# Patient Record
Sex: MALE | Race: WHITE | Employment: OTHER | URBAN - NONMETROPOLITAN AREA
[De-identification: names, ages, dates, MRNs, and addresses within clinical notes are randomized per-mention and may not be internally consistent; named-entity substitution may affect disease eponyms.]

---

## 2017-01-04 ENCOUNTER — ANTI-COAG VISIT (OUTPATIENT)
Dept: OTHER | Age: 77
End: 2017-01-04

## 2017-01-04 VITALS
HEART RATE: 96 BPM | WEIGHT: 299.8 LBS | DIASTOLIC BLOOD PRESSURE: 89 MMHG | BODY MASS INDEX: 44.92 KG/M2 | SYSTOLIC BLOOD PRESSURE: 143 MMHG

## 2017-01-04 DIAGNOSIS — I48.19 PERSISTENT ATRIAL FIBRILLATION (HCC): ICD-10-CM

## 2017-01-04 DIAGNOSIS — I26.99 OTHER PULMONARY EMBOLISM WITHOUT ACUTE COR PULMONALE, UNSPECIFIED CHRONICITY (HCC): ICD-10-CM

## 2017-01-04 LAB — POC INR: 2.2 (ref 0.8–1.2)

## 2017-01-04 PROCEDURE — 99999 PR OFFICE/OUTPT VISIT,PROCEDURE ONLY: CPT | Performed by: NURSE PRACTITIONER

## 2017-01-04 PROCEDURE — 85610 PROTHROMBIN TIME: CPT | Performed by: NURSE PRACTITIONER

## 2017-01-04 ASSESSMENT — ENCOUNTER SYMPTOMS
SHORTNESS OF BREATH: 0
DIARRHEA: 0
BLOOD IN STOOL: 0
CONSTIPATION: 0

## 2017-01-25 ENCOUNTER — ANTI-COAG VISIT (OUTPATIENT)
Dept: OTHER | Age: 77
End: 2017-01-25

## 2017-01-25 VITALS
BODY MASS INDEX: 44.5 KG/M2 | WEIGHT: 297 LBS | HEART RATE: 76 BPM | DIASTOLIC BLOOD PRESSURE: 92 MMHG | SYSTOLIC BLOOD PRESSURE: 142 MMHG

## 2017-01-25 DIAGNOSIS — I26.99 OTHER PULMONARY EMBOLISM WITHOUT ACUTE COR PULMONALE, UNSPECIFIED CHRONICITY (HCC): ICD-10-CM

## 2017-01-25 DIAGNOSIS — I48.19 PERSISTENT ATRIAL FIBRILLATION (HCC): ICD-10-CM

## 2017-01-25 LAB — POC INR: 2.3 (ref 0.8–1.2)

## 2017-01-25 PROCEDURE — G8427 DOCREV CUR MEDS BY ELIG CLIN: HCPCS | Performed by: NURSE PRACTITIONER

## 2017-01-25 PROCEDURE — 4040F PNEUMOC VAC/ADMIN/RCVD: CPT | Performed by: NURSE PRACTITIONER

## 2017-01-25 PROCEDURE — 85610 PROTHROMBIN TIME: CPT | Performed by: NURSE PRACTITIONER

## 2017-01-25 PROCEDURE — 3017F COLORECTAL CA SCREEN DOC REV: CPT | Performed by: NURSE PRACTITIONER

## 2017-01-25 PROCEDURE — 99999 PR OFFICE/OUTPT VISIT,PROCEDURE ONLY: CPT | Performed by: NURSE PRACTITIONER

## 2017-01-25 PROCEDURE — G8419 CALC BMI OUT NRM PARAM NOF/U: HCPCS | Performed by: NURSE PRACTITIONER

## 2017-01-25 ASSESSMENT — ENCOUNTER SYMPTOMS
BLOOD IN STOOL: 0
SHORTNESS OF BREATH: 0
DIARRHEA: 0
CONSTIPATION: 0

## 2017-02-09 RX ORDER — FLUTICASONE PROPIONATE 50 MCG
SPRAY, SUSPENSION (ML) NASAL
Qty: 48 G | Refills: 1 | Status: SHIPPED | OUTPATIENT
Start: 2017-02-09 | End: 2017-08-08 | Stop reason: SDUPTHER

## 2017-02-09 RX ORDER — RAMIPRIL 10 MG/1
CAPSULE ORAL
Qty: 90 CAPSULE | Refills: 1 | Status: SHIPPED | OUTPATIENT
Start: 2017-02-09 | End: 2017-06-27 | Stop reason: SDUPTHER

## 2017-02-09 RX ORDER — EZETIMIBE/SIMVASTATIN 10 MG-40MG
TABLET ORAL
Qty: 90 TABLET | Refills: 1 | Status: SHIPPED | OUTPATIENT
Start: 2017-02-09 | End: 2017-08-08 | Stop reason: SDUPTHER

## 2017-02-28 ENCOUNTER — ANTI-COAG VISIT (OUTPATIENT)
Dept: OTHER | Age: 77
End: 2017-02-28

## 2017-02-28 VITALS
DIASTOLIC BLOOD PRESSURE: 72 MMHG | SYSTOLIC BLOOD PRESSURE: 138 MMHG | HEART RATE: 76 BPM | WEIGHT: 303.8 LBS | BODY MASS INDEX: 45.52 KG/M2

## 2017-02-28 DIAGNOSIS — E78.5 HYPERLIPIDEMIA, UNSPECIFIED HYPERLIPIDEMIA TYPE: ICD-10-CM

## 2017-02-28 DIAGNOSIS — I48.19 PERSISTENT ATRIAL FIBRILLATION (HCC): ICD-10-CM

## 2017-02-28 DIAGNOSIS — E11.9 TYPE 2 DIABETES MELLITUS WITHOUT COMPLICATION, WITHOUT LONG-TERM CURRENT USE OF INSULIN (HCC): ICD-10-CM

## 2017-02-28 DIAGNOSIS — I10 ESSENTIAL HYPERTENSION: ICD-10-CM

## 2017-02-28 DIAGNOSIS — I26.99 OTHER PULMONARY EMBOLISM WITHOUT ACUTE COR PULMONALE, UNSPECIFIED CHRONICITY (HCC): ICD-10-CM

## 2017-02-28 LAB
ALT SERPL-CCNC: 14 U/L (ref 11–66)
ANION GAP SERPL CALCULATED.3IONS-SCNC: 15 MEQ/L (ref 8–16)
AVERAGE GLUCOSE: 135 MG/DL (ref 70–126)
BUN BLDV-MCNC: 30 MG/DL (ref 7–22)
CALCIUM SERPL-MCNC: 9 MG/DL (ref 8.5–10.5)
CHLORIDE BLD-SCNC: 107 MEQ/L (ref 98–111)
CHOLESTEROL, TOTAL: 114 MG/DL (ref 100–199)
CO2: 23 MEQ/L (ref 23–33)
CREAT SERPL-MCNC: 0.9 MG/DL (ref 0.4–1.2)
CREATININE, URINE: 112.5 MG/DL
GFR SERPL CREATININE-BSD FRML MDRD: 82 ML/MIN/1.73M2
GLUCOSE BLD-MCNC: 151 MG/DL (ref 70–108)
HBA1C MFR BLD: 6.5 % (ref 4.4–6.4)
HDLC SERPL-MCNC: 52 MG/DL
LDL CHOLESTEROL CALCULATED: 40 MG/DL
MICROALBUMIN UR-MCNC: 2.21 MG/DL
MICROALBUMIN/CREAT UR-RTO: 20 MG/G (ref 0–30)
POC INR: 2.3 (ref 0.8–1.2)
POTASSIUM SERPL-SCNC: 4.1 MEQ/L (ref 3.5–5.2)
SODIUM BLD-SCNC: 145 MEQ/L (ref 135–145)
TRIGL SERPL-MCNC: 112 MG/DL (ref 0–199)

## 2017-02-28 PROCEDURE — 85610 PROTHROMBIN TIME: CPT | Performed by: NURSE PRACTITIONER

## 2017-02-28 PROCEDURE — G8417 CALC BMI ABV UP PARAM F/U: HCPCS | Performed by: NURSE PRACTITIONER

## 2017-02-28 PROCEDURE — 1036F TOBACCO NON-USER: CPT | Performed by: NURSE PRACTITIONER

## 2017-02-28 PROCEDURE — 3017F COLORECTAL CA SCREEN DOC REV: CPT | Performed by: NURSE PRACTITIONER

## 2017-02-28 PROCEDURE — 4040F PNEUMOC VAC/ADMIN/RCVD: CPT | Performed by: NURSE PRACTITIONER

## 2017-02-28 PROCEDURE — G8484 FLU IMMUNIZE NO ADMIN: HCPCS | Performed by: NURSE PRACTITIONER

## 2017-02-28 PROCEDURE — 1123F ACP DISCUSS/DSCN MKR DOCD: CPT | Performed by: NURSE PRACTITIONER

## 2017-02-28 PROCEDURE — 36415 COLL VENOUS BLD VENIPUNCTURE: CPT | Performed by: INTERNAL MEDICINE

## 2017-02-28 PROCEDURE — G8427 DOCREV CUR MEDS BY ELIG CLIN: HCPCS | Performed by: NURSE PRACTITIONER

## 2017-02-28 PROCEDURE — 99212 OFFICE O/P EST SF 10 MIN: CPT | Performed by: NURSE PRACTITIONER

## 2017-02-28 ASSESSMENT — ENCOUNTER SYMPTOMS
SHORTNESS OF BREATH: 0
DIARRHEA: 0
BLOOD IN STOOL: 0
CONSTIPATION: 0

## 2017-03-06 RX ORDER — BUMETANIDE 1 MG/1
TABLET ORAL
Qty: 90 TABLET | Refills: 1 | Status: SHIPPED | OUTPATIENT
Start: 2017-03-06 | End: 2017-09-02 | Stop reason: SDUPTHER

## 2017-03-21 ENCOUNTER — OFFICE VISIT (OUTPATIENT)
Dept: INTERNAL MEDICINE | Age: 77
End: 2017-03-21

## 2017-03-21 VITALS
OXYGEN SATURATION: 97 % | HEIGHT: 68 IN | BODY MASS INDEX: 44.79 KG/M2 | HEART RATE: 70 BPM | SYSTOLIC BLOOD PRESSURE: 128 MMHG | DIASTOLIC BLOOD PRESSURE: 82 MMHG | WEIGHT: 295.5 LBS

## 2017-03-21 DIAGNOSIS — I48.19 PERSISTENT ATRIAL FIBRILLATION (HCC): ICD-10-CM

## 2017-03-21 DIAGNOSIS — R60.0 BILATERAL EDEMA OF LOWER EXTREMITY: ICD-10-CM

## 2017-03-21 DIAGNOSIS — E66.01 MORBID OBESITY WITH BMI OF 40.0-44.9, ADULT (HCC): ICD-10-CM

## 2017-03-21 DIAGNOSIS — I10 ESSENTIAL HYPERTENSION: Primary | ICD-10-CM

## 2017-03-21 DIAGNOSIS — E66.01 MORBID OBESITY DUE TO EXCESS CALORIES (HCC): ICD-10-CM

## 2017-03-21 DIAGNOSIS — E11.9 TYPE 2 DIABETES MELLITUS WITHOUT COMPLICATION, WITHOUT LONG-TERM CURRENT USE OF INSULIN (HCC): ICD-10-CM

## 2017-03-21 DIAGNOSIS — Z79.01 ANTICOAGULATED ON COUMADIN: ICD-10-CM

## 2017-03-21 DIAGNOSIS — Z23 NEED FOR VACCINATION FOR DTP: ICD-10-CM

## 2017-03-21 DIAGNOSIS — E78.00 PURE HYPERCHOLESTEROLEMIA: ICD-10-CM

## 2017-03-21 PROCEDURE — 3017F COLORECTAL CA SCREEN DOC REV: CPT | Performed by: INTERNAL MEDICINE

## 2017-03-21 PROCEDURE — 90715 TDAP VACCINE 7 YRS/> IM: CPT | Performed by: INTERNAL MEDICINE

## 2017-03-21 PROCEDURE — 1123F ACP DISCUSS/DSCN MKR DOCD: CPT | Performed by: INTERNAL MEDICINE

## 2017-03-21 PROCEDURE — G8427 DOCREV CUR MEDS BY ELIG CLIN: HCPCS | Performed by: INTERNAL MEDICINE

## 2017-03-21 PROCEDURE — 90471 IMMUNIZATION ADMIN: CPT | Performed by: INTERNAL MEDICINE

## 2017-03-21 PROCEDURE — 4040F PNEUMOC VAC/ADMIN/RCVD: CPT | Performed by: INTERNAL MEDICINE

## 2017-03-21 PROCEDURE — G8417 CALC BMI ABV UP PARAM F/U: HCPCS | Performed by: INTERNAL MEDICINE

## 2017-03-21 PROCEDURE — G8484 FLU IMMUNIZE NO ADMIN: HCPCS | Performed by: INTERNAL MEDICINE

## 2017-03-21 PROCEDURE — 1036F TOBACCO NON-USER: CPT | Performed by: INTERNAL MEDICINE

## 2017-03-21 PROCEDURE — 99214 OFFICE O/P EST MOD 30 MIN: CPT | Performed by: INTERNAL MEDICINE

## 2017-03-21 PROCEDURE — 3044F HG A1C LEVEL LT 7.0%: CPT | Performed by: INTERNAL MEDICINE

## 2017-03-21 RX ORDER — METOPROLOL TARTRATE 50 MG/1
50 TABLET, FILM COATED ORAL 2 TIMES DAILY
Qty: 180 TABLET | Refills: 1 | Status: SHIPPED | OUTPATIENT
Start: 2017-03-21 | End: 2017-09-19 | Stop reason: SDUPTHER

## 2017-03-30 DIAGNOSIS — I48.19 PERSISTENT ATRIAL FIBRILLATION (HCC): Primary | ICD-10-CM

## 2017-04-04 ENCOUNTER — ANTI-COAG VISIT (OUTPATIENT)
Dept: OTHER | Age: 77
End: 2017-04-04

## 2017-04-04 VITALS — SYSTOLIC BLOOD PRESSURE: 142 MMHG | HEART RATE: 94 BPM | DIASTOLIC BLOOD PRESSURE: 82 MMHG

## 2017-04-04 DIAGNOSIS — I26.99 OTHER PULMONARY EMBOLISM WITHOUT ACUTE COR PULMONALE, UNSPECIFIED CHRONICITY (HCC): ICD-10-CM

## 2017-04-04 DIAGNOSIS — I48.19 PERSISTENT ATRIAL FIBRILLATION (HCC): ICD-10-CM

## 2017-04-04 LAB — POC INR: 2.6 (ref 0.8–1.2)

## 2017-04-04 ASSESSMENT — ENCOUNTER SYMPTOMS
DIARRHEA: 0
BLOOD IN STOOL: 0
CONSTIPATION: 0

## 2017-05-16 ENCOUNTER — ANTI-COAG VISIT (OUTPATIENT)
Dept: OTHER | Age: 77
End: 2017-05-16

## 2017-05-16 VITALS
WEIGHT: 299.8 LBS | BODY MASS INDEX: 45.58 KG/M2 | DIASTOLIC BLOOD PRESSURE: 72 MMHG | SYSTOLIC BLOOD PRESSURE: 139 MMHG | HEART RATE: 72 BPM

## 2017-05-16 DIAGNOSIS — I26.99 OTHER PULMONARY EMBOLISM WITHOUT ACUTE COR PULMONALE, UNSPECIFIED CHRONICITY (HCC): ICD-10-CM

## 2017-05-16 DIAGNOSIS — I48.19 PERSISTENT ATRIAL FIBRILLATION (HCC): ICD-10-CM

## 2017-05-16 LAB — POC INR: 2.7 (ref 0.8–1.2)

## 2017-05-16 ASSESSMENT — ENCOUNTER SYMPTOMS
DIARRHEA: 0
SHORTNESS OF BREATH: 0
CONSTIPATION: 0
BLOOD IN STOOL: 0

## 2017-05-27 DIAGNOSIS — N40.0 BENIGN NON-NODULAR PROSTATIC HYPERPLASIA WITHOUT LOWER URINARY TRACT SYMPTOMS: ICD-10-CM

## 2017-05-31 RX ORDER — DUTASTERIDE 0.5 MG/1
CAPSULE, LIQUID FILLED ORAL
Qty: 90 CAPSULE | Refills: 1 | Status: SHIPPED | OUTPATIENT
Start: 2017-05-31 | End: 2017-11-27 | Stop reason: SDUPTHER

## 2017-06-15 RX ORDER — PAROXETINE HYDROCHLORIDE 20 MG/1
TABLET, FILM COATED ORAL
Qty: 90 TABLET | Refills: 1 | Status: SHIPPED | OUTPATIENT
Start: 2017-06-15 | End: 2017-12-12 | Stop reason: SDUPTHER

## 2017-06-27 ENCOUNTER — OFFICE VISIT (OUTPATIENT)
Dept: INTERNAL MEDICINE | Age: 77
End: 2017-06-27

## 2017-06-27 VITALS
HEIGHT: 68 IN | HEART RATE: 76 BPM | BODY MASS INDEX: 44.41 KG/M2 | WEIGHT: 293 LBS | SYSTOLIC BLOOD PRESSURE: 118 MMHG | DIASTOLIC BLOOD PRESSURE: 78 MMHG

## 2017-06-27 DIAGNOSIS — E78.00 PURE HYPERCHOLESTEROLEMIA: ICD-10-CM

## 2017-06-27 DIAGNOSIS — E66.01 MORBID OBESITY DUE TO EXCESS CALORIES (HCC): Primary | ICD-10-CM

## 2017-06-27 DIAGNOSIS — I10 ESSENTIAL HYPERTENSION: ICD-10-CM

## 2017-06-27 DIAGNOSIS — E11.9 TYPE 2 DIABETES MELLITUS WITHOUT COMPLICATION, WITHOUT LONG-TERM CURRENT USE OF INSULIN (HCC): ICD-10-CM

## 2017-06-27 PROCEDURE — 1036F TOBACCO NON-USER: CPT | Performed by: INTERNAL MEDICINE

## 2017-06-27 PROCEDURE — 3017F COLORECTAL CA SCREEN DOC REV: CPT | Performed by: INTERNAL MEDICINE

## 2017-06-27 PROCEDURE — 3046F HEMOGLOBIN A1C LEVEL >9.0%: CPT | Performed by: INTERNAL MEDICINE

## 2017-06-27 PROCEDURE — 4040F PNEUMOC VAC/ADMIN/RCVD: CPT | Performed by: INTERNAL MEDICINE

## 2017-06-27 PROCEDURE — 1123F ACP DISCUSS/DSCN MKR DOCD: CPT | Performed by: INTERNAL MEDICINE

## 2017-06-27 PROCEDURE — G8417 CALC BMI ABV UP PARAM F/U: HCPCS | Performed by: INTERNAL MEDICINE

## 2017-06-27 PROCEDURE — G8427 DOCREV CUR MEDS BY ELIG CLIN: HCPCS | Performed by: INTERNAL MEDICINE

## 2017-06-27 PROCEDURE — 99213 OFFICE O/P EST LOW 20 MIN: CPT | Performed by: INTERNAL MEDICINE

## 2017-06-27 RX ORDER — RAMIPRIL 10 MG/1
CAPSULE ORAL
Qty: 90 CAPSULE | Refills: 1 | Status: SHIPPED | OUTPATIENT
Start: 2017-06-27 | End: 2018-01-05 | Stop reason: SDUPTHER

## 2017-06-27 ASSESSMENT — PATIENT HEALTH QUESTIONNAIRE - PHQ9
1. LITTLE INTEREST OR PLEASURE IN DOING THINGS: 0
2. FEELING DOWN, DEPRESSED OR HOPELESS: 0
SUM OF ALL RESPONSES TO PHQ9 QUESTIONS 1 & 2: 0
SUM OF ALL RESPONSES TO PHQ QUESTIONS 1-9: 0

## 2017-06-29 ENCOUNTER — ANTI-COAG VISIT (OUTPATIENT)
Dept: OTHER | Age: 77
End: 2017-06-29

## 2017-06-29 VITALS
HEART RATE: 78 BPM | DIASTOLIC BLOOD PRESSURE: 69 MMHG | WEIGHT: 296 LBS | SYSTOLIC BLOOD PRESSURE: 111 MMHG | BODY MASS INDEX: 44.86 KG/M2

## 2017-06-29 DIAGNOSIS — I48.19 PERSISTENT ATRIAL FIBRILLATION (HCC): ICD-10-CM

## 2017-06-29 DIAGNOSIS — I26.99 OTHER PULMONARY EMBOLISM WITHOUT ACUTE COR PULMONALE, UNSPECIFIED CHRONICITY (HCC): ICD-10-CM

## 2017-06-29 LAB — POC INR: 3.4 (ref 0.8–1.2)

## 2017-06-29 ASSESSMENT — ENCOUNTER SYMPTOMS
DIARRHEA: 0
SHORTNESS OF BREATH: 0
BLOOD IN STOOL: 0
CONSTIPATION: 0

## 2017-07-20 ENCOUNTER — HOSPITAL ENCOUNTER (OUTPATIENT)
Dept: PHARMACY | Age: 77
Setting detail: THERAPIES SERIES
Discharge: HOME | End: 2017-07-20
Payer: MEDICARE

## 2017-07-20 VITALS
WEIGHT: 293.6 LBS | BODY MASS INDEX: 44.5 KG/M2 | DIASTOLIC BLOOD PRESSURE: 69 MMHG | SYSTOLIC BLOOD PRESSURE: 133 MMHG | HEART RATE: 62 BPM

## 2017-07-20 DIAGNOSIS — I48.19 PERSISTENT ATRIAL FIBRILLATION (HCC): ICD-10-CM

## 2017-07-20 DIAGNOSIS — I26.99 OTHER PULMONARY EMBOLISM WITHOUT ACUTE COR PULMONALE, UNSPECIFIED CHRONICITY (HCC): ICD-10-CM

## 2017-07-20 LAB — POC INR: 2.5 (ref 0.8–1.2)

## 2017-07-20 PROCEDURE — 99212 OFFICE O/P EST SF 10 MIN: CPT

## 2017-07-20 PROCEDURE — 36416 COLLJ CAPILLARY BLOOD SPEC: CPT

## 2017-07-20 PROCEDURE — 85610 PROTHROMBIN TIME: CPT

## 2017-07-20 RX ORDER — WARFARIN SODIUM 5 MG/1
TABLET ORAL
Qty: 130 TABLET | Refills: 3 | Status: SHIPPED | OUTPATIENT
Start: 2017-07-20 | End: 2019-06-19 | Stop reason: SDUPTHER

## 2017-07-20 RX ORDER — WARFARIN SODIUM 5 MG/1
TABLET ORAL EVERY EVENING
COMMUNITY
End: 2017-09-05

## 2017-07-20 ASSESSMENT — ENCOUNTER SYMPTOMS
CONSTIPATION: 0
BLOOD IN STOOL: 0
SHORTNESS OF BREATH: 0
DIARRHEA: 0

## 2017-08-08 RX ORDER — FLUTICASONE PROPIONATE 50 MCG
SPRAY, SUSPENSION (ML) NASAL
Qty: 48 G | Refills: 1 | Status: SHIPPED | OUTPATIENT
Start: 2017-08-08 | End: 2018-06-28 | Stop reason: SDUPTHER

## 2017-08-08 RX ORDER — EZETIMIBE AND SIMVASTATIN 10; 40 MG/1; MG/1
TABLET ORAL
Qty: 90 TABLET | Refills: 1 | Status: SHIPPED | OUTPATIENT
Start: 2017-08-08 | End: 2018-02-04 | Stop reason: SDUPTHER

## 2017-08-14 ENCOUNTER — TELEPHONE (OUTPATIENT)
Dept: INTERNAL MEDICINE CLINIC | Age: 77
End: 2017-08-14

## 2017-08-14 ENCOUNTER — TELEPHONE (OUTPATIENT)
Dept: PHARMACY | Age: 77
End: 2017-08-14

## 2017-08-17 ENCOUNTER — TELEPHONE (OUTPATIENT)
Dept: PHARMACY | Age: 77
End: 2017-08-17

## 2017-08-22 ENCOUNTER — TELEPHONE (OUTPATIENT)
Dept: PHARMACY | Age: 77
End: 2017-08-22

## 2017-09-05 ENCOUNTER — HOSPITAL ENCOUNTER (OUTPATIENT)
Dept: PHARMACY | Age: 77
Setting detail: THERAPIES SERIES
Discharge: HOME | End: 2017-09-05
Payer: MEDICARE

## 2017-09-05 VITALS
BODY MASS INDEX: 43.95 KG/M2 | DIASTOLIC BLOOD PRESSURE: 79 MMHG | WEIGHT: 290 LBS | HEART RATE: 78 BPM | SYSTOLIC BLOOD PRESSURE: 139 MMHG

## 2017-09-05 DIAGNOSIS — I48.19 PERSISTENT ATRIAL FIBRILLATION (HCC): ICD-10-CM

## 2017-09-05 DIAGNOSIS — I26.99 OTHER PULMONARY EMBOLISM WITHOUT ACUTE COR PULMONALE, UNSPECIFIED CHRONICITY (HCC): ICD-10-CM

## 2017-09-05 LAB
INR BLD: 1.7
POC INR: 1.7 (ref 0.8–1.2)

## 2017-09-05 PROCEDURE — 85610 PROTHROMBIN TIME: CPT

## 2017-09-05 PROCEDURE — 99211 OFF/OP EST MAY X REQ PHY/QHP: CPT

## 2017-09-05 PROCEDURE — 36416 COLLJ CAPILLARY BLOOD SPEC: CPT

## 2017-09-05 RX ORDER — BUMETANIDE 1 MG/1
TABLET ORAL
Qty: 90 TABLET | Refills: 1 | Status: SHIPPED | OUTPATIENT
Start: 2017-09-05 | End: 2018-03-04 | Stop reason: SDUPTHER

## 2017-09-05 ASSESSMENT — ENCOUNTER SYMPTOMS
DIARRHEA: 0
SHORTNESS OF BREATH: 0
CONSTIPATION: 0
BLOOD IN STOOL: 0

## 2017-09-19 ENCOUNTER — HOSPITAL ENCOUNTER (OUTPATIENT)
Dept: PHARMACY | Age: 77
Setting detail: THERAPIES SERIES
Discharge: HOME | End: 2017-09-19
Payer: MEDICARE

## 2017-09-19 VITALS
SYSTOLIC BLOOD PRESSURE: 139 MMHG | BODY MASS INDEX: 43.41 KG/M2 | WEIGHT: 286.4 LBS | HEART RATE: 80 BPM | DIASTOLIC BLOOD PRESSURE: 77 MMHG

## 2017-09-19 DIAGNOSIS — I48.19 PERSISTENT ATRIAL FIBRILLATION (HCC): ICD-10-CM

## 2017-09-19 DIAGNOSIS — E11.9 TYPE 2 DIABETES MELLITUS WITHOUT COMPLICATION, WITHOUT LONG-TERM CURRENT USE OF INSULIN (HCC): ICD-10-CM

## 2017-09-19 DIAGNOSIS — I26.99 OTHER PULMONARY EMBOLISM WITHOUT ACUTE COR PULMONALE, UNSPECIFIED CHRONICITY (HCC): ICD-10-CM

## 2017-09-19 DIAGNOSIS — I10 ESSENTIAL HYPERTENSION: ICD-10-CM

## 2017-09-19 LAB — POC INR: 2.7 (ref 0.8–1.2)

## 2017-09-19 PROCEDURE — 99211 OFF/OP EST MAY X REQ PHY/QHP: CPT

## 2017-09-19 PROCEDURE — 85610 PROTHROMBIN TIME: CPT

## 2017-09-19 PROCEDURE — 36416 COLLJ CAPILLARY BLOOD SPEC: CPT

## 2017-09-19 RX ORDER — METOPROLOL TARTRATE 50 MG/1
TABLET, FILM COATED ORAL
Qty: 180 TABLET | Refills: 1 | Status: SHIPPED | OUTPATIENT
Start: 2017-09-19 | End: 2018-02-20

## 2017-09-19 ASSESSMENT — ENCOUNTER SYMPTOMS
SHORTNESS OF BREATH: 0
CONSTIPATION: 0
DIARRHEA: 0
BLOOD IN STOOL: 0

## 2017-10-12 ENCOUNTER — HOSPITAL ENCOUNTER (OUTPATIENT)
Dept: PHARMACY | Age: 77
Setting detail: THERAPIES SERIES
Discharge: HOME | End: 2017-10-12
Payer: MEDICARE

## 2017-10-12 VITALS
DIASTOLIC BLOOD PRESSURE: 72 MMHG | HEART RATE: 73 BPM | BODY MASS INDEX: 44.22 KG/M2 | SYSTOLIC BLOOD PRESSURE: 132 MMHG | WEIGHT: 291.8 LBS

## 2017-10-12 DIAGNOSIS — I26.99 OTHER PULMONARY EMBOLISM WITHOUT ACUTE COR PULMONALE, UNSPECIFIED CHRONICITY (HCC): ICD-10-CM

## 2017-10-12 DIAGNOSIS — I48.19 PERSISTENT ATRIAL FIBRILLATION (HCC): ICD-10-CM

## 2017-10-12 LAB — POC INR: 2.9 (ref 0.8–1.2)

## 2017-10-12 PROCEDURE — 99211 OFF/OP EST MAY X REQ PHY/QHP: CPT

## 2017-10-12 PROCEDURE — 85610 PROTHROMBIN TIME: CPT

## 2017-10-12 PROCEDURE — 36416 COLLJ CAPILLARY BLOOD SPEC: CPT

## 2017-10-12 ASSESSMENT — ENCOUNTER SYMPTOMS
CONSTIPATION: 0
BLOOD IN STOOL: 0
SHORTNESS OF BREATH: 0
DIARRHEA: 0

## 2017-10-12 NOTE — PROGRESS NOTES
The Medication Management [facility]  Anticoagulation Clinic  [phone] (phone)           [phone] (fax)    Visit Date: 10/12/2017     Subjective:       Patient ID: [place], 78 y.o., male    HPI  Patient seen in clinic for anticoagulation management for diagnoses of persistent atrial fib, PE with a goal INR of 2.0-3.0. Last seen 3 weeks ago. States correct coumadin dose and tablet strength. Denies missed doses. Review of Systems   Constitutional: Negative for activity change, appetite change and fatigue. HENT: Negative for nosebleeds. Respiratory: Negative for shortness of breath. Cardiovascular: Negative for chest pain and leg swelling. Gastrointestinal: Negative for blood in stool, constipation and diarrhea. Genitourinary: Negative for hematuria. Neurological: Negative for dizziness, weakness, light-headedness and headaches. Hematological: Does not bruise/bleed easily.        Objective:   Physical Exam   Vitals:    10/12/17 1411   BP: 132/72   Pulse: 73       Physical Exam   Constitutional: He is oriented to person, place, and time. He appears well-developed and well-nourished. Cardiovascular: Normal rate. Pulmonary/Chest: Effort normal.   Neurological: He is alert and oriented to person, place, and time. Psychiatric: He has a normal mood and affect. His behavior is normal.       Assessment:     Lab Results   Component Value Date    INR 2.90 (H) 10/12/2017    INR 2.70 (H) 09/19/2017    INR 1.70 (H) 09/05/2017    PROTIME 21.7 (A) 08/14/2017    PROTIME 52.5 (H) 08/12/2017    PROTIME 26.8 (H) 05/30/2015     INR therapeutic   Recent Labs      10/12/17   1410   INR  2.90*                           Plan:   POCT INR ordered and result reviewed. Continue Coumadin 5 mg po MF, 7.5 mg po TWTHSS. Recheck INR 4 weeks. Patient reminded to call the Anticoagulation Clinic with any signs or symptoms of bleeding or with any medication changes.   Patient given instructions utilizing the teach back method. Discharged ambulatory in no apparent distress.

## 2017-10-13 ENCOUNTER — TELEPHONE (OUTPATIENT)
Dept: INTERNAL MEDICINE CLINIC | Age: 77
End: 2017-10-13

## 2017-10-13 DIAGNOSIS — E11.9 TYPE 2 DIABETES MELLITUS WITHOUT COMPLICATION, WITHOUT LONG-TERM CURRENT USE OF INSULIN (HCC): ICD-10-CM

## 2017-10-13 DIAGNOSIS — E78.00 PURE HYPERCHOLESTEROLEMIA: ICD-10-CM

## 2017-10-13 LAB
ALT SERPL-CCNC: 12 U/L (ref 11–66)
ANION GAP SERPL CALCULATED.3IONS-SCNC: 16 MEQ/L (ref 8–16)
AVERAGE GLUCOSE: 132 MG/DL (ref 70–126)
BUN BLDV-MCNC: 24 MG/DL (ref 7–22)
CALCIUM SERPL-MCNC: 8.7 MG/DL (ref 8.5–10.5)
CHLORIDE BLD-SCNC: 104 MEQ/L (ref 98–111)
CO2: 26 MEQ/L (ref 23–33)
CREAT SERPL-MCNC: 0.8 MG/DL (ref 0.4–1.2)
GFR SERPL CREATININE-BSD FRML MDRD: > 90 ML/MIN/1.73M2
GLUCOSE BLD-MCNC: 159 MG/DL (ref 70–108)
HBA1C MFR BLD: 6.4 % (ref 4.4–6.4)
POTASSIUM SERPL-SCNC: 4.1 MEQ/L (ref 3.5–5.2)
SODIUM BLD-SCNC: 146 MEQ/L (ref 135–145)

## 2017-10-13 NOTE — TELEPHONE ENCOUNTER
Informed of lab results and that Dr. Adarsh Turner states he looks \"dry\" and needs to drink more water and to limit his caffeine.

## 2017-10-13 NOTE — TELEPHONE ENCOUNTER
----- Message from Selina Jurado MD sent at 10/13/2017 11:20 AM EDT -----  Let him know that labs look dry - He needs to drink more water and limit caffeine.

## 2017-10-13 NOTE — PROGRESS NOTES
Venipuncture obtained from right arm. Patient tolerated the procedure without complications or complaints.

## 2017-10-31 ENCOUNTER — OFFICE VISIT (OUTPATIENT)
Dept: INTERNAL MEDICINE CLINIC | Age: 77
End: 2017-10-31
Payer: MEDICARE

## 2017-10-31 VITALS
BODY MASS INDEX: 44.56 KG/M2 | DIASTOLIC BLOOD PRESSURE: 78 MMHG | WEIGHT: 294 LBS | SYSTOLIC BLOOD PRESSURE: 132 MMHG | HEIGHT: 68 IN | HEART RATE: 70 BPM

## 2017-10-31 DIAGNOSIS — Z23 NEED FOR IMMUNIZATION AGAINST INFLUENZA: ICD-10-CM

## 2017-10-31 DIAGNOSIS — E78.00 PURE HYPERCHOLESTEROLEMIA: ICD-10-CM

## 2017-10-31 DIAGNOSIS — Z23 NEED FOR PROPHYLACTIC VACCINATION AGAINST STREPTOCOCCUS PNEUMONIAE (PNEUMOCOCCUS): ICD-10-CM

## 2017-10-31 DIAGNOSIS — Z79.01 ANTICOAGULATED ON COUMADIN: ICD-10-CM

## 2017-10-31 DIAGNOSIS — E66.01 MORBID OBESITY (HCC): ICD-10-CM

## 2017-10-31 DIAGNOSIS — I48.19 PERSISTENT ATRIAL FIBRILLATION (HCC): ICD-10-CM

## 2017-10-31 DIAGNOSIS — I10 ESSENTIAL HYPERTENSION: Primary | ICD-10-CM

## 2017-10-31 DIAGNOSIS — E87.0 HYPERNATREMIA: ICD-10-CM

## 2017-10-31 DIAGNOSIS — E11.9 TYPE 2 DIABETES MELLITUS WITHOUT COMPLICATION, WITHOUT LONG-TERM CURRENT USE OF INSULIN (HCC): ICD-10-CM

## 2017-10-31 DIAGNOSIS — F33.41 MAJOR DEPRESSIVE DISORDER, RECURRENT, IN PARTIAL REMISSION (HCC): ICD-10-CM

## 2017-10-31 PROCEDURE — G8484 FLU IMMUNIZE NO ADMIN: HCPCS | Performed by: INTERNAL MEDICINE

## 2017-10-31 PROCEDURE — 4040F PNEUMOC VAC/ADMIN/RCVD: CPT | Performed by: INTERNAL MEDICINE

## 2017-10-31 PROCEDURE — G0008 ADMIN INFLUENZA VIRUS VAC: HCPCS | Performed by: INTERNAL MEDICINE

## 2017-10-31 PROCEDURE — G8427 DOCREV CUR MEDS BY ELIG CLIN: HCPCS | Performed by: INTERNAL MEDICINE

## 2017-10-31 PROCEDURE — 90732 PPSV23 VACC 2 YRS+ SUBQ/IM: CPT | Performed by: INTERNAL MEDICINE

## 2017-10-31 PROCEDURE — 3017F COLORECTAL CA SCREEN DOC REV: CPT | Performed by: INTERNAL MEDICINE

## 2017-10-31 PROCEDURE — 1123F ACP DISCUSS/DSCN MKR DOCD: CPT | Performed by: INTERNAL MEDICINE

## 2017-10-31 PROCEDURE — G8417 CALC BMI ABV UP PARAM F/U: HCPCS | Performed by: INTERNAL MEDICINE

## 2017-10-31 PROCEDURE — 1036F TOBACCO NON-USER: CPT | Performed by: INTERNAL MEDICINE

## 2017-10-31 PROCEDURE — 90662 IIV NO PRSV INCREASED AG IM: CPT | Performed by: INTERNAL MEDICINE

## 2017-10-31 PROCEDURE — 93000 ELECTROCARDIOGRAM COMPLETE: CPT | Performed by: INTERNAL MEDICINE

## 2017-10-31 PROCEDURE — G0009 ADMIN PNEUMOCOCCAL VACCINE: HCPCS | Performed by: INTERNAL MEDICINE

## 2017-10-31 PROCEDURE — 3044F HG A1C LEVEL LT 7.0%: CPT | Performed by: INTERNAL MEDICINE

## 2017-10-31 PROCEDURE — 99214 OFFICE O/P EST MOD 30 MIN: CPT | Performed by: INTERNAL MEDICINE

## 2017-10-31 NOTE — PROGRESS NOTES
After obtaining consent, and per orders of Dr. Janay Berrios, injection of Fluzone HD given in Right deltoid by [facility]. Patient instructed to remain in clinic for 20 minutes afterwards, and to report any adverse reaction to me immediately.

## 2017-10-31 NOTE — PROGRESS NOTES
Chief Complaint   Patient presents with    Diabetes    Hyperlipidemia    Hypertension    Obesity       This patient presents for medical evaluation. I last saw the patient 4 months ago. This patient is followed by Dr. Stephanie Allen. He had recent issues with epistaxis - 2 major bleeds in 5 days - went to ER - [facility] ENT cauderize right nare and is doing well. He stopped Coumadin and Aspirin x 2 weeks. Repeat INR was 1.7 and now 2.9. Morbid obesity - BMI 44.66 -> 43.15->44.93 ->44.41 -> 44.56. He is watching what he eats - he states he doesn't want to be on more medication for diabetes. Today she states he is being good with keeping sweets in moderation. Again encouraged him to be more active - walk around the store with wife instead of sitting while she shops. Joint pain is still a major issue. Offered to send to ortho for steroid injections but he refuses. Will try to lose weight - work on portion size. Wife states that now that she can't see - he is trying to get more junk food in grocery. But she is trying to encourage him to eat better. Wife lost her sight. She is getting eye injections to hopefully help. She may transfer to me - Dr. Stephanie Allen retired. He was diagnosed with DM in 2012 but just used diet control until 2/2016 when HgA1c 7.8 - started metformin 500 mg BID - main complaint is gas. Offered metformin XR but wanted to stay with current pill. Last eye exam 12/8/16 Dr. Suhail Wasserman - no diabetic retinopathy -note on chart. He denies foot lesions - exam 10/31/17. On ASA, Vytorin, and Ramipril. Denies autonomic dysfunction, neuropathy, no nephropathy 2/2017. BMP normal 10/2017. Repeat HgA1c 6.9 6/2016 after starting metformin, no GI issues on the metformin now. HgA1c 6.4 10/2017. Hypernatremia - 146 10/2017, not drinking enough water - better now with water intake.       HM - he got a reminder from [facility] to do colonoscopy (7 year followup - history of polyps and none on last one). Encouraged him to get this scheduled. Leg edema - He backed off diuretic to every 2-3 days and edema stable. Previously taking 2 days in a row at times and may skip 2 days. Now on diuretic daily except Sunday and edema is stable. Hypertension - BP normal today - On Ramipril, and Metoprolol - unable to start Norvasc due to dose of Simvastatin. No headache or visual changes. Hyperlipidemia - LDL 40 2/2017 - at goal, normal ALT 10/2017. No new muscle aches on statin. Continue Vytorin. GERD - He uses Zantac Qpm - may increase to twice a day. He states he didn't tolerate the PPI in the past.  Zantac not as effective, increased gas in am.  He wants to try Nexium OTC - suggest it was a good idea. If not helpful try Gas-x. He didn't try Gas-X or Nexium since last visit. Reminded him of these options. Today he states he didn't start the Nexium, and felling better with weight loss - only taking Zantac occasionally. BPH - He states he wants me to fill Avodart due to sees me more frequently than Dr. Kel Kohler. He had gross hematuria several months ago - Dr. Kel Kohler switched finesteride to Avodart and discussed considering IVC filter and stopping Coumadin if Avodart didn't help. We are using Coumadin for A. Fib really now, not the PE. He was diagnosed with A. Fib at time of PE, they wanted to do cardioversion at [address] [place] [address] - but he refused till he talked to me first.  He was sent to Dr. Jaky Singer for follow up. I will be managing his Coumadin with [facility] coumadin clinic. Dr. Jaky Singer decided not to do CV (due to his weight) per patient's report. He states he is no longer seeing Dr. Jaky Singer. I will continue his Metoprolol and Coumadin. Discussed Eliquis in past, but he wanted to stick with Coumadin. He is still in Atrial fibrillation with a CHADS score of 4 (hypertension, DM and history of PE) - so I would favor continuing Coumadin as long as he is no longer bleeding.   No issues since last visit with bleeding or CVA symptoms. He had issues with hematuria/passing blood clots. Seen in [facility] ER 5/30/15 - no UTI by culture but large amount of blood, INR at the time was 2.4 with a normal Hg, kidney function. Saw Dr. Clarence Sweet and work-up continuing. Cystoscopy without bladder lesion. CT done 6/18/15 with non-obstructing stones and possible bladder diverticulum, to see Dr. Clarence Sweet Tuesday to discuss. He was told hematuria was due to BPH and no hematuria since starting Proscar. He stopped Coumadin for 8 days till saw Dr. Clarence Sweet, then held a couple days. He states he has been back on Coumadin a week and no hematuria. Continues to be resolved. Saw Dr. Clarence Sweet 9/2015 with good report. 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 [facility] sent him by helicopter to [facility]. He had directed thrombolytics given. He states that the venous doppler of bilateral legs were negative. He was placed on Coumadin and sent to [address] [place] [address] - I will manage with them. INR is therapeutic. He has noticed right leg edema - stable and slowly getting better. He is sleeping in recliner. Suggested he needs to elevate above the level of his heart and wear compression hose at all times - may take off at night if legs are elevated. He states edema has improved since last visit. He was no longer wearing compression hose. Suggested getting back into it to prevent edema from coming back. OA - Recommend no Ibuprofen now that he is on Coumadin. He is using Tylenol prn instead.     Past Medical History:   Diagnosis Date    Allergic rhinitis     Atrial fibrillation ([address] 2/2014    Depression     Diabetes mellitus type II 1/2012    diagnosed with HgA1c 6.6    Erectile dysfunction     Hyperlipidemia     Hypertension     Lower extremity edema     LV dysfunction     h/o, normal now    Morbid obesity ([address]     Osteoarthritis     left knee, right foot, back    PE (pulmonary embolism) 2/2014 more.    Atrial fib - on Coumadin, CHADS 4. No bleeding or signs of CVA. HM - give flu and pneumovax 23 today. Depression - stable but will occasionally complain of depression - continue Paxil. Will schedule follow up appointment in 4 months follow-up chronic issues. Continue medications at current doses. Erick Jean Baptiste MD    [facility] PROFESSIONAL SERVS  PHYSICIANS [facility]  [address]  [address]  Dept: [phone]  Dept Fax: [phone] : [phone]

## 2017-10-31 NOTE — PROGRESS NOTES
After obtaining consent, and per orders of Dr. Sarah Babb, injection of Pneumovax 23 given in Left deltoid by [facility] . Patient instructed to remain in clinic for 20 minutes afterwards, and to report any adverse reaction to me immediately.

## 2017-11-09 ENCOUNTER — HOSPITAL ENCOUNTER (OUTPATIENT)
Dept: PHARMACY | Age: 77
Setting detail: THERAPIES SERIES
Discharge: HOME | End: 2017-11-09
Payer: MEDICARE

## 2017-11-09 VITALS
DIASTOLIC BLOOD PRESSURE: 73 MMHG | SYSTOLIC BLOOD PRESSURE: 144 MMHG | BODY MASS INDEX: 44.68 KG/M2 | WEIGHT: 294.8 LBS | HEART RATE: 101 BPM

## 2017-11-09 DIAGNOSIS — I26.99 OTHER PULMONARY EMBOLISM WITHOUT ACUTE COR PULMONALE, UNSPECIFIED CHRONICITY (HCC): ICD-10-CM

## 2017-11-09 DIAGNOSIS — I48.19 PERSISTENT ATRIAL FIBRILLATION (HCC): ICD-10-CM

## 2017-11-09 LAB — POC INR: 3.4 (ref 0.8–1.2)

## 2017-11-09 PROCEDURE — 36416 COLLJ CAPILLARY BLOOD SPEC: CPT | Performed by: PHARMACIST

## 2017-11-09 PROCEDURE — 85610 PROTHROMBIN TIME: CPT | Performed by: PHARMACIST

## 2017-11-09 PROCEDURE — 99211 OFF/OP EST MAY X REQ PHY/QHP: CPT | Performed by: PHARMACIST

## 2017-11-09 NOTE — PROGRESS NOTES
The Medication Management [facility]  Anticoagulation Clinic  [phone] (phone)           [phone] (fax)    Visit Date: 11/9/2017     Subjective:       Patient ID: Carlos Britton, 78 y.o., male    HPI  Patient seen in clinic for anticoagulation management for diagnoses of persistent atrial fib, PE with a goal INR of 2.0-3.0. Last seen 3 weeks ago. States correct coumadin dose and tablet strength. Denies missed doses. Review of Systems   Constitutional: Negative for activity change, appetite change and fatigue. HENT: Negative for nosebleeds. Respiratory: Negative for shortness of breath. Cardiovascular: Negative for chest pain and leg swelling. Gastrointestinal: Negative for blood in stool, constipation and diarrhea. Genitourinary: Negative for hematuria. Neurological: Negative for dizziness, weakness, light-headedness and headaches. Hematological: Does not bruise/bleed easily.        Objective:   Physical Exam   Vitals:    11/09/17 1415   BP: (!) 144/73   Pulse: 101       Physical Exam   Constitutional: He is oriented to person, place, and time. He appears well-developed and well-nourished. Cardiovascular: Normal rate. Pulmonary/Chest: Effort normal.   Neurological: He is alert and oriented to person, place, and time. Psychiatric: He has a normal mood and affect. His behavior is normal.       Assessment:     Lab Results   Component Value Date    INR 3.40 (H) 11/09/2017    INR 2.90 (H) 10/12/2017    INR 2.70 (H) 09/19/2017    PROTIME 21.7 (A) 08/14/2017    PROTIME 52.5 (H) 08/12/2017    PROTIME 26.8 (H) 05/30/2015     INR therapeutic   Recent Labs      11/09/17   1413   INR  3.40*                           Plan:   POCT INR ordered and result reviewed. Continue Coumadin 5 mg po MF, 7.5 mg po TWTHSS. Recheck INR 4 weeks. Patient reminded to call the Anticoagulation Clinic with any signs or symptoms of bleeding or with any medication changes.   Patient given

## 2017-11-27 DIAGNOSIS — N40.0 BENIGN NON-NODULAR PROSTATIC HYPERPLASIA WITHOUT LOWER URINARY TRACT SYMPTOMS: ICD-10-CM

## 2017-11-28 RX ORDER — DUTASTERIDE 0.5 MG/1
CAPSULE, LIQUID FILLED ORAL
Qty: 90 CAPSULE | Refills: 1 | Status: SHIPPED | OUTPATIENT
Start: 2017-11-28 | End: 2018-02-20 | Stop reason: SDUPTHER

## 2017-11-29 ENCOUNTER — HOSPITAL ENCOUNTER (OUTPATIENT)
Dept: PHARMACY | Age: 77
Setting detail: THERAPIES SERIES
Discharge: HOME | End: 2017-11-29
Payer: MEDICARE

## 2017-11-29 DIAGNOSIS — I26.99 OTHER PULMONARY EMBOLISM WITHOUT ACUTE COR PULMONALE, UNSPECIFIED CHRONICITY (HCC): ICD-10-CM

## 2017-11-29 DIAGNOSIS — I48.19 PERSISTENT ATRIAL FIBRILLATION (HCC): ICD-10-CM

## 2017-11-29 LAB — POC INR: 2.4 (ref 0.8–1.2)

## 2017-11-29 PROCEDURE — 85610 PROTHROMBIN TIME: CPT

## 2017-11-29 PROCEDURE — 36416 COLLJ CAPILLARY BLOOD SPEC: CPT

## 2017-11-29 PROCEDURE — 99211 OFF/OP EST MAY X REQ PHY/QHP: CPT

## 2017-11-29 ASSESSMENT — ENCOUNTER SYMPTOMS
CONSTIPATION: 0
SHORTNESS OF BREATH: 1
BLOOD IN STOOL: 0
DIARRHEA: 0

## 2017-11-29 NOTE — PROGRESS NOTES
The Medication Management [facility]  Anticoagulation Clinic  [phone] (phone)           [phone] (fax)    Visit Date: 11/29/2017     Subjective:       Patient ID: Khalif Anne, 78 y.o., male    HPI  Patient seen in clinic for Warfarin management for diagnoses of persistent atrial fib./PE, per Dr. Phillip Luke. Luis's referral (3 week visit). States correct dose and tablet strength. Denies missed doses. Denies diet/medication change. Review of Systems   Constitutional: Negative for activity change, appetite change and fatigue. HENT: Negative for nosebleeds. Respiratory: Positive for shortness of breath (usual). Cardiovascular: Negative for chest pain and leg swelling. Gastrointestinal: Negative for blood in stool, constipation and diarrhea. Genitourinary: Negative for hematuria. Neurological: Negative for dizziness, weakness, light-headedness and headaches. Hematological: Does not bruise/bleed easily.        Objective:       Physical Exam   Constitutional: He is oriented to person, place, and time. He appears well-developed and well-nourished. Pulmonary/Chest: Effort normal.   Neurological: He is alert and oriented to person, place, and time. Skin: Skin is warm and dry. Psychiatric: He has a normal mood and affect. His behavior is normal.       Assessment:       Lab Results   Component Value Date    INR 2.40 (H) 11/29/2017    INR 3.40 (H) 11/09/2017    INR 2.90 (H) 10/12/2017    PROTIME 21.7 (A) 08/14/2017    PROTIME 52.5 (H) 08/12/2017    PROTIME 26.8 (H) 05/30/2015     INR therapeutic - goal 2-3. Recent Labs      11/29/17   1344   INR  2.40*                           Plan:   POCT INR ordered/performed/result reviewed. Continue PO Coumadin 5 mg  MF, 7.5 mg TWThSS. Recheck INR 4 weeks. Patient reminded to call the Anticoagulation Clinic with any signs or symptoms of bleeding or with any medication changes.   Patient given instructions utilizing the teach back method. Discharged ambulatory in no apparent distress, with cane and wife.

## 2017-12-13 RX ORDER — PAROXETINE HYDROCHLORIDE 20 MG/1
TABLET, FILM COATED ORAL
Qty: 90 TABLET | Refills: 1 | Status: SHIPPED | OUTPATIENT
Start: 2017-12-13 | End: 2018-06-26 | Stop reason: SDUPTHER

## 2017-12-27 ENCOUNTER — HOSPITAL ENCOUNTER (OUTPATIENT)
Dept: PHARMACY | Age: 77
Setting detail: THERAPIES SERIES
Discharge: HOME | End: 2017-12-27
Payer: MEDICARE

## 2017-12-27 DIAGNOSIS — I26.99 OTHER PULMONARY EMBOLISM WITHOUT ACUTE COR PULMONALE, UNSPECIFIED CHRONICITY (HCC): ICD-10-CM

## 2017-12-27 DIAGNOSIS — I48.19 PERSISTENT ATRIAL FIBRILLATION (HCC): ICD-10-CM

## 2017-12-27 LAB — POC INR: 2.3 (ref 0.8–1.2)

## 2017-12-27 PROCEDURE — 85610 PROTHROMBIN TIME: CPT

## 2017-12-27 PROCEDURE — 99211 OFF/OP EST MAY X REQ PHY/QHP: CPT

## 2017-12-27 PROCEDURE — 36416 COLLJ CAPILLARY BLOOD SPEC: CPT

## 2017-12-27 ASSESSMENT — ENCOUNTER SYMPTOMS
BLOOD IN STOOL: 0
SHORTNESS OF BREATH: 0
CONSTIPATION: 0
DIARRHEA: 0

## 2017-12-27 NOTE — PROGRESS NOTES
The Medication Management [facility]  Anticoagulation Clinic  [phone] (phone)           [phone] (fax)    Visit Date: 12/27/2017     Subjective:       Patient ID: Tawnya Iqbal, 78 y.o., male    HPI  Patient seen in clinic for Warfarin management for diagnoses of persistent atrial fib./PE, per Dr. Xiao Lassiter. Luis's referral (4 week visit). States correct dose and tablet strength. Denies missed doses. Denies diet change. Went to [facility] ER 12/18 - diagnosed with bronchitis; Zpak and Hydromet ordered. INR there 2.97. Went back to that ER 12/23 - diagnosed with fluid overload; extra PO diuretic ordered. Sees Dr. Katie Ly tomorrow (assume that office has obtained [facility] data). Review of Systems   Constitutional: Positive for activity change (decreased). Negative for appetite change and fatigue. HENT: Negative for nosebleeds. Respiratory: Negative for shortness of breath. Cardiovascular: Negative for chest pain and leg swelling. Gastrointestinal: Negative for blood in stool, constipation and diarrhea. Genitourinary: Negative for hematuria. Neurological: Negative for dizziness, weakness, light-headedness and headaches. Hematological: Does not bruise/bleed easily.        Objective:       Physical Exam   Constitutional: He is oriented to person, place, and time. He appears well-developed and well-nourished. Pulmonary/Chest: Effort normal.   Neurological: He is alert and oriented to person, place, and time. Skin: Skin is warm and dry. Psychiatric: He has a normal mood and affect. His behavior is normal.       Assessment:     Lab Results   Component Value Date    INR 2.30 (H) 12/27/2017    INR 2.97 (H) 12/18/2017    INR 2.40 (H) 11/29/2017    PROTIME 35.8 (H) 12/18/2017    PROTIME 21.7 (A) 08/14/2017    PROTIME 52.5 (H) 08/12/2017     INR therapeutic - goal 2-3.   Recent Labs      12/27/17   1359   INR  2.30*                             Plan:   POCT INR ordered/performed/result reviewed. Continue PO Coumadin 5 mg  MF, 7.5 mg TWThSS. Recheck INR 4 weeks. Patient reminded to call the Anticoagulation Clinic with any signs or symptoms of bleeding or with any medication changes. Patient given instructions utilizing the teach back method. Discharged ambulatory in no apparent distress, with cane and wife.

## 2017-12-28 ENCOUNTER — OFFICE VISIT (OUTPATIENT)
Dept: INTERNAL MEDICINE CLINIC | Age: 77
End: 2017-12-28
Payer: MEDICARE

## 2017-12-28 VITALS
OXYGEN SATURATION: 98 % | WEIGHT: 294 LBS | DIASTOLIC BLOOD PRESSURE: 88 MMHG | SYSTOLIC BLOOD PRESSURE: 138 MMHG | BODY MASS INDEX: 44.56 KG/M2 | HEART RATE: 92 BPM | HEIGHT: 68 IN

## 2017-12-28 DIAGNOSIS — I50.33 ACUTE ON CHRONIC DIASTOLIC CONGESTIVE HEART FAILURE (HCC): Primary | ICD-10-CM

## 2017-12-28 DIAGNOSIS — J40 BRONCHITIS: ICD-10-CM

## 2017-12-28 PROCEDURE — G8417 CALC BMI ABV UP PARAM F/U: HCPCS | Performed by: INTERNAL MEDICINE

## 2017-12-28 PROCEDURE — 4040F PNEUMOC VAC/ADMIN/RCVD: CPT | Performed by: INTERNAL MEDICINE

## 2017-12-28 PROCEDURE — 1123F ACP DISCUSS/DSCN MKR DOCD: CPT | Performed by: INTERNAL MEDICINE

## 2017-12-28 PROCEDURE — 1036F TOBACCO NON-USER: CPT | Performed by: INTERNAL MEDICINE

## 2017-12-28 PROCEDURE — 3017F COLORECTAL CA SCREEN DOC REV: CPT | Performed by: INTERNAL MEDICINE

## 2017-12-28 PROCEDURE — G8427 DOCREV CUR MEDS BY ELIG CLIN: HCPCS | Performed by: INTERNAL MEDICINE

## 2017-12-28 PROCEDURE — 99213 OFFICE O/P EST LOW 20 MIN: CPT | Performed by: INTERNAL MEDICINE

## 2017-12-28 PROCEDURE — G8482 FLU IMMUNIZE ORDER/ADMIN: HCPCS | Performed by: INTERNAL MEDICINE

## 2017-12-28 NOTE — PROGRESS NOTES
abdominal pain, change in bowel habits, or black or bloody stools  Genito-Urinary ROS: no dysuria, trouble voiding, or hematuria  Musculoskeletal ROS: negative for - muscle pain or muscular weakness, positive back and knee pain - chronic  Neurological ROS: negative for - confusion, dizziness, headaches, numbness/tingling, visual changes or weakness  Dermatological ROS: negative for - rash or skin lesion changes    Blood pressure 138/88, pulse 92, height 5' 8.11\" (1.73 m), weight 294 lb (133.4 kg), SpO2 98 %. Physical Examination: General appearance - alert, well appearing, and in no distress  Mental status - alert, oriented to person, place, and time  Neck - supple, no significant adenopathy, no JVD, or carotid bruits  Chest - clear to auscultation, no wheezes, rales or rhonchi, symmetric air entry  Heart - normal rate, irregular rhythm, no murmurs, rubs, clicks or gallops  Abdomen - soft, nontender, nondistended  Extremities - peripheral pulses normal, trace edema bilaterally to mid shin, no clubbing or cyanosis  Skin - normal coloration and turgor, warm, dry    Foot exam 10/31/17:  No lesions, sensation intact with monofilament testing.  +2 DP and PT pulses normal.  Fungal nail infection of all toenails, callous right 1st toe    Diagnostic Data:  I have reviewed recent diagnostic testing including labs 12/2017. Reviewed EKG/CXR from ER visit. Assessment/Plan:  1. Acute on chronic diastolic congestive heart failure ([address]     2. Bronchitis       No orders of the defined types were placed in this encounter. CHF diastolic - acute on chronic - reminded to take diuretic daily. Stable now. Bronchitis - cough resolving with Z-pk. Keep  follow up appointment 2/2018 follow-up chronic issues. Continue medications at current doses. Sebastien Alanis MD    [facility]  PHYSICIANS [place] [address]  [address]  [address] [place] [address] [address] [address]  Dept: [phone]  Dept Fax: [phone]  Loc: [phone]

## 2018-01-05 DIAGNOSIS — I10 ESSENTIAL HYPERTENSION: ICD-10-CM

## 2018-01-05 DIAGNOSIS — E11.9 TYPE 2 DIABETES MELLITUS WITHOUT COMPLICATION, WITHOUT LONG-TERM CURRENT USE OF INSULIN (HCC): ICD-10-CM

## 2018-01-05 RX ORDER — RAMIPRIL 10 MG
CAPSULE ORAL
Qty: 90 CAPSULE | Refills: 1 | Status: SHIPPED | OUTPATIENT
Start: 2018-01-05 | End: 2018-06-26 | Stop reason: SDUPTHER

## 2018-01-24 ENCOUNTER — HOSPITAL ENCOUNTER (OUTPATIENT)
Dept: PHARMACY | Age: 78
Setting detail: THERAPIES SERIES
Discharge: HOME | End: 2018-01-24
Payer: MEDICARE

## 2018-01-24 DIAGNOSIS — I26.99 OTHER PULMONARY EMBOLISM WITHOUT ACUTE COR PULMONALE, UNSPECIFIED CHRONICITY (HCC): ICD-10-CM

## 2018-01-24 DIAGNOSIS — I48.19 PERSISTENT ATRIAL FIBRILLATION (HCC): ICD-10-CM

## 2018-01-24 LAB — POC INR: 2.1 (ref 0.8–1.2)

## 2018-01-24 PROCEDURE — 36416 COLLJ CAPILLARY BLOOD SPEC: CPT

## 2018-01-24 PROCEDURE — 85610 PROTHROMBIN TIME: CPT

## 2018-01-24 PROCEDURE — 99211 OFF/OP EST MAY X REQ PHY/QHP: CPT

## 2018-01-24 ASSESSMENT — ENCOUNTER SYMPTOMS
DIARRHEA: 0
SHORTNESS OF BREATH: 0
CONSTIPATION: 0
BLOOD IN STOOL: 0

## 2018-01-26 ENCOUNTER — TELEPHONE (OUTPATIENT)
Dept: PHARMACY | Age: 78
End: 2018-01-26

## 2018-01-26 ENCOUNTER — TELEPHONE (OUTPATIENT)
Dept: INTERNAL MEDICINE CLINIC | Age: 78
End: 2018-01-26

## 2018-02-05 RX ORDER — EZETIMIBE AND SIMVASTATIN 10; 40 MG/1; MG/1
TABLET ORAL
Qty: 90 TABLET | Refills: 1 | Status: SHIPPED | OUTPATIENT
Start: 2018-02-05 | End: 2018-06-26 | Stop reason: SDUPTHER

## 2018-02-15 ENCOUNTER — TELEPHONE (OUTPATIENT)
Dept: INTERNAL MEDICINE CLINIC | Age: 78
End: 2018-02-15

## 2018-02-15 DIAGNOSIS — I10 ESSENTIAL HYPERTENSION: Primary | ICD-10-CM

## 2018-02-19 NOTE — TELEPHONE ENCOUNTER
Pt stated he only took 2 Bumex on Thursday and that was all it took, and he will discuss further with Dr. Milka Blank at his OV tomorrow. Feels he does not need to do a BMP before then.

## 2018-02-20 ENCOUNTER — OFFICE VISIT (OUTPATIENT)
Dept: INTERNAL MEDICINE CLINIC | Age: 78
End: 2018-02-20
Payer: MEDICARE

## 2018-02-20 VITALS
OXYGEN SATURATION: 95 % | SYSTOLIC BLOOD PRESSURE: 110 MMHG | HEART RATE: 64 BPM | HEIGHT: 68 IN | BODY MASS INDEX: 45.31 KG/M2 | DIASTOLIC BLOOD PRESSURE: 62 MMHG | WEIGHT: 299 LBS

## 2018-02-20 DIAGNOSIS — I48.19 PERSISTENT ATRIAL FIBRILLATION (HCC): ICD-10-CM

## 2018-02-20 DIAGNOSIS — E78.00 PURE HYPERCHOLESTEROLEMIA: ICD-10-CM

## 2018-02-20 DIAGNOSIS — F33.41 RECURRENT MAJOR DEPRESSIVE DISORDER, IN PARTIAL REMISSION (HCC): ICD-10-CM

## 2018-02-20 DIAGNOSIS — E11.9 TYPE 2 DIABETES MELLITUS WITHOUT COMPLICATION, WITHOUT LONG-TERM CURRENT USE OF INSULIN (HCC): ICD-10-CM

## 2018-02-20 DIAGNOSIS — F33.41 MAJOR DEPRESSIVE DISORDER, RECURRENT, IN PARTIAL REMISSION (HCC): ICD-10-CM

## 2018-02-20 DIAGNOSIS — E66.01 MORBID OBESITY (HCC): ICD-10-CM

## 2018-02-20 DIAGNOSIS — E66.01 MORBID OBESITY WITH BMI OF 45.0-49.9, ADULT (HCC): ICD-10-CM

## 2018-02-20 DIAGNOSIS — N40.0 BENIGN NON-NODULAR PROSTATIC HYPERPLASIA WITHOUT LOWER URINARY TRACT SYMPTOMS: ICD-10-CM

## 2018-02-20 DIAGNOSIS — I10 ESSENTIAL HYPERTENSION: Primary | ICD-10-CM

## 2018-02-20 PROCEDURE — G8427 DOCREV CUR MEDS BY ELIG CLIN: HCPCS | Performed by: INTERNAL MEDICINE

## 2018-02-20 PROCEDURE — 1036F TOBACCO NON-USER: CPT | Performed by: INTERNAL MEDICINE

## 2018-02-20 PROCEDURE — G8484 FLU IMMUNIZE NO ADMIN: HCPCS | Performed by: INTERNAL MEDICINE

## 2018-02-20 PROCEDURE — 99214 OFFICE O/P EST MOD 30 MIN: CPT | Performed by: INTERNAL MEDICINE

## 2018-02-20 PROCEDURE — 3017F COLORECTAL CA SCREEN DOC REV: CPT | Performed by: INTERNAL MEDICINE

## 2018-02-20 PROCEDURE — G8417 CALC BMI ABV UP PARAM F/U: HCPCS | Performed by: INTERNAL MEDICINE

## 2018-02-20 PROCEDURE — 4040F PNEUMOC VAC/ADMIN/RCVD: CPT | Performed by: INTERNAL MEDICINE

## 2018-02-20 PROCEDURE — 1123F ACP DISCUSS/DSCN MKR DOCD: CPT | Performed by: INTERNAL MEDICINE

## 2018-02-20 PROCEDURE — 3046F HEMOGLOBIN A1C LEVEL >9.0%: CPT | Performed by: INTERNAL MEDICINE

## 2018-02-20 RX ORDER — DUTASTERIDE 0.5 MG/1
CAPSULE, LIQUID FILLED ORAL
Qty: 90 CAPSULE | Refills: 1 | Status: SHIPPED | OUTPATIENT
Start: 2018-02-20 | End: 2018-02-20 | Stop reason: SDUPTHER

## 2018-02-20 NOTE — PROGRESS NOTES
Chief Complaint   Patient presents with    Hypertension    Hyperlipidemia    Diabetes    Atrial Fibrillation    Depression    Insomnia     since he had the flu 2 weeks ago       This patient presents for medical evaluation. I last saw the patient 4 months ago. This patient is followed by Dr. Ivon Campos. Hypertension - BP controlled today. He is forgeting to take night pills - metoprolol and Vytorin. Will change to metoprolol succinate in am and Vytorin in am.  He will continue Coumadin and metformin at supper. On Ramipril, and Metoprolol - unable to start Norvasc due to dose of Simvastatin. No headache or visual changes. Hyperlipidemia - change Vytorin to am dosing to help him remember it better. LDL 40 2/2017 - at goal, normal ALT 10/2017. No new muscle aches on statin. Continue Vytorin. Labs due on or after 3/1/18. Recent water over load - took an extra pill and improved. Was at a time with the flu. He is drinking Gatoraid which may worsen situation. Reminded him to stop Gatoraid. Depression - stable, continue Paxil. No more epistaxis. Morbid obesity - BMI 44.66 -> 43.15->44.93 ->44.41 -> 44.56->45. 3. He is watching what he eats - he states he doesn't want to be on more medication for diabetes. Today she states he is being good with keeping sweets in moderation. Again encouraged him to be more active - walk around the store with wife instead of sitting while she shops. Joint pain is still a major issue. Offered to send to ortho for steroid injections but he refuses. Will try to lose weight - work on portion size. Wife states that now that she can't see - he is trying to get more junk food in grocery. But she is trying to encourage him to eat better. He was diagnosed with DM in 2012 but just used diet control until 2/2016 when HgA1c 7.8 - started metformin 500 mg BID - main complaint is gas. Offered metformin XR but wanted to stay with current pill.   Last eye exam

## 2018-02-21 ENCOUNTER — HOSPITAL ENCOUNTER (OUTPATIENT)
Dept: PHARMACY | Age: 78
Setting detail: THERAPIES SERIES
Discharge: HOME | End: 2018-02-21
Payer: MEDICARE

## 2018-02-21 DIAGNOSIS — I26.99 OTHER PULMONARY EMBOLISM WITHOUT ACUTE COR PULMONALE, UNSPECIFIED CHRONICITY (HCC): ICD-10-CM

## 2018-02-21 DIAGNOSIS — I48.19 PERSISTENT ATRIAL FIBRILLATION (HCC): ICD-10-CM

## 2018-02-21 LAB — POC INR: 2.9 (ref 0.8–1.2)

## 2018-02-21 PROCEDURE — 85610 PROTHROMBIN TIME: CPT

## 2018-02-21 PROCEDURE — 99211 OFF/OP EST MAY X REQ PHY/QHP: CPT

## 2018-02-21 PROCEDURE — 36416 COLLJ CAPILLARY BLOOD SPEC: CPT

## 2018-02-21 RX ORDER — DUTASTERIDE 0.5 MG/1
CAPSULE, LIQUID FILLED ORAL
Qty: 90 CAPSULE | Refills: 1 | Status: SHIPPED | OUTPATIENT
Start: 2018-02-21 | End: 2018-06-26 | Stop reason: SDUPTHER

## 2018-02-21 ASSESSMENT — ENCOUNTER SYMPTOMS
SHORTNESS OF BREATH: 0
BLOOD IN STOOL: 0
DIARRHEA: 0
CONSTIPATION: 0

## 2018-03-04 PROBLEM — F33.41 RECURRENT MAJOR DEPRESSIVE DISORDER, IN PARTIAL REMISSION (HCC): Status: ACTIVE | Noted: 2018-03-04

## 2018-03-05 RX ORDER — BUMETANIDE 1 MG/1
TABLET ORAL
Qty: 90 TABLET | Refills: 1 | Status: SHIPPED | OUTPATIENT
Start: 2018-03-05 | End: 2018-08-31 | Stop reason: SDUPTHER

## 2018-03-06 ENCOUNTER — HOSPITAL ENCOUNTER (OUTPATIENT)
Dept: PHARMACY | Age: 78
Setting detail: THERAPIES SERIES
Discharge: HOME | End: 2018-03-06
Payer: MEDICARE

## 2018-03-06 ENCOUNTER — HOSPITAL ENCOUNTER (OUTPATIENT)
Age: 78
Discharge: HOME | End: 2018-03-06
Payer: MEDICARE

## 2018-03-06 DIAGNOSIS — I48.19 PERSISTENT ATRIAL FIBRILLATION (HCC): ICD-10-CM

## 2018-03-06 DIAGNOSIS — I26.99 OTHER PULMONARY EMBOLISM WITHOUT ACUTE COR PULMONALE, UNSPECIFIED CHRONICITY (HCC): ICD-10-CM

## 2018-03-06 DIAGNOSIS — E11.9 TYPE 2 DIABETES MELLITUS WITHOUT COMPLICATION, WITHOUT LONG-TERM CURRENT USE OF INSULIN (HCC): ICD-10-CM

## 2018-03-06 DIAGNOSIS — I10 ESSENTIAL HYPERTENSION: ICD-10-CM

## 2018-03-06 DIAGNOSIS — E78.00 PURE HYPERCHOLESTEROLEMIA: ICD-10-CM

## 2018-03-06 LAB
ALT SERPL-CCNC: 15 U/L (ref 11–66)
ANION GAP SERPL CALCULATED.3IONS-SCNC: 15 MEQ/L (ref 8–16)
AVERAGE GLUCOSE: 150 MG/DL (ref 70–126)
BUN BLDV-MCNC: 23 MG/DL (ref 7–22)
CALCIUM SERPL-MCNC: 9.1 MG/DL (ref 8.5–10.5)
CHLORIDE BLD-SCNC: 102 MEQ/L (ref 98–111)
CHOLESTEROL, TOTAL: 120 MG/DL (ref 100–199)
CO2: 26 MEQ/L (ref 23–33)
CREAT SERPL-MCNC: 0.8 MG/DL (ref 0.4–1.2)
CREATININE, URINE: 140.6 MG/DL
GFR SERPL CREATININE-BSD FRML MDRD: > 90 ML/MIN/1.73M2
GLUCOSE BLD-MCNC: 146 MG/DL (ref 70–108)
HBA1C MFR BLD: 7 % (ref 4.4–6.4)
HDLC SERPL-MCNC: 60 MG/DL
LDL CHOLESTEROL CALCULATED: 37 MG/DL
MICROALBUMIN UR-MCNC: 4.47 MG/DL
MICROALBUMIN/CREAT UR-RTO: 32 MG/G (ref 0–30)
POC INR: 2.1 (ref 0.8–1.2)
POTASSIUM SERPL-SCNC: 4.1 MEQ/L (ref 3.5–5.2)
SODIUM BLD-SCNC: 143 MEQ/L (ref 135–145)
TRIGL SERPL-MCNC: 117 MG/DL (ref 0–199)

## 2018-03-06 PROCEDURE — 80048 BASIC METABOLIC PNL TOTAL CA: CPT

## 2018-03-06 PROCEDURE — 36415 COLL VENOUS BLD VENIPUNCTURE: CPT

## 2018-03-06 PROCEDURE — 84460 ALANINE AMINO (ALT) (SGPT): CPT

## 2018-03-06 PROCEDURE — 36416 COLLJ CAPILLARY BLOOD SPEC: CPT

## 2018-03-06 PROCEDURE — 82043 UR ALBUMIN QUANTITATIVE: CPT

## 2018-03-06 PROCEDURE — 80061 LIPID PANEL: CPT

## 2018-03-06 PROCEDURE — 83036 HEMOGLOBIN GLYCOSYLATED A1C: CPT

## 2018-03-06 PROCEDURE — 85610 PROTHROMBIN TIME: CPT

## 2018-03-06 PROCEDURE — 99211 OFF/OP EST MAY X REQ PHY/QHP: CPT

## 2018-03-06 ASSESSMENT — ENCOUNTER SYMPTOMS
CONSTIPATION: 0
BLOOD IN STOOL: 0
DIARRHEA: 1
SHORTNESS OF BREATH: 0

## 2018-03-06 NOTE — PROGRESS NOTES
The Medication Management [facility]  Anticoagulation Clinic  [phone] (phone)           [phone] (fax)    Visit Date: 3/6/2018     Subjective:       Patient ID: Tameka Vasquez, 78 y.o., male    HPI  Patient seen in clinic for Warfarin management for diagnoses of persistent atrial fib./PE, per Dr. Mariely Smith's referral (2 week visit). States correct dose and tablet strength. Had 1 tooth pulled 2/27, for which dentist wanted Coumadin held 3 days before; as ordered, took 12.5 mg 2/27, 10 mg next 2 days, then usual dose. Took a couple of Norco, then discarded. No antibiotic. Denies diet change. Review of Systems   Constitutional: Negative for activity change, appetite change and fatigue. HENT: Negative for nosebleeds. Respiratory: Negative for shortness of breath. Cardiovascular: Negative for chest pain and leg swelling. Gastrointestinal: Positive for diarrhea (if doesn't eat with Metformin). Negative for blood in stool and constipation. Genitourinary: Negative for hematuria. Neurological: Negative for dizziness, weakness, light-headedness and headaches. Hematological: Does not bruise/bleed easily.        Objective:       Physical Exam   Constitutional: He is oriented to person, place, and time. He appears well-developed and well-nourished. Pulmonary/Chest: Effort normal.   Neurological: He is alert and oriented to person, place, and time. Skin: Skin is warm and dry. Psychiatric: He has a normal mood and affect. His behavior is normal.       Assessment:     Lab Results   Component Value Date    INR 2.10 (H) 03/06/2018    INR 2.90 (H) 02/21/2018    INR 2.10 (H) 01/24/2018    PROTIME 35.8 (H) 12/18/2017    PROTIME 21.7 (A) 08/14/2017    PROTIME 52.5 (H) 08/12/2017     INR therapeutic - goal 2-3. Recent Labs      03/06/18   1442   INR  2.10*     Plan:   POCT INR ordered/performed/result reviewed. Continue PO Coumadin 5 mg MF, 7.5 mg TWThSS.   Recheck

## 2018-03-13 ENCOUNTER — TELEPHONE (OUTPATIENT)
Dept: INTERNAL MEDICINE CLINIC | Age: 78
End: 2018-03-13

## 2018-03-13 NOTE — TELEPHONE ENCOUNTER
----- Message from Taylor Mcdowell MD sent at 3/9/2018  1:37 PM EST -----  Let him know labs are acceptable - need to watch the sugar - HgA1c 7.0.

## 2018-03-19 RX ORDER — METOPROLOL SUCCINATE 100 MG/1
100 TABLET, EXTENDED RELEASE ORAL DAILY
Qty: 90 TABLET | Refills: 1 | Status: SHIPPED | OUTPATIENT
Start: 2018-03-19 | End: 2018-08-31 | Stop reason: SDUPTHER

## 2018-03-25 PROBLEM — I50.33 ACUTE ON CHRONIC DIASTOLIC CONGESTIVE HEART FAILURE (HCC): Status: ACTIVE | Noted: 2018-03-25

## 2018-04-03 ENCOUNTER — HOSPITAL ENCOUNTER (OUTPATIENT)
Dept: PHARMACY | Age: 78
Setting detail: THERAPIES SERIES
Discharge: HOME | End: 2018-04-03
Payer: MEDICARE

## 2018-04-03 DIAGNOSIS — I48.19 PERSISTENT ATRIAL FIBRILLATION (HCC): ICD-10-CM

## 2018-04-03 DIAGNOSIS — I26.99 OTHER PULMONARY EMBOLISM WITHOUT ACUTE COR PULMONALE, UNSPECIFIED CHRONICITY (HCC): ICD-10-CM

## 2018-04-03 LAB — POC INR: 2.6 (ref 0.8–1.2)

## 2018-04-03 PROCEDURE — 85610 PROTHROMBIN TIME: CPT

## 2018-04-03 PROCEDURE — 36416 COLLJ CAPILLARY BLOOD SPEC: CPT

## 2018-04-03 PROCEDURE — 99211 OFF/OP EST MAY X REQ PHY/QHP: CPT

## 2018-04-24 ENCOUNTER — TELEPHONE (OUTPATIENT)
Dept: INTERNAL MEDICINE CLINIC | Age: 78
End: 2018-04-24

## 2018-04-24 DIAGNOSIS — J20.9 ACUTE BRONCHITIS, UNSPECIFIED ORGANISM: Primary | ICD-10-CM

## 2018-04-24 RX ORDER — CEFUROXIME AXETIL 250 MG/1
250 TABLET ORAL 2 TIMES DAILY
Qty: 14 TABLET | Refills: 0 | Status: SHIPPED | OUTPATIENT
Start: 2018-04-24 | End: 2018-05-01

## 2018-04-25 ENCOUNTER — TELEPHONE (OUTPATIENT)
Dept: PHARMACY | Age: 78
End: 2018-04-25

## 2018-04-30 ENCOUNTER — TELEPHONE (OUTPATIENT)
Dept: INTERNAL MEDICINE CLINIC | Age: 78
End: 2018-04-30

## 2018-05-02 ENCOUNTER — HOSPITAL ENCOUNTER (OUTPATIENT)
Dept: PHARMACY | Age: 78
Setting detail: THERAPIES SERIES
Discharge: HOME | End: 2018-05-02
Payer: MEDICARE

## 2018-05-02 DIAGNOSIS — I48.19 PERSISTENT ATRIAL FIBRILLATION (HCC): ICD-10-CM

## 2018-05-02 DIAGNOSIS — I26.99 OTHER PULMONARY EMBOLISM WITHOUT ACUTE COR PULMONALE, UNSPECIFIED CHRONICITY (HCC): ICD-10-CM

## 2018-05-02 LAB — POC INR: 2.5 (ref 0.8–1.2)

## 2018-05-02 PROCEDURE — 36416 COLLJ CAPILLARY BLOOD SPEC: CPT

## 2018-05-02 PROCEDURE — 99211 OFF/OP EST MAY X REQ PHY/QHP: CPT

## 2018-05-02 PROCEDURE — 85610 PROTHROMBIN TIME: CPT

## 2018-05-30 ENCOUNTER — HOSPITAL ENCOUNTER (OUTPATIENT)
Dept: PHARMACY | Age: 78
Setting detail: THERAPIES SERIES
Discharge: HOME | End: 2018-05-30
Payer: MEDICARE

## 2018-05-30 DIAGNOSIS — I48.19 PERSISTENT ATRIAL FIBRILLATION (HCC): ICD-10-CM

## 2018-05-30 DIAGNOSIS — I27.82 OTHER CHRONIC PULMONARY EMBOLISM WITHOUT ACUTE COR PULMONALE (HCC): ICD-10-CM

## 2018-05-30 LAB — POC INR: 2.6 (ref 0.8–1.2)

## 2018-05-30 PROCEDURE — 99211 OFF/OP EST MAY X REQ PHY/QHP: CPT | Performed by: PHARMACIST

## 2018-05-30 PROCEDURE — 36416 COLLJ CAPILLARY BLOOD SPEC: CPT | Performed by: PHARMACIST

## 2018-05-30 PROCEDURE — 85610 PROTHROMBIN TIME: CPT | Performed by: PHARMACIST

## 2018-06-26 ENCOUNTER — OFFICE VISIT (OUTPATIENT)
Dept: INTERNAL MEDICINE CLINIC | Age: 78
End: 2018-06-26
Payer: MEDICARE

## 2018-06-26 VITALS
HEART RATE: 72 BPM | DIASTOLIC BLOOD PRESSURE: 70 MMHG | BODY MASS INDEX: 44.1 KG/M2 | WEIGHT: 291 LBS | SYSTOLIC BLOOD PRESSURE: 122 MMHG | HEIGHT: 68 IN

## 2018-06-26 DIAGNOSIS — E66.01 MORBID OBESITY (HCC): ICD-10-CM

## 2018-06-26 DIAGNOSIS — N40.0 BENIGN NON-NODULAR PROSTATIC HYPERPLASIA WITHOUT LOWER URINARY TRACT SYMPTOMS: ICD-10-CM

## 2018-06-26 DIAGNOSIS — E78.00 PURE HYPERCHOLESTEROLEMIA: ICD-10-CM

## 2018-06-26 DIAGNOSIS — F33.41 RECURRENT MAJOR DEPRESSIVE DISORDER, IN PARTIAL REMISSION (HCC): ICD-10-CM

## 2018-06-26 DIAGNOSIS — E11.9 TYPE 2 DIABETES MELLITUS WITHOUT COMPLICATION, WITHOUT LONG-TERM CURRENT USE OF INSULIN (HCC): Primary | ICD-10-CM

## 2018-06-26 DIAGNOSIS — I10 ESSENTIAL HYPERTENSION: ICD-10-CM

## 2018-06-26 PROCEDURE — 4040F PNEUMOC VAC/ADMIN/RCVD: CPT | Performed by: INTERNAL MEDICINE

## 2018-06-26 PROCEDURE — 1123F ACP DISCUSS/DSCN MKR DOCD: CPT | Performed by: INTERNAL MEDICINE

## 2018-06-26 PROCEDURE — 1036F TOBACCO NON-USER: CPT | Performed by: INTERNAL MEDICINE

## 2018-06-26 PROCEDURE — 2022F DILAT RTA XM EVC RTNOPTHY: CPT | Performed by: INTERNAL MEDICINE

## 2018-06-26 PROCEDURE — 3045F PR MOST RECENT HEMOGLOBIN A1C LEVEL 7.0-9.0%: CPT | Performed by: INTERNAL MEDICINE

## 2018-06-26 PROCEDURE — 99214 OFFICE O/P EST MOD 30 MIN: CPT | Performed by: INTERNAL MEDICINE

## 2018-06-26 PROCEDURE — G8427 DOCREV CUR MEDS BY ELIG CLIN: HCPCS | Performed by: INTERNAL MEDICINE

## 2018-06-26 PROCEDURE — 3017F COLORECTAL CA SCREEN DOC REV: CPT | Performed by: INTERNAL MEDICINE

## 2018-06-26 PROCEDURE — G8417 CALC BMI ABV UP PARAM F/U: HCPCS | Performed by: INTERNAL MEDICINE

## 2018-06-26 RX ORDER — EZETIMIBE AND SIMVASTATIN 10; 40 MG/1; MG/1
TABLET ORAL
Qty: 90 TABLET | Refills: 1 | Status: SHIPPED | OUTPATIENT
Start: 2018-06-26 | End: 2019-01-07 | Stop reason: SDUPTHER

## 2018-06-26 RX ORDER — PAROXETINE HYDROCHLORIDE 20 MG/1
TABLET, FILM COATED ORAL
Qty: 90 TABLET | Refills: 1 | Status: SHIPPED | OUTPATIENT
Start: 2018-06-26 | End: 2018-12-23 | Stop reason: SDUPTHER

## 2018-06-26 RX ORDER — METFORMIN HYDROCHLORIDE 500 MG/1
1000 TABLET, EXTENDED RELEASE ORAL
Qty: 180 TABLET | Refills: 1 | Status: SHIPPED | OUTPATIENT
Start: 2018-06-26 | End: 2018-10-11 | Stop reason: SDUPTHER

## 2018-06-26 RX ORDER — RAMIPRIL 10 MG/1
CAPSULE ORAL
Qty: 90 CAPSULE | Refills: 1 | Status: SHIPPED | OUTPATIENT
Start: 2018-06-26 | End: 2018-12-23 | Stop reason: SDUPTHER

## 2018-06-26 RX ORDER — DUTASTERIDE 0.5 MG/1
CAPSULE, LIQUID FILLED ORAL
Qty: 90 CAPSULE | Refills: 1 | Status: SHIPPED | OUTPATIENT
Start: 2018-06-26 | End: 2018-10-11 | Stop reason: SDUPTHER

## 2018-06-26 NOTE — PROGRESS NOTES
Chief Complaint   Patient presents with    Hypertension    Hyperlipidemia    Diabetes    Obesity    Benign Prostatic Hypertrophy       This patient presents for medical evaluation. I last saw the patient 4 months ago. This patient is followed by Dr. Garima Sow. Hypertension - BP controlled today. He was forgeting to take night pills - metoprolol and Vytorin. Changed to metoprolol succinate in am and Vytorin in am.  He will continue Coumadin and metformin at supper. On Ramipril, and Metoprolol - unable to start Norvasc due to dose of Simvastatin. No headache or visual changes. He was diagnosed with DM in 2012 but just used diet control until 2/2016 when HgA1c 7.8 - started metformin 500 mg BID - main complaint is gas. Offered metformin XR but wanted to stay with current pill. Last eye exam 12/14/17 Dr. Alejandro Farr - no diabetic retinopathy -note on chart. He denies foot lesions - exam 10/31/17. On ASA, Vytorin, and Ramipril. Denies autonomic dysfunction, neuropathy, no nephropathy - microalbumin/Cr 32 slightly elevated 3/2018. BMP normal 3/2017. HgA1c 7.0 3/2018. He is complaining of diarrhea with metformin - will switch to ER and monitor. He is skipping pills to go to [employer], or if going out. Hyperlipidemia - change Vytorin to am dosing to help him remember it better. LDL 40 2/2017 - at goal, normal ALT 10/2017. No new muscle aches on statin. Continue Vytorin. LDL 37, HDL 60, at goal, ALT 3/2018. Recent water over load - took an extra pill and improved. Was at a time with the flu. He is drinking Gatoraid which may worsen situation. Reminded him to stop Gatoraid. No recent issues with fluid overload. Depression - stable, continue Paxil. Issues with anger with family - stays away from them. He refused counseling but will see his preacher if feels he needs it. Wife helps him work through it. No more epistaxis.     Morbid obesity - BMI 44.66 -> 43.15->44.93 ->44.41 -> not to do CV (due to his weight) per patient's report. He states he is no longer seeing Dr. Dionte Iyer. I will continue his Metoprolol and Coumadin. Discussed Eliquis in past, but he wanted to stick with Coumadin. He is still in Atrial fibrillation with a CHADS score of 4 (hypertension, DM and history of PE) - so I would favor continuing Coumadin as long as he is no longer bleeding. No issues since last visit with bleeding or CVA symptoms. He had issues with hematuria/passing blood clots. Seen in [facility] ER 5/30/15 - no UTI by culture but large amount of blood, INR at the time was 2.4 with a normal Hg, kidney function. Saw Dr. Jourdan Puri and work-up continuing. Cystoscopy without bladder lesion. CT done 6/18/15 with non-obstructing stones and possible bladder diverticulum, to see Dr. Jourdan Puri Tuesday to discuss. He was told hematuria was due to BPH and no hematuria since starting Proscar. He stopped Coumadin for 8 days till saw Dr. Jourdan Puri, then held a couple days. He states he has been back on Coumadin a week and no hematuria. Continues to be resolved. Saw Dr. Jourdan Puri 9/2015 with good report. 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 [facility] sent him by helicopter to [facility]. He had directed thrombolytics given. He states that the venous doppler of bilateral legs were negative. He was placed on Coumadin and sent to [address] [place] [address] - I will manage with them. INR is therapeutic. He has noticed right leg edema - stable and slowly getting better. He is sleeping in recliner. Suggested he needs to elevate above the level of his heart and wear compression hose at all times - may take off at night if legs are elevated. He states edema has improved since last visit. He was no longer wearing compression hose. Suggested getting back into it to prevent edema from coming back. OA - Recommend no Ibuprofen now that he is on Coumadin. He is using Tylenol prn instead.     Past Medical History: Diagnosis Date    Allergic rhinitis     Atrial fibrillation ([facility] 2/2014    Depression     Diabetes mellitus type II 1/2012    diagnosed with HgA1c 6.6    Erectile dysfunction     Hyperlipidemia     Hypertension     Lower extremity edema     LV dysfunction     h/o, normal now    Morbid obesity ([facility]     Osteoarthritis     left knee, right foot, back    PE (pulmonary embolism) 2/2014     Past Surgical History:   Procedure Laterality Date    CHOLECYSTECTOMY      PULMONARY EMBOLISM SURGERY Bilateral 2/8/2014           Allergies   Allergen Reactions    Lipitor Other (See Comments)     myalgia    Pcn [Penicillins] Rash     Social History     Social History    Marital status:      Spouse name: N/A    Number of children: N/A    Years of education: N/A     Occupational History    Not on file. Social History Main Topics    Smoking status: Former Smoker     Packs/day: 1.50     Years: 15.00     Types: Cigarettes     Quit date: 11/1/1984    Smokeless tobacco: Former User     Types: Snuff, Chew    Alcohol use No    Drug use: No    Sexual activity: Not on file     Other Topics Concern    Not on file     Social History Narrative    No narrative on file     Family History   Problem Relation Age of Onset    Cancer Mother         esophagus    Cancer Father         colon    Cancer Brother      Review of Systems - General ROS: negative for - chills or fever  Psychological ROS: positive for - anxiety or depression, controlled on meds, no SI/HI  Hematological and Lymphatic ROS: No history of blood clots or bleeding disorder.    Respiratory ROS: no cough, shortness of breath, or wheezing  Cardiovascular ROS: no chest pain, positive chronic dyspnea on exertion, morbidly obese - better with weight loss  Gastrointestinal ROS: no abdominal pain, change in bowel habits, or black or bloody stools  Genito-Urinary ROS: no dysuria, trouble voiding, or hematuria  Musculoskeletal ROS: negative for - muscle PARoxetine (PAXIL) 20 MG tablet         Orders Placed This Encounter   Procedures    Basic Metabolic Panel     Standing Status:   Future     Standing Expiration Date:   6/26/2019    Hemoglobin A1C     Standing Status:   Future     Standing Expiration Date:   6/26/2019    ALT     Standing Status:   Future     Standing Expiration Date:   6/26/2019    CBC Auto Differential     Standing Status:   Future     Standing Expiration Date:   6/26/2019     DM- HgA1c at goal. Eye exam done 12/2017. Will change metformin to ER to deal with diarrhea issue. Hypertension - BP controlled, continue current medication as above. Hypercholesterolemia - at goal on statin - checked Lipid panel 3/2018, with ALT. Morbid obesity - see plan above. Tried to make specific goals. He is improving. Now trying to focus on walking more. BPH - stable, continue Avodart. Depression - stable but will occasionally complain of depression - continue Paxil. Encouraged counseling. Atrial fib - on Coumadin, CHADS 4. No bleeding or signs of CVA. Will schedule follow up appointment in 4 months follow-up chronic issues. Continue medications at current doses. Labs due 9/2018. Blu Rosado MD    [facility] PROFESSIONAL SERVS  PHYSICIANS [facility]. [facility]  [facility] [address]  [address]  [address]  Dept: [phone]  Dept Fax: [phone] : [phone]

## 2018-06-27 ENCOUNTER — HOSPITAL ENCOUNTER (OUTPATIENT)
Dept: PHARMACY | Age: 78
Setting detail: THERAPIES SERIES
Discharge: HOME | End: 2018-06-27
Payer: MEDICARE

## 2018-06-27 DIAGNOSIS — I27.82 OTHER CHRONIC PULMONARY EMBOLISM WITHOUT ACUTE COR PULMONALE (HCC): ICD-10-CM

## 2018-06-27 DIAGNOSIS — I48.19 PERSISTENT ATRIAL FIBRILLATION (HCC): ICD-10-CM

## 2018-06-27 LAB — POC INR: 3.3 (ref 0.8–1.2)

## 2018-06-27 PROCEDURE — 36416 COLLJ CAPILLARY BLOOD SPEC: CPT

## 2018-06-27 PROCEDURE — 99211 OFF/OP EST MAY X REQ PHY/QHP: CPT

## 2018-06-27 PROCEDURE — 85610 PROTHROMBIN TIME: CPT

## 2018-06-28 RX ORDER — FLUTICASONE PROPIONATE 50 MCG
SPRAY, SUSPENSION (ML) NASAL
Qty: 48 G | Refills: 1 | Status: SHIPPED | OUTPATIENT
Start: 2018-06-28 | End: 2018-12-11 | Stop reason: SDUPTHER

## 2018-07-25 ENCOUNTER — HOSPITAL ENCOUNTER (OUTPATIENT)
Dept: PHARMACY | Age: 78
Setting detail: THERAPIES SERIES
Discharge: HOME | End: 2018-07-25
Payer: MEDICARE

## 2018-07-25 DIAGNOSIS — I27.82 OTHER CHRONIC PULMONARY EMBOLISM WITHOUT ACUTE COR PULMONALE (HCC): ICD-10-CM

## 2018-07-25 DIAGNOSIS — I48.19 PERSISTENT ATRIAL FIBRILLATION (HCC): ICD-10-CM

## 2018-07-25 LAB — POC INR: 3.2 (ref 0.8–1.2)

## 2018-07-25 PROCEDURE — 99211 OFF/OP EST MAY X REQ PHY/QHP: CPT

## 2018-07-25 PROCEDURE — 36416 COLLJ CAPILLARY BLOOD SPEC: CPT

## 2018-07-25 PROCEDURE — 85610 PROTHROMBIN TIME: CPT

## 2018-08-22 ENCOUNTER — HOSPITAL ENCOUNTER (OUTPATIENT)
Dept: PHARMACY | Age: 78
Setting detail: THERAPIES SERIES
Discharge: HOME | End: 2018-08-22
Payer: MEDICARE

## 2018-08-22 DIAGNOSIS — I27.82 OTHER CHRONIC PULMONARY EMBOLISM WITHOUT ACUTE COR PULMONALE (HCC): ICD-10-CM

## 2018-08-22 DIAGNOSIS — I48.19 PERSISTENT ATRIAL FIBRILLATION (HCC): ICD-10-CM

## 2018-08-22 LAB — POC INR: 3.2 (ref 0.8–1.2)

## 2018-08-22 PROCEDURE — 36416 COLLJ CAPILLARY BLOOD SPEC: CPT

## 2018-08-22 PROCEDURE — 99211 OFF/OP EST MAY X REQ PHY/QHP: CPT

## 2018-08-22 PROCEDURE — 85610 PROTHROMBIN TIME: CPT

## 2018-08-22 NOTE — PROGRESS NOTES
Medication Management [facility]  Anticoagulation Clinic  [phone] (phone)  [phone] (fax)      Mr. Allen Hsieh is a 70 y.o.  male with history of recurrent PE/persistent atrial fib. , per Dr. Debora Younger referral, who presents today for Warfarin monitoring and adjustment (4 week visit). Patient verifies current tablet strength. Has good supply. Has been taking 5 mg MWF, 7.5 mg TThSS, instead of 5 mg MF, 7.5 mg TWThSS. \"I should look at those instructions. \"  No extra doses. Missed 2.5 mg dose 7/25. Patient denies bleeding/bruising/swelling/chest pain. Has usual GARCÍA. No blood in urine or stool. No dietary changes. No changes in medication/OTC agents/herbals. No change in alcohol use or tobacco use. No change in activity level. Patient denies headaches/dizziness/lightheadedness/falls. No vomiting/diarrhea or acute illness. No procedures scheduled in the future at this time. Assessment:   Lab Results   Component Value Date    INR 3.20 (H) 08/22/2018    INR 3.20 (H) 07/25/2018    INR 3.30 (H) 06/27/2018    PROTIME 35.8 (H) 12/18/2017    PROTIME 21.7 (A) 08/14/2017    PROTIME 52.5 (H) 08/12/2017     INR supratherapeutic - goal 2-3. Recent Labs      08/22/18   1349   INR  3.20*       Plan:  POCT INR ordered/performed/result reviewed. 5 mg today, W, then decrease PO Coumadin to 7.5 mg MWF, 5 mg TThSS. Recheck INR in 2-3 weeks. (Report given - orders entered by Arline Childers., PharmD.)  Patient became adamant that he would not return until 4 weeks, but finally agreed to 3 weeks. Patient reminded to call the Anticoagulation Clinic with signs or symptoms of bleeding or with any medication changes. Patient given instructions utilizing the teach back method. Discharged ambulatory in no apparent distress, with cane and wife. After visit summary printed and reviewed with patient.       Medications reviewed and updated on home medication list.   Influenza vaccine:

## 2018-08-31 RX ORDER — BUMETANIDE 1 MG/1
TABLET ORAL
Qty: 90 TABLET | Refills: 1 | Status: SHIPPED | OUTPATIENT
Start: 2018-08-31 | End: 2019-02-27 | Stop reason: SDUPTHER

## 2018-08-31 RX ORDER — METOPROLOL SUCCINATE 100 MG
TABLET, EXTENDED RELEASE 24 HR ORAL
Qty: 90 TABLET | Refills: 1 | Status: SHIPPED | OUTPATIENT
Start: 2018-08-31 | End: 2019-02-27 | Stop reason: SDUPTHER

## 2018-09-11 ENCOUNTER — HOSPITAL ENCOUNTER (OUTPATIENT)
Age: 78
Discharge: HOME | End: 2018-09-11
Payer: MEDICARE

## 2018-09-11 LAB
ALT SERPL-CCNC: 14 U/L (ref 11–66)
ANION GAP SERPL CALCULATED.3IONS-SCNC: 13 MEQ/L (ref 8–16)
AVERAGE GLUCOSE: 150 MG/DL (ref 70–126)
BASOPHILS # BLD: 0.3 %
BASOPHILS ABSOLUTE: 0 THOU/MM3 (ref 0–0.1)
BUN BLDV-MCNC: 23 MG/DL (ref 7–22)
CALCIUM SERPL-MCNC: 9.2 MG/DL (ref 8.5–10.5)
CHLORIDE BLD-SCNC: 103 MEQ/L (ref 98–111)
CO2: 27 MEQ/L (ref 23–33)
CREAT SERPL-MCNC: 0.8 MG/DL (ref 0.4–1.2)
EOSINOPHIL # BLD: 1.8 %
EOSINOPHILS ABSOLUTE: 0.1 THOU/MM3 (ref 0–0.4)
ERYTHROCYTE [DISTWIDTH] IN BLOOD BY AUTOMATED COUNT: 13.4 % (ref 11.5–14.5)
ERYTHROCYTE [DISTWIDTH] IN BLOOD BY AUTOMATED COUNT: 52.4 FL (ref 35–45)
GFR SERPL CREATININE-BSD FRML MDRD: > 90 ML/MIN/1.73M2
GLUCOSE BLD-MCNC: 159 MG/DL (ref 70–108)
HBA1C MFR BLD: 7 % (ref 4.4–6.4)
HCT VFR BLD CALC: 45.9 % (ref 42–52)
HEMOGLOBIN: 14.8 GM/DL (ref 14–18)
IMMATURE GRANS (ABS): 0.04 THOU/MM3 (ref 0–0.07)
IMMATURE GRANULOCYTES: 0.5 %
LYMPHOCYTES # BLD: 17.2 %
LYMPHOCYTES ABSOLUTE: 1.3 THOU/MM3 (ref 1–4.8)
MCH RBC QN AUTO: 34 PG (ref 26–33)
MCHC RBC AUTO-ENTMCNC: 32.2 GM/DL (ref 32.2–35.5)
MCV RBC AUTO: 105.5 FL (ref 80–94)
MONOCYTES # BLD: 7.9 %
MONOCYTES ABSOLUTE: 0.6 THOU/MM3 (ref 0.4–1.3)
NUCLEATED RED BLOOD CELLS: 0 /100 WBC
PLATELET # BLD: 211 THOU/MM3 (ref 130–400)
PMV BLD AUTO: 9.3 FL (ref 9.4–12.4)
POTASSIUM SERPL-SCNC: 4.6 MEQ/L (ref 3.5–5.2)
RBC # BLD: 4.35 MILL/MM3 (ref 4.7–6.1)
SEG NEUTROPHILS: 72.3 %
SEGMENTED NEUTROPHILS ABSOLUTE COUNT: 5.3 THOU/MM3 (ref 1.8–7.7)
SODIUM BLD-SCNC: 143 MEQ/L (ref 135–145)
WBC # BLD: 7.3 THOU/MM3 (ref 4.8–10.8)

## 2018-09-11 PROCEDURE — 84460 ALANINE AMINO (ALT) (SGPT): CPT

## 2018-09-11 PROCEDURE — 80048 BASIC METABOLIC PNL TOTAL CA: CPT

## 2018-09-11 PROCEDURE — 85025 COMPLETE CBC W/AUTO DIFF WBC: CPT

## 2018-09-11 PROCEDURE — 36415 COLL VENOUS BLD VENIPUNCTURE: CPT

## 2018-09-11 PROCEDURE — 83036 HEMOGLOBIN GLYCOSYLATED A1C: CPT

## 2018-09-12 ENCOUNTER — HOSPITAL ENCOUNTER (OUTPATIENT)
Dept: PHARMACY | Age: 78
Setting detail: THERAPIES SERIES
Discharge: HOME | End: 2018-09-12
Payer: MEDICARE

## 2018-09-12 DIAGNOSIS — I48.19 PERSISTENT ATRIAL FIBRILLATION (HCC): ICD-10-CM

## 2018-09-12 DIAGNOSIS — I27.82 OTHER CHRONIC PULMONARY EMBOLISM WITHOUT ACUTE COR PULMONALE (HCC): ICD-10-CM

## 2018-09-12 LAB — POC INR: 2.2 (ref 0.8–1.2)

## 2018-09-12 PROCEDURE — 99211 OFF/OP EST MAY X REQ PHY/QHP: CPT

## 2018-09-12 PROCEDURE — 36416 COLLJ CAPILLARY BLOOD SPEC: CPT

## 2018-09-12 PROCEDURE — 85610 PROTHROMBIN TIME: CPT

## 2018-09-12 NOTE — PROGRESS NOTES
Medication Management [facility]  Anticoagulation Clinic  [phone] (phone)  [phone] (fax)      Mr. Mary Hdez. is a 70 y.o.  male with history of recurrent PE/persistent atrial fib. , per Dr. Nitin Sal referral, who presents today for Warfarin monitoring and adjustment (3 week visit after decreasing dose). Patient verifies current tablet strength. Followed printed instructions for dose. [facility] has large supply. No missed or extra doses. Patient denies bleeding/bruising/swelling/chest pain. Has usual GARCÍA. No blood in urine or stool. No dietary changes. No changes in medication/OTC agents/herbals. No change in alcohol use or tobacco use. Change in activity level: started bowling 8/27. Patient denies headaches/dizziness/lightheadedness/falls. No vomiting/diarrhea or acute illness. No procedures scheduled in the future at this time. States is due for colonoscopy - will call this clinic if scheduled. Assessment:   Lab Results   Component Value Date    INR 2.20 (H) 09/12/2018    INR 3.20 (H) 08/22/2018    INR 3.20 (H) 07/25/2018    PROTIME 35.8 (H) 12/18/2017    PROTIME 21.7 (A) 08/14/2017    PROTIME 52.5 (H) 08/12/2017     INR therapeutic - goal 2-3. Recent Labs      09/12/18   1506   INR  2.20*         Plan:  POCT INR ordered/performed/result reviewed. Continue PO Coumadin 7.5 mg MWF, 5 mg TThSS. Recheck INR in 4 weeks. Patient reminded to call the Anticoagulation Clinic with any signs or symptoms of bleeding or with any medication changes. Patient given instructions utilizing the teach back method. Discharged ambulatory in no apparent distress, with cane and wife. After visit summary printed and reviewed with patient.       Medications reviewed and updated on home medication list.     Influenza vaccine:     [] given    [x] declined   [x] received previously   [] plans to receive at a later time   [] refused    [x] documented in EPIC

## 2018-10-01 ENCOUNTER — CARE COORDINATION (OUTPATIENT)
Dept: CARE COORDINATION | Age: 78
End: 2018-10-01

## 2018-10-10 ENCOUNTER — HOSPITAL ENCOUNTER (OUTPATIENT)
Dept: PHARMACY | Age: 78
Setting detail: THERAPIES SERIES
Discharge: HOME | End: 2018-10-10
Payer: MEDICARE

## 2018-10-10 DIAGNOSIS — I27.82 OTHER CHRONIC PULMONARY EMBOLISM WITHOUT ACUTE COR PULMONALE (HCC): ICD-10-CM

## 2018-10-10 DIAGNOSIS — I48.19 PERSISTENT ATRIAL FIBRILLATION (HCC): ICD-10-CM

## 2018-10-10 LAB — POC INR: 2 (ref 0.8–1.2)

## 2018-10-10 PROCEDURE — 85610 PROTHROMBIN TIME: CPT

## 2018-10-10 PROCEDURE — 36416 COLLJ CAPILLARY BLOOD SPEC: CPT

## 2018-10-10 PROCEDURE — 99211 OFF/OP EST MAY X REQ PHY/QHP: CPT

## 2018-10-11 ENCOUNTER — OFFICE VISIT (OUTPATIENT)
Dept: INTERNAL MEDICINE CLINIC | Age: 78
End: 2018-10-11
Payer: MEDICARE

## 2018-10-11 VITALS
SYSTOLIC BLOOD PRESSURE: 116 MMHG | HEIGHT: 67 IN | HEART RATE: 68 BPM | DIASTOLIC BLOOD PRESSURE: 64 MMHG | WEIGHT: 292 LBS | BODY MASS INDEX: 45.83 KG/M2

## 2018-10-11 DIAGNOSIS — E78.00 PURE HYPERCHOLESTEROLEMIA: ICD-10-CM

## 2018-10-11 DIAGNOSIS — N40.0 BENIGN NON-NODULAR PROSTATIC HYPERPLASIA WITHOUT LOWER URINARY TRACT SYMPTOMS: ICD-10-CM

## 2018-10-11 DIAGNOSIS — Z23 NEED FOR PROPHYLACTIC VACCINATION AND INOCULATION AGAINST INFLUENZA: ICD-10-CM

## 2018-10-11 DIAGNOSIS — F33.41 RECURRENT MAJOR DEPRESSIVE DISORDER, IN PARTIAL REMISSION (HCC): ICD-10-CM

## 2018-10-11 DIAGNOSIS — E11.9 TYPE 2 DIABETES MELLITUS WITHOUT COMPLICATION, WITHOUT LONG-TERM CURRENT USE OF INSULIN (HCC): Primary | ICD-10-CM

## 2018-10-11 DIAGNOSIS — I10 ESSENTIAL HYPERTENSION: ICD-10-CM

## 2018-10-11 DIAGNOSIS — E66.01 MORBID OBESITY (HCC): ICD-10-CM

## 2018-10-11 PROCEDURE — 4040F PNEUMOC VAC/ADMIN/RCVD: CPT | Performed by: INTERNAL MEDICINE

## 2018-10-11 PROCEDURE — 1036F TOBACCO NON-USER: CPT | Performed by: INTERNAL MEDICINE

## 2018-10-11 PROCEDURE — G8427 DOCREV CUR MEDS BY ELIG CLIN: HCPCS | Performed by: INTERNAL MEDICINE

## 2018-10-11 PROCEDURE — 99214 OFFICE O/P EST MOD 30 MIN: CPT | Performed by: INTERNAL MEDICINE

## 2018-10-11 PROCEDURE — G8482 FLU IMMUNIZE ORDER/ADMIN: HCPCS | Performed by: INTERNAL MEDICINE

## 2018-10-11 PROCEDURE — G8417 CALC BMI ABV UP PARAM F/U: HCPCS | Performed by: INTERNAL MEDICINE

## 2018-10-11 PROCEDURE — 1101F PT FALLS ASSESS-DOCD LE1/YR: CPT | Performed by: INTERNAL MEDICINE

## 2018-10-11 PROCEDURE — 1123F ACP DISCUSS/DSCN MKR DOCD: CPT | Performed by: INTERNAL MEDICINE

## 2018-10-11 RX ORDER — METFORMIN HYDROCHLORIDE 500 MG/1
1000 TABLET, EXTENDED RELEASE ORAL
Qty: 180 TABLET | Refills: 1 | Status: SHIPPED | OUTPATIENT
Start: 2018-10-11 | End: 2019-04-11 | Stop reason: SDUPTHER

## 2018-10-11 RX ORDER — DUTASTERIDE 0.5 MG/1
CAPSULE, LIQUID FILLED ORAL
Qty: 90 CAPSULE | Refills: 1 | Status: SHIPPED | OUTPATIENT
Start: 2018-10-11 | End: 2019-04-11 | Stop reason: SDUPTHER

## 2018-10-11 NOTE — PROGRESS NOTES
Chief Complaint   Patient presents with    Hypertension    Hyperlipidemia    Diabetes    Benign Prostatic Hypertrophy       This patient presents for medical evaluation. I last saw the patient 4 months ago. Hypertension - BP controlled today. He was forgeting to take night pills - metoprolol and Vytorin. Changed to metoprolol succinate in am and Vytorin in am.  He will continue Coumadin and metformin at supper. On Ramipril, and Metoprolol - unable to start Norvasc due to dose of Simvastatin. No headache or visual changes. DM - He was diagnosed with DM in 2012 but just used diet control until 2/2016 when HgA1c 7.8 - started metformin 500 mg BID - main complaint is gas. Offered metformin XR but wanted to stay with current pill. Last eye exam 12/14/17 Dr. Nicole Meade - no diabetic retinopathy -note on chart. He denies foot lesions. On ASA, Vytorin, and Ramipril. Denies autonomic dysfunction, neuropathy, no nephropathy - microalbumin/Cr 32 slightly elevated 3/2018. BMP normal 9/2018. HgA1c 7.0 3/2018, 9/2018. He was complaining of diarrhea with metformin -  switched to ER. No issues with diarrhea on extended release. Hyperlipidemia - change Vytorin to am dosing to help him remember it better. LDL 40 2/2017 - at goal, normal ALT 10/2017. No new muscle aches on statin. Continue Vytorin. LDL 37, HDL 60, at goal 3/2018, ALT 3/2018, 9/2018. Water over load - took an extra pill and improved. Was at a time with the flu. He is drinking Gatoraid which may worsen situation. Reminded him to stop Gatoraid. No recent issues with fluid overload. Depression - stable, continue Paxil. Issues with anger with family - stays away from them. He refused counseling but will see his preacher if feels he needs it. Wife helps him work through it. Wife thinks he has been doing well recently - traveling more. No more epistaxis.     Morbid obesity - He is watching what he eats - he states he doesn't want (See Comments)     myalgia    Pcn [Penicillins] Rash     Social History     Social History    Marital status:      Spouse name: N/A    Number of children: N/A    Years of education: N/A     Occupational History    Not on file. Social History Main Topics    Smoking status: Former Smoker     Packs/day: 1.50     Years: 15.00     Types: Cigarettes     Quit date: 11/1/1984    Smokeless tobacco: Former User     Types: Snuff, Chew    Alcohol use No    Drug use: No    Sexual activity: Not on file     Other Topics Concern    Not on file     Social History Narrative    No narrative on file     Family History   Problem Relation Age of Onset    Cancer Mother         esophagus    Cancer Father         colon    Cancer Brother      Review of Systems - General ROS: negative for - chills or fever  Psychological ROS: positive for - anxiety or depression, controlled on meds, no SI/HI  Hematological and Lymphatic ROS: No history of blood clots or bleeding disorder. Respiratory ROS: no cough, shortness of breath, or wheezing  Cardiovascular ROS: no chest pain, positive chronic dyspnea on exertion, morbidly obese - better with weight loss  Gastrointestinal ROS: no abdominal pain, change in bowel habits, or black or bloody stools  Genito-Urinary ROS: no dysuria, trouble voiding, or hematuria  Musculoskeletal ROS: negative for - muscle pain or muscular weakness, positive back and knee pain - chronic  Neurological ROS: negative for - confusion, dizziness, headaches, numbness/tingling, visual changes or weakness  Dermatological ROS: negative for - rash or skin lesion changes    Blood pressure 116/64, pulse 68, height 5' 7\" (1.702 m), weight 292 lb (132.5 kg).    Physical Examination: General appearance - alert, well appearing, and in no distress  Mental status - alert, oriented to person, place, and time  Neck - supple, no significant adenopathy, no JVD, or carotid bruits  Chest - clear to auscultation, no wheezes, Status:   Future     Standing Expiration Date:   10/11/2019    Microalbumin / Creatinine Urine Ratio     Standing Status:   Future     Standing Expiration Date:   10/11/2019     DIABETES FOOT EXAM    AK ADMIN INFLUENZA VIRUS VAC     DM- HgA1c at goal. Eye exam done 12/2017 - reminded him to do eye exam yearly. Doing well with change to metformin ER to deal with diarrhea issue. Foot exam done today. Hypertension - BP controlled, continue current medication as above. Hypercholesterolemia - at goal on statin - checked Lipid panel 3/2018, with ALT normal 9/2018. Morbid obesity - BMI 45.73 - see plan above. Tried to make specific goals. He is improving. Now trying to focus on walking more. 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 BPH - stable, continue Avodart. Depression - stable but will occasionally complain of depression - continue Paxil. Encouraged counseling but refused. Atrial fib - on Coumadin, CHADS 4. No bleeding or signs of CVA. Will schedule follow up appointment in 6 months follow-up chronic issues. Continue medications at current doses. Labs due in 6 months. Solitario Swanson MD    [facility] PROFESSIONAL SERVS  PHYSICIANS [facility]. [facility] [address]  [address]  [address]  Dept: [phone]  Dept Fax: [phone] : [phone]

## 2018-10-15 PROCEDURE — G0008 ADMIN INFLUENZA VIRUS VAC: HCPCS | Performed by: INTERNAL MEDICINE

## 2018-10-15 PROCEDURE — 90662 IIV NO PRSV INCREASED AG IM: CPT | Performed by: INTERNAL MEDICINE

## 2018-10-15 NOTE — PROGRESS NOTES
After obtaining consent, and per orders of Dr. Humberto Francois, injection of Fluzone HD given in Left deltoid by Paula Jefferson. Patient instructed to remain in clinic for 20 minutes afterwards, and to report any adverse reaction to me immediately.

## 2018-10-24 ENCOUNTER — CARE COORDINATION (OUTPATIENT)
Dept: CARE COORDINATION | Age: 78
End: 2018-10-24

## 2018-10-24 NOTE — CARE COORDINATION
[facility]    Patient: Sandee Dubin. Patient : 1940    PCP: Yvonne Sherman MD        Spoke with Sandee Dubin., introduced [facility] program. Patient is Refused .  Patient was not interested in the program

## 2018-11-08 ENCOUNTER — HOSPITAL ENCOUNTER (OUTPATIENT)
Dept: PHARMACY | Age: 78
Setting detail: THERAPIES SERIES
Discharge: HOME | End: 2018-11-08
Payer: MEDICARE

## 2018-11-08 DIAGNOSIS — I48.19 PERSISTENT ATRIAL FIBRILLATION (HCC): ICD-10-CM

## 2018-11-08 DIAGNOSIS — I27.82 OTHER CHRONIC PULMONARY EMBOLISM WITHOUT ACUTE COR PULMONALE (HCC): ICD-10-CM

## 2018-11-08 LAB — POC INR: 2.4 (ref 0.8–1.2)

## 2018-11-08 PROCEDURE — 99211 OFF/OP EST MAY X REQ PHY/QHP: CPT

## 2018-11-08 PROCEDURE — 36416 COLLJ CAPILLARY BLOOD SPEC: CPT

## 2018-11-08 PROCEDURE — 85610 PROTHROMBIN TIME: CPT

## 2018-12-04 ENCOUNTER — HOSPITAL ENCOUNTER (OUTPATIENT)
Dept: PHARMACY | Age: 78
Setting detail: THERAPIES SERIES
Discharge: HOME | End: 2018-12-04
Payer: MEDICARE

## 2018-12-04 DIAGNOSIS — I27.82 OTHER CHRONIC PULMONARY EMBOLISM WITHOUT ACUTE COR PULMONALE (HCC): ICD-10-CM

## 2018-12-04 DIAGNOSIS — I48.19 PERSISTENT ATRIAL FIBRILLATION (HCC): ICD-10-CM

## 2018-12-04 LAB — POC INR: 1.8 (ref 0.8–1.2)

## 2018-12-04 PROCEDURE — 85610 PROTHROMBIN TIME: CPT

## 2018-12-04 PROCEDURE — 36416 COLLJ CAPILLARY BLOOD SPEC: CPT

## 2018-12-04 PROCEDURE — 99211 OFF/OP EST MAY X REQ PHY/QHP: CPT

## 2018-12-04 NOTE — PROGRESS NOTES
Medication Management [facility]  Anticoagulation Clinic  [phone] (phone)  [phone] (fax)    Mr. Jennifer Bahena. is a 70 y.o.  male with history of PE, persistent Afib who presents today for anticoagulation monitoring and adjustment. Patient verifies current dosing regimen and tablet strength. No missed or extra doses. Patient denies s/s bleeding/bruising/swelling/SOB/chest pain. No blood in urine or stool. No dietary changes. No changes in medication/OTC agents/Herbals. No change in alcohol use or tobacco use. No change in activity level. Patient denies dizziness/lightheadedness/falls. Has had a headache off and on, not now. No vomiting/diarrhea or acute illness. No procedures scheduled in the future at this time. Assessment:   Lab Results   Component Value Date    INR 1.80 (H) 12/04/2018    INR 2.40 (H) 11/08/2018    INR 2.00 (H) 10/10/2018    PROTIME 35.8 (H) 12/18/2017    PROTIME 21.7 (A) 08/14/2017    PROTIME 52.5 (H) 08/12/2017     INR subtherapeutic   Recent Labs      12/04/18   1525   INR  1.80*     Plan: POCT INR ordered and result reviewed. Take coumadin 7.5 mg po today per policy, then continue Coumadin 7.5 mg po MWF, 5 mg po TTHSS. Recheck INR in 4 weeks. Patient reminded to call the Anticoagulation Clinic with any signs or symptoms of bleeding or with any medication changes. Patient given instructions utilizing the teach back method. Discharged ambulatory in no apparent distress with cane. After visit summary printed and reviewed with patient.       Medications reviewed and updated on home medication list Yes    Influenza vaccine:     [] given    [] declined   [x] received previously   [] plans to receive at a later time   [] refused    [x] documented in EPIC

## 2018-12-12 RX ORDER — FLUTICASONE PROPIONATE 50 MCG
SPRAY, SUSPENSION (ML) NASAL
Qty: 48 G | Refills: 1 | Status: SHIPPED | OUTPATIENT
Start: 2018-12-12

## 2018-12-23 DIAGNOSIS — E11.9 TYPE 2 DIABETES MELLITUS WITHOUT COMPLICATION, WITHOUT LONG-TERM CURRENT USE OF INSULIN (HCC): ICD-10-CM

## 2018-12-23 DIAGNOSIS — F33.41 RECURRENT MAJOR DEPRESSIVE DISORDER, IN PARTIAL REMISSION (HCC): ICD-10-CM

## 2018-12-23 DIAGNOSIS — I10 ESSENTIAL HYPERTENSION: ICD-10-CM

## 2018-12-26 RX ORDER — RAMIPRIL 10 MG
CAPSULE ORAL
Qty: 90 CAPSULE | Refills: 1 | Status: SHIPPED | OUTPATIENT
Start: 2018-12-26 | End: 2019-06-23 | Stop reason: SDUPTHER

## 2018-12-26 RX ORDER — PAROXETINE HYDROCHLORIDE 20 MG/1
TABLET, FILM COATED ORAL
Qty: 90 TABLET | Refills: 1 | Status: SHIPPED | OUTPATIENT
Start: 2018-12-26 | End: 2019-06-23 | Stop reason: SDUPTHER

## 2019-01-02 ENCOUNTER — HOSPITAL ENCOUNTER (OUTPATIENT)
Dept: PHARMACY | Age: 79
Setting detail: THERAPIES SERIES
Discharge: HOME | End: 2019-01-02
Payer: MEDICARE

## 2019-01-02 DIAGNOSIS — I48.19 PERSISTENT ATRIAL FIBRILLATION (HCC): ICD-10-CM

## 2019-01-02 DIAGNOSIS — I27.82 OTHER CHRONIC PULMONARY EMBOLISM WITHOUT ACUTE COR PULMONALE (HCC): ICD-10-CM

## 2019-01-02 LAB — POC INR: 1.9 (ref 0.8–1.2)

## 2019-01-02 PROCEDURE — 85610 PROTHROMBIN TIME: CPT

## 2019-01-02 PROCEDURE — 99211 OFF/OP EST MAY X REQ PHY/QHP: CPT

## 2019-01-02 PROCEDURE — 36416 COLLJ CAPILLARY BLOOD SPEC: CPT

## 2019-01-02 RX ORDER — SOY ISOFLAVONE 40 MG
2 TABLET ORAL 2 TIMES DAILY
COMMUNITY
End: 2020-02-05

## 2019-01-07 DIAGNOSIS — E78.00 PURE HYPERCHOLESTEROLEMIA: ICD-10-CM

## 2019-01-08 RX ORDER — EZETIMIBE AND SIMVASTATIN 10; 40 MG/1; MG/1
TABLET ORAL
Qty: 90 TABLET | Refills: 1 | Status: SHIPPED | OUTPATIENT
Start: 2019-01-08 | End: 2019-07-06 | Stop reason: SDUPTHER

## 2019-01-23 ENCOUNTER — HOSPITAL ENCOUNTER (OUTPATIENT)
Dept: PHARMACY | Age: 79
Setting detail: THERAPIES SERIES
Discharge: HOME | End: 2019-01-23
Payer: MEDICARE

## 2019-01-23 DIAGNOSIS — I48.19 PERSISTENT ATRIAL FIBRILLATION (HCC): ICD-10-CM

## 2019-01-23 DIAGNOSIS — I27.82 OTHER CHRONIC PULMONARY EMBOLISM WITHOUT ACUTE COR PULMONALE (HCC): ICD-10-CM

## 2019-01-23 LAB — POC INR: 2.2 (ref 0.8–1.2)

## 2019-01-23 PROCEDURE — 36416 COLLJ CAPILLARY BLOOD SPEC: CPT

## 2019-01-23 PROCEDURE — 85610 PROTHROMBIN TIME: CPT

## 2019-01-23 PROCEDURE — 99211 OFF/OP EST MAY X REQ PHY/QHP: CPT

## 2019-02-20 ENCOUNTER — HOSPITAL ENCOUNTER (OUTPATIENT)
Dept: PHARMACY | Age: 79
Setting detail: THERAPIES SERIES
Discharge: HOME | End: 2019-02-20
Payer: MEDICARE

## 2019-02-20 DIAGNOSIS — I48.19 PERSISTENT ATRIAL FIBRILLATION (HCC): ICD-10-CM

## 2019-02-20 DIAGNOSIS — I27.82 OTHER CHRONIC PULMONARY EMBOLISM WITHOUT ACUTE COR PULMONALE (HCC): ICD-10-CM

## 2019-02-20 LAB — POC INR: 2.2 (ref 0.8–1.2)

## 2019-02-20 PROCEDURE — 85610 PROTHROMBIN TIME: CPT

## 2019-02-20 PROCEDURE — 99211 OFF/OP EST MAY X REQ PHY/QHP: CPT

## 2019-02-20 PROCEDURE — 36416 COLLJ CAPILLARY BLOOD SPEC: CPT

## 2019-03-01 RX ORDER — BUMETANIDE 1 MG/1
TABLET ORAL
Qty: 90 TABLET | Refills: 1 | Status: SHIPPED | OUTPATIENT
Start: 2019-03-01 | End: 2019-08-26 | Stop reason: SDUPTHER

## 2019-03-01 RX ORDER — METOPROLOL SUCCINATE 100 MG/1
TABLET, EXTENDED RELEASE ORAL
Qty: 90 TABLET | Refills: 1 | Status: SHIPPED | OUTPATIENT
Start: 2019-03-01 | End: 2019-08-26 | Stop reason: SDUPTHER

## 2019-03-19 ENCOUNTER — HOSPITAL ENCOUNTER (OUTPATIENT)
Age: 79
Discharge: HOME | End: 2019-03-19
Payer: MEDICARE

## 2019-03-19 DIAGNOSIS — E11.9 TYPE 2 DIABETES MELLITUS WITHOUT COMPLICATION, WITHOUT LONG-TERM CURRENT USE OF INSULIN (HCC): ICD-10-CM

## 2019-03-19 DIAGNOSIS — E78.00 PURE HYPERCHOLESTEROLEMIA: ICD-10-CM

## 2019-03-19 LAB
ALT SERPL-CCNC: 13 U/L (ref 11–66)
ANION GAP SERPL CALCULATED.3IONS-SCNC: 11 MEQ/L (ref 8–16)
AVERAGE GLUCOSE: 177 MG/DL (ref 70–126)
BUN BLDV-MCNC: 20 MG/DL (ref 7–22)
CALCIUM SERPL-MCNC: 9 MG/DL (ref 8.5–10.5)
CHLORIDE BLD-SCNC: 104 MEQ/L (ref 98–111)
CHOLESTEROL, TOTAL: 124 MG/DL (ref 100–199)
CO2: 27 MEQ/L (ref 23–33)
CREAT SERPL-MCNC: 0.8 MG/DL (ref 0.4–1.2)
CREATININE, URINE: 139.3 MG/DL
GFR SERPL CREATININE-BSD FRML MDRD: > 90 ML/MIN/1.73M2
GLUCOSE BLD-MCNC: 172 MG/DL (ref 70–108)
HBA1C MFR BLD: 7.9 % (ref 4.4–6.4)
HDLC SERPL-MCNC: 53 MG/DL
LDL CHOLESTEROL CALCULATED: 48 MG/DL
MICROALBUMIN UR-MCNC: 2.38 MG/DL
MICROALBUMIN/CREAT UR-RTO: 17 MG/G (ref 0–30)
POTASSIUM SERPL-SCNC: 4.1 MEQ/L (ref 3.5–5.2)
SODIUM BLD-SCNC: 142 MEQ/L (ref 135–145)
TRIGL SERPL-MCNC: 115 MG/DL (ref 0–199)

## 2019-03-19 PROCEDURE — 36415 COLL VENOUS BLD VENIPUNCTURE: CPT

## 2019-03-19 PROCEDURE — 83036 HEMOGLOBIN GLYCOSYLATED A1C: CPT

## 2019-03-19 PROCEDURE — 82043 UR ALBUMIN QUANTITATIVE: CPT

## 2019-03-19 PROCEDURE — 84460 ALANINE AMINO (ALT) (SGPT): CPT

## 2019-03-19 PROCEDURE — 80048 BASIC METABOLIC PNL TOTAL CA: CPT

## 2019-03-19 PROCEDURE — 80061 LIPID PANEL: CPT

## 2019-03-20 ENCOUNTER — HOSPITAL ENCOUNTER (OUTPATIENT)
Dept: PHARMACY | Age: 79
Setting detail: THERAPIES SERIES
Discharge: HOME | End: 2019-03-20
Payer: MEDICARE

## 2019-03-20 DIAGNOSIS — I48.19 PERSISTENT ATRIAL FIBRILLATION (HCC): ICD-10-CM

## 2019-03-20 DIAGNOSIS — I27.82 OTHER CHRONIC PULMONARY EMBOLISM WITHOUT ACUTE COR PULMONALE (HCC): ICD-10-CM

## 2019-03-20 LAB — POC INR: 2 (ref 0.8–1.2)

## 2019-03-20 PROCEDURE — 36416 COLLJ CAPILLARY BLOOD SPEC: CPT

## 2019-03-20 PROCEDURE — 99211 OFF/OP EST MAY X REQ PHY/QHP: CPT

## 2019-03-20 PROCEDURE — 85610 PROTHROMBIN TIME: CPT

## 2019-04-11 ENCOUNTER — OFFICE VISIT (OUTPATIENT)
Dept: INTERNAL MEDICINE CLINIC | Age: 79
End: 2019-04-11
Payer: MEDICARE

## 2019-04-11 VITALS
WEIGHT: 297 LBS | OXYGEN SATURATION: 98 % | SYSTOLIC BLOOD PRESSURE: 120 MMHG | DIASTOLIC BLOOD PRESSURE: 66 MMHG | HEIGHT: 67 IN | HEART RATE: 68 BPM | BODY MASS INDEX: 46.62 KG/M2

## 2019-04-11 DIAGNOSIS — E66.01 MORBID OBESITY WITH BMI OF 45.0-49.9, ADULT (HCC): ICD-10-CM

## 2019-04-11 DIAGNOSIS — I10 ESSENTIAL HYPERTENSION: ICD-10-CM

## 2019-04-11 DIAGNOSIS — E78.00 PURE HYPERCHOLESTEROLEMIA: ICD-10-CM

## 2019-04-11 DIAGNOSIS — N40.0 BENIGN NON-NODULAR PROSTATIC HYPERPLASIA WITHOUT LOWER URINARY TRACT SYMPTOMS: ICD-10-CM

## 2019-04-11 DIAGNOSIS — E11.9 TYPE 2 DIABETES MELLITUS WITHOUT COMPLICATION, WITHOUT LONG-TERM CURRENT USE OF INSULIN (HCC): Primary | ICD-10-CM

## 2019-04-11 PROCEDURE — 4040F PNEUMOC VAC/ADMIN/RCVD: CPT | Performed by: INTERNAL MEDICINE

## 2019-04-11 PROCEDURE — G8417 CALC BMI ABV UP PARAM F/U: HCPCS | Performed by: INTERNAL MEDICINE

## 2019-04-11 PROCEDURE — 93000 ELECTROCARDIOGRAM COMPLETE: CPT | Performed by: INTERNAL MEDICINE

## 2019-04-11 PROCEDURE — G8427 DOCREV CUR MEDS BY ELIG CLIN: HCPCS | Performed by: INTERNAL MEDICINE

## 2019-04-11 PROCEDURE — 1123F ACP DISCUSS/DSCN MKR DOCD: CPT | Performed by: INTERNAL MEDICINE

## 2019-04-11 PROCEDURE — 99214 OFFICE O/P EST MOD 30 MIN: CPT | Performed by: INTERNAL MEDICINE

## 2019-04-11 PROCEDURE — 1036F TOBACCO NON-USER: CPT | Performed by: INTERNAL MEDICINE

## 2019-04-11 RX ORDER — DUTASTERIDE 0.5 MG/1
CAPSULE, LIQUID FILLED ORAL
Qty: 90 CAPSULE | Refills: 1 | Status: SHIPPED | OUTPATIENT
Start: 2019-04-11 | End: 2019-10-08 | Stop reason: SDUPTHER

## 2019-04-11 RX ORDER — METFORMIN HYDROCHLORIDE 500 MG/1
1000 TABLET, EXTENDED RELEASE ORAL
Qty: 180 TABLET | Refills: 1 | Status: SHIPPED | OUTPATIENT
Start: 2019-04-11 | End: 2019-07-30 | Stop reason: SDUPTHER

## 2019-04-11 NOTE — PROGRESS NOTES
Chief Complaint   Patient presents with    Hypertension     6 months / labs completed    Hyperlipidemia    Diabetes    Benign Prostatic Hypertrophy    Atrial Fibrillation       This patient presents for medical evaluation. I last saw the patient 6 months ago. Hypertension - BP controlled today. He was forgeting to take night pills - metoprolol and Vytorin. Changed to metoprolol succinate in am and Vytorin in am.  He will continue Coumadin and metformin at supper. On Ramipril, and Metoprolol - unable to start Norvasc due to dose of Simvastatin. No headache or visual changes. DM - He was diagnosed with DM in 2012 but just used diet control until 2/2016 when HgA1c 7.8 - started metformin 500 mg BID - main complaint is gas. Offered metformin XR but wanted to stay with current pill. Last eye exam 12/2018 Dr. Katina Smith - no diabetic retinopathy -note on chart. He denies foot lesions. On ASA, Vytorin, and Ramipril. Denies autonomic dysfunction, neuropathy, no nephropathy - microalbumin/Cr 32 slightly elevated 3/2018. BMP normal 9/2018. HgA1c 7.0 3/2018, 9/2018. He was complaining of diarrhea with metformin -  switched to ER. No issues with diarrhea on extended release. He states he can tolerate if taking BID but had diarrhea if 1000 mg at one time - as HgA1c is 7.9 3/2019 - unable to increase Metformin. (Diet has been poor and agrees to cut out the sugar). Will give him 3 months to do diet and if not better - consider Jardiance (but none in this category are covered under his insurance). Januvia is covered. Microalbumin normal 3/2019. Hyperlipidemia - change Vytorin to am dosing to help him remember it better. LDL 40 2/2017 - at goal, normal ALT 10/2017. No new muscle aches on statin. Continue Vytorin. LDL 48, HDL 53, at goal 3/2019, ALT 3/2018, 9/2018, 3/2019. Water over load - took an extra pill and improved. Was at a time with the flu.   He is drinking Gatoraid which may worsen situation. Reminded him to stop Gatoraid. No recent issues with fluid overload. Depression - stable, continue Paxil. Issues with anger with family - stays away from them. He refused counseling but will see his preacher if feels he needs it. Wife helps him work through it. Wife thinks he has been doing well recently - traveling more. No more epistaxis. Morbid obesity - He is watching what he eats - he states he doesn't want to be on more medication for diabetes. Today she states he is being good with keeping sweets in moderation. Again encouraged him to be more active - walk around the store with wife instead of sitting while she shops. Joint pain is still a major issue. Offered to send to ortho for steroid injections but he refuses. Will try to lose weight - work on portion size. Wife states that now that she can't see - he is trying to get more junk food in grocery. But she is trying to encourage him to eat better. BMI today 45.73->45.52. Hypernatremia - 146 10/2017, not drinking enough water - better now with water intake. Na 143 3/2018 and 9/2018, 142 3/2019. HM - he got a reminder from [facility] to do colonoscopy (7 year followup - history of polyps and none on last one). Encouraged him to get this scheduled. He refused to let me help him set it up. He is now over age 78 and doesn't want to follow through with colonoscopy. GERD - He uses Zantac Qpm - may increase to twice a day. He states he didn't tolerate the PPI in the past.  Zantac not as effective, increased gas in am.  He wants to try Nexium OTC - suggest it was a good idea. If not helpful try Gas-x. He didn't try Gas-X or Nexium since last visit. Reminded him of these options. Today he states he didn't start the Nexium, and felling better with weight loss - only taking Zantac occasionally. BPH - He states he wants me to fill Avodart due to sees me more frequently than Dr. Jayna Garcia.   He had gross hematuria several months ago - Dr. Yesy Haynes switched finesteride to Avodart and discussed considering IVC filter and stopping Coumadin if Avodart didn't help. We are using Coumadin for A. Fib really now, not the PE. He was diagnosed with A. Fib at time of PE, they wanted to do cardioversion at [facility] - but he refused till he talked to me first.  He was sent to Dr. Nina Bermudez for follow up. I will be managing his Coumadin with [facility] coumadin clinic. Dr. Nina Bermudez decided not to do CV (due to his weight) per patient's report. He states he is no longer seeing Dr. Nina Bermudez. I will continue his Metoprolol and Coumadin. Discussed Eliquis in past, but he wanted to stick with Coumadin. He is still in Atrial fibrillation with a CHADS score of 4 (hypertension, DM and history of PE) - so I would favor continuing Coumadin as long as he is no longer bleeding. No issues since last visit with bleeding or CVA symptoms. He had issues with hematuria/passing blood clots. Seen in [facility] ER 5/30/15 - no UTI by culture but large amount of blood, INR at the time was 2.4 with a normal Hg, kidney function. Saw Dr. Yesy Haynes and work-up continuing. Cystoscopy without bladder lesion. CT done 6/18/15 with non-obstructing stones and possible bladder diverticulum, to see Dr. Yesy Haynes Tuesday to discuss. He was told hematuria was due to BPH and no hematuria since starting Proscar. He stopped Coumadin for 8 days till saw Dr. Yesy Haynes, then held a couple days. He states he has been back on Coumadin a week and no hematuria. Continues to be resolved. Saw Dr. Yesy Haynes 9/2015 with good report. 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 [facility] sent him by helicopter to [facility]. He had directed thrombolytics given. He states that the venous doppler of bilateral legs were negative. He was placed on Coumadin and sent to [address] [place] [address] - I will manage with them. INR is therapeutic. He has noticed right leg edema - stable and slowly getting better. He is sleeping in recliner. Suggested he needs to elevate above the level of his heart and wear compression hose at all times - may take off at night if legs are elevated. He states edema has improved since last visit. He was no longer wearing compression hose. Suggested getting back into it to prevent edema from coming back. OA - Recommend no Ibuprofen now that he is on Coumadin. He is using Tylenol prn instead.     Past Medical History:   Diagnosis Date    Allergic rhinitis     Atrial fibrillation ([address] 2/2014    Depression     Diabetes mellitus type II 1/2012    diagnosed with HgA1c 6.6    Erectile dysfunction     Hyperlipidemia     Hypertension     Lower extremity edema     LV dysfunction     h/o, normal now    Morbid obesity ([address]     Osteoarthritis     left knee, right foot, back    PE (pulmonary embolism) 2/2014     Past Surgical History:   Procedure Laterality Date    CHOLECYSTECTOMY      PULMONARY EMBOLISM SURGERY Bilateral 2/8/2014     Current Outpatient Medications on File Prior to Visit   Medication Sig Dispense Refill    Multiple Vitamins-Minerals (PRESERVISION AREDS 2 PO) Take by mouth daily      metoprolol succinate (TOPROL XL) 100 MG extended release tablet TAKE 1 TABLET DAILY 90 tablet 1    bumetanide (BUMEX) 1 MG tablet TAKE 1 TABLET DAILY AS NEEDED (FLUID OVERLOAD, WEIGHT GAIN, SHORTNESS OF BREATH) 90 tablet 1    ezetimibe-simvastatin (VYTORIN) 10-40 MG per tablet TAKE 1 TABLET NIGHTLY 90 tablet 1    Methylsulfonylmethane (MSM) 1000 MG CAPS Take 3 capsules by mouth 2 times daily      ALTACE 10 MG capsule TAKE 1 CAPSULE DAILY 90 capsule 1    PARoxetine (PAXIL) 20 MG tablet TAKE 1 TABLET EVERY MORNING 90 tablet 1    fluticasone (FLONASE) 50 MCG/ACT nasal spray USE 2 SPRAYS NASALLY DAILY 48 g 1    Psyllium (METAMUCIL PO) Take by mouth daily 1 teaspoonful       warfarin (COUMADIN) 5 MG tablet Take as directed by [facility].  (130 tabs = 90 day supply) 130 tablet 3    aspirin EC 81 MG EC tablet Take 81 mg by mouth daily. Blood thinner; with food.  OLIVE LEAF EXTRACT PO Take 2 tablets by mouth daily as needed supplement      ranitidine (ZANTAC) 150 MG tablet Take 150 mg by mouth as needed for Heartburn.  Cholecalciferol (VITAMIN D-3) 5000 UNIT TABS Take  by mouth daily. Indications: Treatment with Vitamin D       No current facility-administered medications on file prior to visit.         Allergies   Allergen Reactions    Lipitor Other (See Comments)     myalgia    Pcn [Penicillins] Rash     Social History     Socioeconomic History    Marital status:      Spouse name: Not on file    Number of children: Not on file    Years of education: Not on file    Highest education level: Not on file   Occupational History    Not on file   Social Needs    Financial resource strain: Not on file    Food insecurity:     Worry: Not on file     Inability: Not on file    Transportation needs:     Medical: Not on file     Non-medical: Not on file   Tobacco Use    Smoking status: Former Smoker     Packs/day: 1.50     Years: 15.00     Pack years: 22.50     Types: Cigarettes     Last attempt to quit: 1984     Years since quittin.4    Smokeless tobacco: Former User     Types: Snuff, Chew   Substance and Sexual Activity    Alcohol use: No    Drug use: No    Sexual activity: Not on file   Lifestyle    Physical activity:     Days per week: Not on file     Minutes per session: Not on file    Stress: Not on file   Relationships    Social connections:     Talks on phone: Not on file     Gets together: Not on file     Attends Worship service: Not on file     Active member of club or organization: Not on file     Attends meetings of clubs or organizations: Not on file     Relationship status: Not on file    Intimate partner violence:     Fear of current or ex partner: Not on file     Emotionally abused: Not on file     Physically abused: Not on file Forced sexual activity: Not on file   Other Topics Concern    Not on file   Social History Narrative    Not on file     Family History   Problem Relation Age of Onset    Cancer Mother         esophagus    Cancer Father         colon    Cancer Brother      Review of Systems - General ROS: negative for - chills or fever  Psychological ROS: positive for - anxiety or depression, controlled on meds, no SI/HI  Hematological and Lymphatic ROS: No history of blood clots or bleeding disorder. Respiratory ROS: no cough, shortness of breath, or wheezing  Cardiovascular ROS: no chest pain, positive chronic dyspnea on exertion, morbidly obese - better with weight loss  Gastrointestinal ROS: no abdominal pain, change in bowel habits, or black or bloody stools  Genito-Urinary ROS: no dysuria, trouble voiding, or hematuria  Musculoskeletal ROS: negative for - muscle pain or muscular weakness, positive back and knee pain - chronic  Neurological ROS: negative for - confusion, dizziness, headaches, numbness/tingling, visual changes or weakness  Dermatological ROS: negative for - rash or skin lesion changes    Blood pressure 120/66, pulse 68, height 5' 7\" (1.702 m), weight 297 lb (134.7 kg), SpO2 98 %.    Physical Examination: General appearance - alert, well appearing, and in no distress  Mental status - alert, oriented to person, place, and time  Neck - supple, no significant adenopathy, no JVD, or carotid bruits  Chest - clear to auscultation, no wheezes, rales or rhonchi, symmetric air entry  Heart - normal rate, irregular rhythm, no murmurs, rubs, clicks or gallops  Abdomen - soft, nontender, nondistended  Neurological - alert, oriented, normal speech, no focal findings or movement disorder noted  Extremities - peripheral pulses normal, no edema bilateral legs, no clubbing or cyanosis  Skin - normal coloration and turgor, warm, dry    Foot exam 10/11/18:  No lesions, sensation intact with monofilament testing.  +2 DP and PT [phone]

## 2019-04-16 DIAGNOSIS — I48.19 PERSISTENT ATRIAL FIBRILLATION (HCC): Primary | ICD-10-CM

## 2019-04-17 ENCOUNTER — HOSPITAL ENCOUNTER (OUTPATIENT)
Age: 79
Discharge: HOME | End: 2019-04-17
Payer: MEDICARE

## 2019-04-17 ENCOUNTER — HOSPITAL ENCOUNTER (OUTPATIENT)
Dept: PHARMACY | Age: 79
Setting detail: THERAPIES SERIES
Discharge: HOME | End: 2019-04-17
Payer: MEDICARE

## 2019-04-17 DIAGNOSIS — I27.82 OTHER CHRONIC PULMONARY EMBOLISM WITHOUT ACUTE COR PULMONALE (HCC): ICD-10-CM

## 2019-04-17 DIAGNOSIS — I48.19 PERSISTENT ATRIAL FIBRILLATION (HCC): ICD-10-CM

## 2019-04-17 LAB
HCT VFR BLD CALC: 45.8 % (ref 42–52)
HEMOGLOBIN: 14.9 GM/DL (ref 14–18)
POC INR: 2.3 (ref 0.8–1.2)

## 2019-04-17 PROCEDURE — 99211 OFF/OP EST MAY X REQ PHY/QHP: CPT

## 2019-04-17 PROCEDURE — 85018 HEMOGLOBIN: CPT

## 2019-04-17 PROCEDURE — 85610 PROTHROMBIN TIME: CPT

## 2019-04-17 PROCEDURE — 36416 COLLJ CAPILLARY BLOOD SPEC: CPT

## 2019-04-17 PROCEDURE — 36415 COLL VENOUS BLD VENIPUNCTURE: CPT

## 2019-04-17 PROCEDURE — 85014 HEMATOCRIT: CPT

## 2019-04-17 NOTE — PROGRESS NOTES
Medication Management [facility]  Anticoagulation Clinic  [phone] (phone)  [phone] (fax)      Mr. Ashley Fuentes is a 70 y.o.  male with history of persistent Afib, PE who presents today for anticoagulation monitoring and adjustment. Patient verifies current dosing regimen and tablet strength. No missed or extra doses. Patient denies s/s bleeding/bruising/swelling/SOB/chest pain. No blood in urine or stool. No dietary changes. No changes in medication/OTC agents/Herbals. No change in alcohol use or tobacco use. No change in activity level. Patient denies headaches/dizziness/lightheadedness/falls. No vomiting/diarrhea or acute illness. No procedures scheduled in the future at this time. States is on a diet, no sugar and decreased carbs. Assessment:   Lab Results   Component Value Date    INR 2.30 (H) 04/17/2019    INR 2.00 (H) 03/20/2019    INR 2.20 (H) 02/20/2019    PROTIME 35.8 (H) 12/18/2017    PROTIME 21.7 (A) 08/14/2017    PROTIME 52.5 (H) 08/12/2017     INR therapeutic   Recent Labs     04/17/19  1528   INR 2.30*     Plan: POCT INR ordered and result reviewed. Continue Coumadin 5 mg po MF, 7.5 mg po TWTHSS. Recheck INR in 4 weeks. Patient reminded to call the Anticoagulation Clinic with any signs or symptoms of bleeding or with any medication changes. Patient given instructions utilizing the teach back method. H&H order given. Discharged ambulatory in no apparent distress with wife. After visit summary printed and reviewed with patient.       Medications reviewed and updated on home medication list Yes    Influenza vaccine:     [] given    [] declined   [x] received previously   [] plans to receive at a later time   [] refused    [x] documented in EPIC

## 2019-05-15 ENCOUNTER — HOSPITAL ENCOUNTER (OUTPATIENT)
Dept: PHARMACY | Age: 79
Setting detail: THERAPIES SERIES
Discharge: HOME | End: 2019-05-15
Payer: MEDICARE

## 2019-05-15 DIAGNOSIS — I27.82 OTHER CHRONIC PULMONARY EMBOLISM WITHOUT ACUTE COR PULMONALE (HCC): ICD-10-CM

## 2019-05-15 DIAGNOSIS — I48.19 PERSISTENT ATRIAL FIBRILLATION (HCC): ICD-10-CM

## 2019-05-15 LAB — POC INR: 2.3 (ref 0.8–1.2)

## 2019-05-15 PROCEDURE — 36416 COLLJ CAPILLARY BLOOD SPEC: CPT

## 2019-05-15 PROCEDURE — 99211 OFF/OP EST MAY X REQ PHY/QHP: CPT

## 2019-05-15 PROCEDURE — 85610 PROTHROMBIN TIME: CPT

## 2019-05-15 NOTE — PROGRESS NOTES
Medication Management [facility]  Anticoagulation Clinic  [phone] (phone)  [phone] (fax)      Mr. Vedia Moritz. is a 70 y.o.  male with history of persistent Afib, PE who presents today for anticoagulation monitoring and adjustment. Patient verifies current dosing regimen and tablet strength. No missed or extra doses. Patient denies s/s bleeding/bruising/swelling/chest pain. Less SOB with weight loss. No blood in urine or stool. No dietary changes. No changes in medication/OTC agents/Herbals. Has been using [place] Dream Cream on his knees for arthritis pain relief for \"a long time. \"  No change in alcohol use or tobacco use. No change in activity level. Patient denies headaches/dizziness/lightheadedness/falls. No vomiting/diarrhea or acute illness. No procedures scheduled in the future at this time. Has lost 10 pounds since last appt 4 weeks ago. States was told by doctor he needed to lose weight or may need to start insulin. Assessment:   Lab Results   Component Value Date    INR 2.30 (H) 05/15/2019    INR 2.30 (H) 04/17/2019    INR 2.00 (H) 03/20/2019    PROTIME 35.8 (H) 12/18/2017    PROTIME 21.7 (A) 08/14/2017    PROTIME 52.5 (H) 08/12/2017     INR therapeutic   Recent Labs     05/15/19  1512   INR 2.30*       Plan: POCT INR ordered and result reviewed. Continue Coumadin 5 mg po MF, 7.5 mg po TWTHSS. Recheck INR in 5 weeks. Patient reminded to call the Anticoagulation Clinic with any signs or symptoms of bleeding or with any medication changes. Patient given instructions utilizing the teach back method. Discharged ambulatory in no apparent distress with cane and wife. After visit summary printed and reviewed with patient.       Medications reviewed and updated on home medication list Yes    Influenza vaccine:     [] given    [] declined   [x] received previously   [] plans to receive at a later time   [] refused    [x] documented in EPIC

## 2019-06-19 ENCOUNTER — HOSPITAL ENCOUNTER (OUTPATIENT)
Dept: PHARMACY | Age: 79
Setting detail: THERAPIES SERIES
Discharge: HOME | End: 2019-06-19
Payer: MEDICARE

## 2019-06-19 DIAGNOSIS — I48.19 PERSISTENT ATRIAL FIBRILLATION (HCC): ICD-10-CM

## 2019-06-19 DIAGNOSIS — I26.99 OTHER PULMONARY EMBOLISM WITHOUT ACUTE COR PULMONALE, UNSPECIFIED CHRONICITY (HCC): ICD-10-CM

## 2019-06-19 DIAGNOSIS — I27.82 OTHER CHRONIC PULMONARY EMBOLISM WITHOUT ACUTE COR PULMONALE (HCC): ICD-10-CM

## 2019-06-19 LAB — POC INR: 2.5 (ref 0.8–1.2)

## 2019-06-19 PROCEDURE — 99212 OFFICE O/P EST SF 10 MIN: CPT

## 2019-06-19 PROCEDURE — G0463 HOSPITAL OUTPT CLINIC VISIT: HCPCS

## 2019-06-19 PROCEDURE — 85610 PROTHROMBIN TIME: CPT

## 2019-06-19 PROCEDURE — 36416 COLLJ CAPILLARY BLOOD SPEC: CPT

## 2019-06-19 RX ORDER — WARFARIN SODIUM 5 MG/1
TABLET ORAL
Qty: 130 TABLET | Refills: 5 | Status: SHIPPED | OUTPATIENT
Start: 2019-06-19 | End: 2020-02-05 | Stop reason: SDUPTHER

## 2019-06-23 DIAGNOSIS — E11.9 TYPE 2 DIABETES MELLITUS WITHOUT COMPLICATION, WITHOUT LONG-TERM CURRENT USE OF INSULIN (HCC): ICD-10-CM

## 2019-06-23 DIAGNOSIS — I10 ESSENTIAL HYPERTENSION: ICD-10-CM

## 2019-06-23 DIAGNOSIS — F33.41 RECURRENT MAJOR DEPRESSIVE DISORDER, IN PARTIAL REMISSION (HCC): ICD-10-CM

## 2019-06-25 RX ORDER — RAMIPRIL 10 MG/1
CAPSULE ORAL
Qty: 90 CAPSULE | Refills: 1 | Status: SHIPPED | OUTPATIENT
Start: 2019-06-25 | End: 2020-07-21 | Stop reason: SDUPTHER

## 2019-06-25 RX ORDER — PAROXETINE HYDROCHLORIDE 20 MG/1
TABLET, FILM COATED ORAL
Qty: 90 TABLET | Refills: 1 | Status: SHIPPED | OUTPATIENT
Start: 2019-06-25 | End: 2019-10-22 | Stop reason: SDUPTHER

## 2019-07-06 DIAGNOSIS — E78.00 PURE HYPERCHOLESTEROLEMIA: ICD-10-CM

## 2019-07-08 RX ORDER — EZETIMIBE AND SIMVASTATIN 10; 40 MG/1; MG/1
TABLET ORAL
Qty: 90 TABLET | Refills: 0 | Status: SHIPPED | OUTPATIENT
Start: 2019-07-08 | End: 2019-10-06 | Stop reason: SDUPTHER

## 2019-07-24 ENCOUNTER — HOSPITAL ENCOUNTER (OUTPATIENT)
Dept: PHARMACY | Age: 79
Setting detail: THERAPIES SERIES
Discharge: HOME | End: 2019-07-24
Payer: MEDICARE

## 2019-07-24 LAB — POC INR: 2.2 (ref 0.8–1.2)

## 2019-07-24 PROCEDURE — 99211 OFF/OP EST MAY X REQ PHY/QHP: CPT

## 2019-07-24 PROCEDURE — 36416 COLLJ CAPILLARY BLOOD SPEC: CPT

## 2019-07-24 PROCEDURE — 85610 PROTHROMBIN TIME: CPT

## 2019-07-30 ENCOUNTER — OFFICE VISIT (OUTPATIENT)
Dept: INTERNAL MEDICINE CLINIC | Age: 79
End: 2019-07-30
Payer: MEDICARE

## 2019-07-30 VITALS
BODY MASS INDEX: 43.08 KG/M2 | DIASTOLIC BLOOD PRESSURE: 66 MMHG | HEART RATE: 68 BPM | HEIGHT: 67 IN | WEIGHT: 274.5 LBS | SYSTOLIC BLOOD PRESSURE: 120 MMHG

## 2019-07-30 DIAGNOSIS — E66.01 MORBID OBESITY WITH BMI OF 40.0-44.9, ADULT (HCC): Primary | ICD-10-CM

## 2019-07-30 DIAGNOSIS — E11.9 TYPE 2 DIABETES MELLITUS WITHOUT COMPLICATION, WITHOUT LONG-TERM CURRENT USE OF INSULIN (HCC): ICD-10-CM

## 2019-07-30 LAB — HBA1C MFR BLD: 6.6 % (ref 4.3–5.7)

## 2019-07-30 PROCEDURE — 1123F ACP DISCUSS/DSCN MKR DOCD: CPT | Performed by: INTERNAL MEDICINE

## 2019-07-30 PROCEDURE — 83036 HEMOGLOBIN GLYCOSYLATED A1C: CPT | Performed by: INTERNAL MEDICINE

## 2019-07-30 PROCEDURE — 99213 OFFICE O/P EST LOW 20 MIN: CPT | Performed by: INTERNAL MEDICINE

## 2019-07-30 PROCEDURE — G8427 DOCREV CUR MEDS BY ELIG CLIN: HCPCS | Performed by: INTERNAL MEDICINE

## 2019-07-30 PROCEDURE — 4040F PNEUMOC VAC/ADMIN/RCVD: CPT | Performed by: INTERNAL MEDICINE

## 2019-07-30 PROCEDURE — 1036F TOBACCO NON-USER: CPT | Performed by: INTERNAL MEDICINE

## 2019-07-30 PROCEDURE — G8417 CALC BMI ABV UP PARAM F/U: HCPCS | Performed by: INTERNAL MEDICINE

## 2019-07-30 RX ORDER — METFORMIN HYDROCHLORIDE 500 MG/1
1000 TABLET, EXTENDED RELEASE ORAL
Qty: 180 TABLET | Refills: 1 | Status: SHIPPED | OUTPATIENT
Start: 2019-07-30 | End: 2019-10-22 | Stop reason: SDUPTHER

## 2019-07-30 NOTE — PROGRESS NOTES
normal now    Morbid obesity ([facility] [address] [facility]     Osteoarthritis     left knee, right foot, back    PE (pulmonary embolism) 2/2014     Past Surgical History:   Procedure Laterality Date    CHOLECYSTECTOMY      PULMONARY EMBOLISM SURGERY Bilateral 2/8/2014     Current Outpatient Medications on File Prior to Visit   Medication Sig Dispense Refill    ezetimibe-simvastatin (VYTORIN) 10-40 MG per tablet TAKE 1 TABLET NIGHTLY 90 tablet 0    PARoxetine (PAXIL) 20 MG tablet TAKE 1 TABLET EVERY MORNING 90 tablet 1    ramipril (ALTACE) 10 MG capsule TAKE 1 CAPSULE DAILY 90 capsule 1    warfarin (COUMADIN) 5 MG tablet Take as directed by [facility].  (130 tabs = 90 day supply) 130 tablet 5    Histamine Dihydrochloride (AUSTRALIAN DREAM ARTHRITIS EX) Apply topically as needed Indications: Knee Pain      Multiple Vitamins-Minerals (PRESERVISION AREDS 2 PO) Take by mouth daily      dutasteride (AVODART) 0.5 MG capsule TAKE 1 CAPSULE DAILY 90 capsule 1    metoprolol succinate (TOPROL XL) 100 MG extended release tablet TAKE 1 TABLET DAILY 90 tablet 1    bumetanide (BUMEX) 1 MG tablet TAKE 1 TABLET DAILY AS NEEDED (FLUID OVERLOAD, WEIGHT GAIN, SHORTNESS OF BREATH) 90 tablet 1    Methylsulfonylmethane (MSM) 1000 MG CAPS Take 2 capsules by mouth 2 times daily Indications: Knee Pain       fluticasone (FLONASE) 50 MCG/ACT nasal spray USE 2 SPRAYS NASALLY DAILY 48 g 1    Psyllium (METAMUCIL PO) Take by mouth daily 1 teaspoonful       aspirin EC 81 MG EC tablet Take 81 mg by mouth daily. Blood thinner; with food.  OLIVE LEAF EXTRACT PO Take 2 tablets by mouth daily as needed supplement      Cholecalciferol (VITAMIN D-3) 5000 UNIT TABS Take  by mouth daily. Indications: Treatment with Vitamin D      ranitidine (ZANTAC) 150 MG tablet Take 150 mg by mouth as needed for Heartburn. No current facility-administered medications on file prior to visit.         Allergies   Allergen Reactions    Lipitor Other (See Comments)     myalgia    Pcn [Penicillins] Rash     Social History     Socioeconomic History    Marital status:      Spouse name: Not on file    Number of children: Not on file    Years of education: Not on file    Highest education level: Not on file   Occupational History    Not on file   Social Needs    Financial resource strain: Not on file    Food insecurity:     Worry: Not on file     Inability: Not on file    Transportation needs:     Medical: Not on file     Non-medical: Not on file   Tobacco Use    Smoking status: Former Smoker     Packs/day: 1.50     Years: 15.00     Pack years: 22.50     Types: Cigarettes     Last attempt to quit: 1984     Years since quittin.7    Smokeless tobacco: Former User     Types: Snuff, Chew   Substance and Sexual Activity    Alcohol use: No    Drug use: No    Sexual activity: Not on file   Lifestyle    Physical activity:     Days per week: Not on file     Minutes per session: Not on file    Stress: Not on file   Relationships    Social connections:     Talks on phone: Not on file     Gets together: Not on file     Attends Christian service: Not on file     Active member of club or organization: Not on file     Attends meetings of clubs or organizations: Not on file     Relationship status: Not on file    Intimate partner violence:     Fear of current or ex partner: Not on file     Emotionally abused: Not on file     Physically abused: Not on file     Forced sexual activity: Not on file   Other Topics Concern    Not on file   Social History Narrative    Not on file     Family History   Problem Relation Age of Onset    Cancer Mother         esophagus    Cancer Father         colon    Cancer Brother      Review of Systems - General ROS: negative for - chills or fever  Psychological ROS: positive for - anxiety or depression, controlled on meds, no SI/HI  Hematological and Lymphatic ROS: No history of blood clots or bleeding release tablet    Basic Metabolic Panel    CBC Auto Differential         Orders Placed This Encounter   Procedures    Basic Metabolic Panel     Standing Status:   Future     Standing Expiration Date:   7/29/2020    CBC Auto Differential     Standing Status:   Future     Standing Expiration Date:   7/29/2020    POCT glycosylated hemoglobin (Hb A1C)    03789 - Collection Capillary Blood Specimen     DM- HgA1c 7.9->6.6 with diet change and weight loss 297->274# in last 3 months. Eye exam done 12/2018 - reminded him to do eye exam yearly - no DM retinopathy. Doing well with change to metformin ER to deal with diarrhea issue. Morbid obesity - BMI 46.54 -> 42.99 see plan above. Tried to make specific goals. He is improving. Now trying to focus on walking more. 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 Will schedule follow up appointment in 3 months follow-up chronic issues. Continue medications at current doses. Nay Beltran MD    [facility] PROFESSIONAL SERVS  [facility]. [place] [address]  [address]  [address] [place] [address]  Dept: [phone]  Dept Fax: [phone] : [phone]

## 2019-08-28 ENCOUNTER — HOSPITAL ENCOUNTER (OUTPATIENT)
Dept: PHARMACY | Age: 79
Setting detail: THERAPIES SERIES
Discharge: HOME | End: 2019-08-28
Payer: MEDICARE

## 2019-08-28 DIAGNOSIS — I48.19 PERSISTENT ATRIAL FIBRILLATION (HCC): ICD-10-CM

## 2019-08-28 DIAGNOSIS — I26.99 OTHER ACUTE PULMONARY EMBOLISM WITHOUT ACUTE COR PULMONALE (HCC): ICD-10-CM

## 2019-08-28 LAB — POC INR: 2.4 (ref 0.8–1.2)

## 2019-08-28 PROCEDURE — 36416 COLLJ CAPILLARY BLOOD SPEC: CPT | Performed by: PHARMACIST

## 2019-08-28 PROCEDURE — 99211 OFF/OP EST MAY X REQ PHY/QHP: CPT | Performed by: PHARMACIST

## 2019-08-28 PROCEDURE — 85610 PROTHROMBIN TIME: CPT | Performed by: PHARMACIST

## 2019-09-17 ENCOUNTER — NURSE ONLY (OUTPATIENT)
Dept: LAB | Age: 79
End: 2019-09-17

## 2019-09-17 LAB
ANION GAP SERPL CALCULATED.3IONS-SCNC: 15 MEQ/L (ref 8–16)
BASOPHILS # BLD: 0.4 %
BASOPHILS ABSOLUTE: 0 THOU/MM3 (ref 0–0.1)
BUN BLDV-MCNC: 20 MG/DL (ref 7–22)
CALCIUM SERPL-MCNC: 9.2 MG/DL (ref 8.5–10.5)
CHLORIDE BLD-SCNC: 109 MEQ/L (ref 98–111)
CO2: 22 MEQ/L (ref 23–33)
CREAT SERPL-MCNC: 0.7 MG/DL (ref 0.4–1.2)
EOSINOPHIL # BLD: 2 %
EOSINOPHILS ABSOLUTE: 0.1 THOU/MM3 (ref 0–0.4)
ERYTHROCYTE [DISTWIDTH] IN BLOOD BY AUTOMATED COUNT: 14 % (ref 11.5–14.5)
ERYTHROCYTE [DISTWIDTH] IN BLOOD BY AUTOMATED COUNT: 54.3 FL (ref 35–45)
GFR SERPL CREATININE-BSD FRML MDRD: > 90 ML/MIN/1.73M2
GLUCOSE BLD-MCNC: 137 MG/DL (ref 70–108)
HCT VFR BLD CALC: 44.7 % (ref 42–52)
HEMOGLOBIN: 13.9 GM/DL (ref 14–18)
IMMATURE GRANS (ABS): 0.01 THOU/MM3 (ref 0–0.07)
IMMATURE GRANULOCYTES: 0 %
LYMPHOCYTES # BLD: 25.1 %
LYMPHOCYTES ABSOLUTE: 1.1 THOU/MM3 (ref 1–4.8)
MCH RBC QN AUTO: 32.7 PG (ref 26–33)
MCHC RBC AUTO-ENTMCNC: 31.1 GM/DL (ref 32.2–35.5)
MCV RBC AUTO: 105.2 FL (ref 80–94)
MONOCYTES # BLD: 18 %
MONOCYTES ABSOLUTE: 0.8 THOU/MM3 (ref 0.4–1.3)
NUCLEATED RED BLOOD CELLS: 0 /100 WBC
PLATELET # BLD: 184 THOU/MM3 (ref 130–400)
PMV BLD AUTO: 9.8 FL (ref 9.4–12.4)
POTASSIUM SERPL-SCNC: 4.2 MEQ/L (ref 3.5–5.2)
RBC # BLD: 4.25 MILL/MM3 (ref 4.7–6.1)
SEG NEUTROPHILS: 54.3 %
SEGMENTED NEUTROPHILS ABSOLUTE COUNT: 2.4 THOU/MM3 (ref 1.8–7.7)
SODIUM BLD-SCNC: 146 MEQ/L (ref 135–145)
WBC # BLD: 4.5 THOU/MM3 (ref 4.8–10.8)

## 2019-10-03 ENCOUNTER — HOSPITAL ENCOUNTER (OUTPATIENT)
Dept: PHARMACY | Age: 79
Setting detail: THERAPIES SERIES
Discharge: HOME | End: 2019-10-03
Payer: MEDICARE

## 2019-10-03 DIAGNOSIS — I26.99 OTHER ACUTE PULMONARY EMBOLISM WITHOUT ACUTE COR PULMONALE (HCC): ICD-10-CM

## 2019-10-03 DIAGNOSIS — I48.19 PERSISTENT ATRIAL FIBRILLATION (HCC): ICD-10-CM

## 2019-10-03 LAB — POC INR: 2.6 (ref 0.8–1.2)

## 2019-10-03 PROCEDURE — 99211 OFF/OP EST MAY X REQ PHY/QHP: CPT

## 2019-10-03 PROCEDURE — 85610 PROTHROMBIN TIME: CPT

## 2019-10-03 PROCEDURE — 36416 COLLJ CAPILLARY BLOOD SPEC: CPT

## 2019-10-06 DIAGNOSIS — E78.00 PURE HYPERCHOLESTEROLEMIA: ICD-10-CM

## 2019-10-07 RX ORDER — EZETIMIBE AND SIMVASTATIN 10; 40 MG/1; MG/1
TABLET ORAL
Qty: 90 TABLET | Refills: 4 | Status: SHIPPED | OUTPATIENT
Start: 2019-10-07 | Stop reason: SDUPTHER

## 2019-10-08 DIAGNOSIS — N40.0 BENIGN NON-NODULAR PROSTATIC HYPERPLASIA WITHOUT LOWER URINARY TRACT SYMPTOMS: ICD-10-CM

## 2019-10-08 RX ORDER — DUTASTERIDE 0.5 MG/1
CAPSULE, LIQUID FILLED ORAL
Qty: 90 CAPSULE | Refills: 4 | Status: SHIPPED | OUTPATIENT
Start: 2019-10-08 | Stop reason: SDUPTHER

## 2019-10-22 ENCOUNTER — OFFICE VISIT (OUTPATIENT)
Dept: INTERNAL MEDICINE CLINIC | Age: 79
End: 2019-10-22
Payer: MEDICARE

## 2019-10-22 VITALS
HEIGHT: 67 IN | WEIGHT: 274.8 LBS | DIASTOLIC BLOOD PRESSURE: 76 MMHG | SYSTOLIC BLOOD PRESSURE: 126 MMHG | HEART RATE: 68 BPM | BODY MASS INDEX: 43.13 KG/M2

## 2019-10-22 DIAGNOSIS — E66.01 MORBID OBESITY WITH BMI OF 40.0-44.9, ADULT (HCC): ICD-10-CM

## 2019-10-22 DIAGNOSIS — I10 ESSENTIAL HYPERTENSION: ICD-10-CM

## 2019-10-22 DIAGNOSIS — E11.9 TYPE 2 DIABETES MELLITUS WITHOUT COMPLICATION, WITHOUT LONG-TERM CURRENT USE OF INSULIN (HCC): Primary | ICD-10-CM

## 2019-10-22 DIAGNOSIS — E78.00 PURE HYPERCHOLESTEROLEMIA: ICD-10-CM

## 2019-10-22 DIAGNOSIS — F33.41 RECURRENT MAJOR DEPRESSIVE DISORDER, IN PARTIAL REMISSION (HCC): ICD-10-CM

## 2019-10-22 DIAGNOSIS — Z23 NEED FOR INFLUENZA VACCINATION: ICD-10-CM

## 2019-10-22 PROCEDURE — G0008 ADMIN INFLUENZA VIRUS VAC: HCPCS | Performed by: INTERNAL MEDICINE

## 2019-10-22 PROCEDURE — G8427 DOCREV CUR MEDS BY ELIG CLIN: HCPCS | Performed by: INTERNAL MEDICINE

## 2019-10-22 PROCEDURE — G8417 CALC BMI ABV UP PARAM F/U: HCPCS | Performed by: INTERNAL MEDICINE

## 2019-10-22 PROCEDURE — 99214 OFFICE O/P EST MOD 30 MIN: CPT | Performed by: INTERNAL MEDICINE

## 2019-10-22 PROCEDURE — 90653 IIV ADJUVANT VACCINE IM: CPT | Performed by: INTERNAL MEDICINE

## 2019-10-22 PROCEDURE — G8482 FLU IMMUNIZE ORDER/ADMIN: HCPCS | Performed by: INTERNAL MEDICINE

## 2019-10-22 PROCEDURE — 4040F PNEUMOC VAC/ADMIN/RCVD: CPT | Performed by: INTERNAL MEDICINE

## 2019-10-22 PROCEDURE — 1123F ACP DISCUSS/DSCN MKR DOCD: CPT | Performed by: INTERNAL MEDICINE

## 2019-10-22 PROCEDURE — 1036F TOBACCO NON-USER: CPT | Performed by: INTERNAL MEDICINE

## 2019-10-22 RX ORDER — PAROXETINE HYDROCHLORIDE 20 MG/1
TABLET, FILM COATED ORAL
Qty: 90 TABLET | Refills: 1 | Status: SHIPPED | OUTPATIENT
Start: 2019-10-22 | End: 2020-07-21 | Stop reason: SDUPTHER

## 2019-10-22 RX ORDER — METFORMIN HYDROCHLORIDE 500 MG/1
1000 TABLET, EXTENDED RELEASE ORAL
Qty: 180 TABLET | Refills: 1 | Status: SHIPPED | OUTPATIENT
Start: 2019-10-22 | End: 2020-04-29 | Stop reason: SDUPTHER

## 2019-11-06 ENCOUNTER — HOSPITAL ENCOUNTER (OUTPATIENT)
Dept: PHARMACY | Age: 79
Setting detail: THERAPIES SERIES
Discharge: HOME | End: 2019-11-06
Payer: MEDICARE

## 2019-11-06 DIAGNOSIS — I26.99 OTHER ACUTE PULMONARY EMBOLISM WITHOUT ACUTE COR PULMONALE (HCC): ICD-10-CM

## 2019-11-06 DIAGNOSIS — I48.19 PERSISTENT ATRIAL FIBRILLATION (HCC): ICD-10-CM

## 2019-11-06 LAB — POC INR: 1.8 (ref 0.8–1.2)

## 2019-11-06 PROCEDURE — 85610 PROTHROMBIN TIME: CPT

## 2019-11-06 PROCEDURE — 99211 OFF/OP EST MAY X REQ PHY/QHP: CPT

## 2019-11-06 PROCEDURE — 36416 COLLJ CAPILLARY BLOOD SPEC: CPT

## 2019-11-13 ENCOUNTER — TELEPHONE (OUTPATIENT)
Dept: INTERNAL MEDICINE CLINIC | Age: 79
End: 2019-11-13

## 2019-11-13 DIAGNOSIS — I48.19 PERSISTENT ATRIAL FIBRILLATION (HCC): Primary | ICD-10-CM

## 2019-11-26 ENCOUNTER — HOSPITAL ENCOUNTER (OUTPATIENT)
Dept: PHARMACY | Age: 79
Setting detail: THERAPIES SERIES
Discharge: HOME | End: 2019-11-26
Payer: MEDICARE

## 2019-11-26 DIAGNOSIS — I26.99 OTHER ACUTE PULMONARY EMBOLISM WITHOUT ACUTE COR PULMONALE (HCC): ICD-10-CM

## 2019-11-26 DIAGNOSIS — I48.19 PERSISTENT ATRIAL FIBRILLATION (HCC): ICD-10-CM

## 2019-11-26 LAB — POC INR: 2.4 (ref 0.8–1.2)

## 2019-11-26 PROCEDURE — 85610 PROTHROMBIN TIME: CPT

## 2019-11-26 PROCEDURE — 36416 COLLJ CAPILLARY BLOOD SPEC: CPT

## 2019-11-26 PROCEDURE — 99211 OFF/OP EST MAY X REQ PHY/QHP: CPT

## 2020-01-02 ENCOUNTER — HOSPITAL ENCOUNTER (OUTPATIENT)
Dept: PHARMACY | Age: 80
Setting detail: THERAPIES SERIES
Discharge: HOME | End: 2020-01-02
Payer: MEDICARE

## 2020-01-02 LAB — POC INR: 2.1 (ref 0.8–1.2)

## 2020-01-02 PROCEDURE — 36416 COLLJ CAPILLARY BLOOD SPEC: CPT

## 2020-01-02 PROCEDURE — 99211 OFF/OP EST MAY X REQ PHY/QHP: CPT

## 2020-01-02 PROCEDURE — 85610 PROTHROMBIN TIME: CPT

## 2020-01-02 NOTE — PROGRESS NOTES
Medication Management [facility]  Anticoagulation Clinic  [phone] (phone)  [phone] (fax)      Mr. Rosalia Rodriguez. is a 70 y.o.  male with history of PE, Afib who presents today for anticoagulation monitoring and adjustment. Patient verifies current dosing regimen and tablet strength. No missed or extra doses. Patient denies s/s bleeding/bruising/swelling/SOB/chest pain. Having arthritis pain in legs. Plans to see Dr. Camelia Wadsworth. No blood in urine or stool. No dietary changes. No changes in medication/OTC agents/Herbals. No change in alcohol use or tobacco use. No change in activity level. Patient denies headaches/dizziness/lightheadedness/falls. No vomiting/diarrhea or acute illness. No procedures scheduled in the future at this time. Assessment:   Lab Results   Component Value Date    INR 2.10 (H) 01/02/2020    INR 2.40 (H) 11/26/2019    INR 1.80 (H) 11/06/2019    PROTIME 35.8 (H) 12/18/2017    PROTIME 21.7 (A) 08/14/2017    PROTIME 52.5 (H) 08/12/2017     INR therapeutic   Recent Labs     01/02/20  1438   INR 2.10*     Plan: POCT INR ordered and result reviewed. Continue Coumadin 5 mg po W, 7.5 mg po [id number]. Recheck INR in 5 weeks. Patient reminded to call the Anticoagulation Clinic with any signs or symptoms of bleeding or with any medication changes. Patient given instructions utilizing the teach back method. Discharged ambulatory in no apparent distress with cane. After visit summary printed and reviewed with patient.       Medications reviewed and updated on home medication list Yes    Influenza vaccine:     [] given    [] declined   [x] received previously   [] plans to receive at a later time   [] refused    [x] documented in EPIC

## 2020-01-21 ENCOUNTER — OFFICE VISIT (OUTPATIENT)
Dept: INTERNAL MEDICINE CLINIC | Age: 80
End: 2020-01-21
Payer: MEDICARE

## 2020-01-21 VITALS
WEIGHT: 278.8 LBS | DIASTOLIC BLOOD PRESSURE: 80 MMHG | HEART RATE: 66 BPM | HEIGHT: 67 IN | SYSTOLIC BLOOD PRESSURE: 136 MMHG | BODY MASS INDEX: 43.76 KG/M2

## 2020-01-21 LAB — HBA1C MFR BLD: 6.8 %

## 2020-01-21 PROCEDURE — 1123F ACP DISCUSS/DSCN MKR DOCD: CPT | Performed by: INTERNAL MEDICINE

## 2020-01-21 PROCEDURE — G8417 CALC BMI ABV UP PARAM F/U: HCPCS | Performed by: INTERNAL MEDICINE

## 2020-01-21 PROCEDURE — 99213 OFFICE O/P EST LOW 20 MIN: CPT | Performed by: INTERNAL MEDICINE

## 2020-01-21 PROCEDURE — G8427 DOCREV CUR MEDS BY ELIG CLIN: HCPCS | Performed by: INTERNAL MEDICINE

## 2020-01-21 PROCEDURE — 1036F TOBACCO NON-USER: CPT | Performed by: INTERNAL MEDICINE

## 2020-01-21 PROCEDURE — 4040F PNEUMOC VAC/ADMIN/RCVD: CPT | Performed by: INTERNAL MEDICINE

## 2020-01-21 PROCEDURE — G8482 FLU IMMUNIZE ORDER/ADMIN: HCPCS | Performed by: INTERNAL MEDICINE

## 2020-01-21 PROCEDURE — 83036 HEMOGLOBIN GLYCOSYLATED A1C: CPT | Performed by: INTERNAL MEDICINE

## 2020-01-21 RX ORDER — METOPROLOL SUCCINATE 100 MG/1
TABLET, EXTENDED RELEASE ORAL
Qty: 90 TABLET | Refills: 1 | Status: SHIPPED | OUTPATIENT
Start: 2020-01-21 | End: 2020-07-21 | Stop reason: SDUPTHER

## 2020-01-21 RX ORDER — BUMETANIDE 1 MG/1
TABLET ORAL
Qty: 90 TABLET | Refills: 1 | Status: SHIPPED | OUTPATIENT
Start: 2020-01-21 | End: 2021-03-10

## 2020-01-21 NOTE — PROGRESS NOTES
Chief Complaint   Patient presents with    Diabetes     3 month follow up    Leg Pain     bilateral-describes as sharp in both legs-takes 6 tylenol daily    Obesity     This patient presents for medical evaluation. I last saw the patient 3 months ago. Address all issues today. DM - He was diagnosed with DM in 2012 but just used diet control until 2/2016 when HgA1c 7.8 - started metformin 500 mg BID - main complaint is gas. Changed metformin to XR and GI symptoms improved. HgA1c 7.9-> 6.6 7/2019 with diet change and weight loss 297->274# in last 3 months, now 6.8 with weight gain 4# over holidays. On ASA, Vytorin, and Ramipril. Eye exam done 11/2019 - reminded him to do eye exam yearly - no DM retinopathy - Dr. Fatemeh Wei. He denies foot lesions, neuropathy. Denies autonomic dysfunction. Microalbumin/Cr 32 slightly elevated 3/2018, normal 3/2019. Cr normal 9/2019. Morbid obesity - He is watching what he eats - he states he doesn't want to be on more medication for diabetes. Today she states he is being good with keeping sweets in moderation. Again encouraged him to be more active - walk around the store with wife instead of sitting while she shops. Joint pain is still a major issue. Offered to send to ortho for steroid injections but he refuses. Will try to lose weight - work on portion size. Wife states that now that she can't see - he is trying to get more junk food in grocery. But she is trying to encourage him to eat better. BMI today 45.73->45.52.->42.99 - lost 23# in 3 months with diet changes. Now BMI stable 43.04 - really encouraged him to get back on the diet and continue to lose weight. Now up 4# over holidays.     Past Medical History:   Diagnosis Date    Allergic rhinitis     Atrial fibrillation ([facility] [address] 2/2014    Depression     Diabetes mellitus type II 1/2012    diagnosed with HgA1c 6.6    Erectile dysfunction     Hyperlipidemia     Hypertension     Lower extremity edema     LV dysfunction     h/o, normal now    Morbid obesity (HCC)     Osteoarthritis     left knee, right foot, back    PE (pulmonary embolism) 2/2014     Past Surgical History:   Procedure Laterality Date    CHOLECYSTECTOMY      PULMONARY EMBOLISM SURGERY Bilateral 2/8/2014     Current Outpatient Medications on File Prior to Visit   Medication Sig Dispense Refill    metFORMIN (GLUCOPHAGE XR) 500 MG extended release tablet Take 2 tablets by mouth daily (with breakfast) 180 tablet 1    PARoxetine (PAXIL) 20 MG tablet TAKE 1 TABLET EVERY MORNING 90 tablet 1    dutasteride (AVODART) 0.5 MG capsule TAKE 1 CAPSULE DAILY 90 capsule 4    ezetimibe-simvastatin (VYTORIN) 10-40 MG per tablet TAKE 1 TABLET NIGHTLY 90 tablet 4    ramipril (ALTACE) 10 MG capsule TAKE 1 CAPSULE DAILY 90 capsule 1    warfarin (COUMADIN) 5 MG tablet Take as directed by [facility].  (130 tabs = 90 day supply) 130 tablet 5    Multiple Vitamins-Minerals (PRESERVISION AREDS 2 PO) Take by mouth daily      Methylsulfonylmethane (MSM) 1000 MG CAPS Take 2 capsules by mouth 2 times daily Indications: Knee Pain       fluticasone (FLONASE) 50 MCG/ACT nasal spray USE 2 SPRAYS NASALLY DAILY 48 g 1    aspirin EC 81 MG EC tablet Take 81 mg by mouth daily. Blood thinner; with food.  OLIVE LEAF EXTRACT PO Take 2 tablets by mouth daily as needed supplement      Cholecalciferol (VITAMIN D-3) 5000 UNIT TABS Take  by mouth daily. Indications: Treatment with Vitamin D       No current facility-administered medications on file prior to visit.         Allergies   Allergen Reactions    Lipitor Other (See Comments)     myalgia    Pcn [Penicillins] Rash     Social History     Socioeconomic History    Marital status:      Spouse name: Not on file    Number of children: Not on file    Years of education: Not on file    Highest education level: Not on file   Occupational History    Not on file   Social Needs    weakness, positive back and knee pain - chronic  Dermatological ROS: negative for - rash or skin lesion changes    Blood pressure 136/80, pulse 66, height 5' 7.01\" (1.702 m), weight 278 lb 12.8 oz (126.5 kg). Physical Examination: General appearance - alert, well appearing, and in no distress  Mental status - alert, oriented to person, place, and time  Chest - clear to auscultation, no wheezes, rales or rhonchi, symmetric air entry  Heart - normal rate, irregular rhythm, no murmurs, rubs, clicks or gallops  Abdomen - soft, nontender, nondistended  Extremities - peripheral pulses normal, no edema bilateral legs, no clubbing or cyanosis  Skin - normal coloration and turgor, warm, dry    Foot exam 10/22/19:  No lesions, sensation intact with monofilament testing.  +2 DP and PT pulses normal.  Fungal nail infection of all toenails, callous right 1st toe    Diagnostic Data:  I have reviewed recent diagnostic testing including labs 9/2019 and HgA1c today. Assessment/Plan:   Diagnosis Orders   1. Type 2 diabetes mellitus without complication, without long-term current use of insulin ([facility])  POCT glycosylated hemoglobin (Hb A1C)   2. Morbid obesity ([facility] [address]     3. Essential hypertension  metoprolol succinate (TOPROL XL) 100 MG extended release tablet   4. Lower extremity edema  bumetanide (BUMEX) 1 MG tablet         Orders Placed This Encounter   Procedures    POCT glycosylated hemoglobin (Hb A1C)     DM- HgA1c 7.9->6.6->6.8 1/2020 with diet change and weight loss 297->274#->278# in last 6 months. Eye exam done 11/2019 - reminded him to do eye exam yearly - no DM retinopathy, positive macular degeneration and getting injections with Dr. Lorrie Bray. Doing well with change to metformin ER to deal with diarrhea issue. Recommended continued work on diet and exercise for weight loss. Morbid obesity - BMI 43.66 - Up 4# in 3 month with holidays.   He knows what he needs to do and will try to do better now that the holidays

## 2020-02-05 ENCOUNTER — HOSPITAL ENCOUNTER (OUTPATIENT)
Dept: PHARMACY | Age: 80
Setting detail: THERAPIES SERIES
Discharge: HOME | End: 2020-02-05
Payer: MEDICARE

## 2020-02-05 LAB — POC INR: 2.5 (ref 0.8–1.2)

## 2020-02-05 PROCEDURE — 99211 OFF/OP EST MAY X REQ PHY/QHP: CPT

## 2020-02-05 PROCEDURE — 36416 COLLJ CAPILLARY BLOOD SPEC: CPT

## 2020-02-05 PROCEDURE — 85610 PROTHROMBIN TIME: CPT

## 2020-02-05 RX ORDER — WARFARIN SODIUM 5 MG/1
TABLET ORAL
Qty: 130 TABLET | Refills: 5 | Status: SHIPPED | OUTPATIENT
Start: 2020-02-05 | End: 2020-06-02 | Stop reason: SDUPTHER

## 2020-02-05 NOTE — PROGRESS NOTES
[address] [facility]  [phone] (phone)  [phone] (fax)      Mr. Aamir Fernando. is a 70 y.o.  male with history of PE, persistent atrial fib who presents today for anticoagulation monitoring and adjustment. Patient verifies current dosing regimen and tablet strength. No missed or extra doses. Patient denies s/s bleeding/bruising/swelling/SOB/chest pain. No blood in urine or stool. No dietary changes. No changes in medication/Herbals. Started taking osteo-bi-flex daily for knee pain. No change in alcohol use or tobacco use. No change in activity level. Patient denies headaches/dizziness/lightheadedness/falls. No vomiting or acute illness. Has diarrhea sometimes. No procedures scheduled in the future at this time. Assessment:   Lab Results   Component Value Date    INR 2.50 (H) 02/05/2020    INR 2.10 (H) 01/02/2020    INR 2.40 (H) 11/26/2019    PROTIME 35.8 (H) 12/18/2017    PROTIME 21.7 (A) 08/14/2017    PROTIME 52.5 (H) 08/12/2017     INR therapeutic   Recent Labs     02/05/20  1417   INR 2.50*     Plan: POCT INR ordered and result reviewed. Continue Coumadin 5 mg po W, 7.5 mg po MTTHFSS. Recheck INR in 5 weeks. Patient reminded to call the Anticoagulation Clinic with any signs or symptoms of bleeding or with any medication changes. Patient given instructions utilizing the teach back method. Discharged ambulatory in no apparent distress with cane and wife. .    After visit summary printed and reviewed with patient.       Medications reviewed and updated on home medication list Yes    Influenza vaccine:     [] given    [] declined   [x] received previously   [] plans to receive at a later time   [] refused    [x] documented in EPIC

## 2020-03-11 ENCOUNTER — HOSPITAL ENCOUNTER (OUTPATIENT)
Dept: PHARMACY | Age: 80
Setting detail: THERAPIES SERIES
Discharge: HOME | End: 2020-03-11
Payer: MEDICARE

## 2020-03-11 LAB — POC INR: 3 (ref 0.8–1.2)

## 2020-03-11 PROCEDURE — 36416 COLLJ CAPILLARY BLOOD SPEC: CPT

## 2020-03-11 PROCEDURE — 85610 PROTHROMBIN TIME: CPT

## 2020-03-11 PROCEDURE — 99211 OFF/OP EST MAY X REQ PHY/QHP: CPT

## 2020-04-09 ENCOUNTER — TELEPHONE (OUTPATIENT)
Dept: PHARMACY | Age: 80
End: 2020-04-09

## 2020-04-15 ENCOUNTER — HOSPITAL ENCOUNTER (OUTPATIENT)
Dept: PHARMACY | Age: 80
Setting detail: THERAPIES SERIES
Discharge: HOME | End: 2020-04-15
Payer: MEDICARE

## 2020-04-15 ENCOUNTER — NURSE ONLY (OUTPATIENT)
Dept: LAB | Age: 80
End: 2020-04-15

## 2020-04-15 LAB
HCT VFR BLD CALC: 46.9 % (ref 42–52)
HEMOGLOBIN: 14.5 GM/DL (ref 14–18)
INR BLD: 3 (ref 0.85–1.13)

## 2020-04-15 PROCEDURE — 99211 OFF/OP EST MAY X REQ PHY/QHP: CPT

## 2020-04-15 NOTE — PROGRESS NOTES
Medication Management [address] [place] [address]  [phone] (phone)  [phone] (fax)        Anticoagulation encounter completed by telephone. Patient had lab result completed at outside lab.     Mr. Suellen French. is a 70 y.o.  male with history of Afib who presents today for anticoagulation monitoring and adjustment. Patient verifies current dosing regimen and tablet strength. No missed or extra doses. Patient denies s/s bleeding/bruising/swelling/SOB/chest pain  No blood in urine or stool. No dietary changes. No changes in medication/OTC agents/Herbals. No change in alcohol use or tobacco use. No change in activity level. Patient denies headaches/dizziness/lightheadedness/falls. No vomiting/diarrhea or acute illness. No Procedures scheduled in the future at this time. Assessment:   Lab Results   Component Value Date    INR 3.00 (H) 04/15/2020    INR 3.00 (H) 03/11/2020    INR 2.50 (H) 02/05/2020    PROTIME 35.8 (H) 12/18/2017    PROTIME 21.7 (A) 08/14/2017    PROTIME 52.5 (H) 08/12/2017     INR therapeutic   Recent Labs     04/15/20  0837   INR 3.00*     H/H checked -stable    Plan:  Continue Coumadin 5 mg W, 7.5 mg MTThFSS. Recheck INR in 6 weeks. Patient reminded to call the Anticoagulation Clinic with any signs or symptoms of bleeding or with any medication changes. Patient given instructions utilizing the teach back method.     Medications reviewed and updated on home medication list Yes    Influenza vaccine:     [] given    [x] declined   [x] received previously   [] plans to receive at a later time   [] refused    [x] documented in [facility] [address] [place] [address],[address]: MED RECONCILIATION/REVIEW [address]: No  Total # of Interventions Recommended: 0  - Maintenance Safety Lab Monitoring #: 1  Total Interventions Accepted: 1  Time Spent (min): Steve Larkin, EvaD, BCPS

## 2020-04-29 RX ORDER — METFORMIN HYDROCHLORIDE 500 MG/1
1000 TABLET, EXTENDED RELEASE ORAL
Qty: 180 TABLET | Refills: 1 | Status: SHIPPED | OUTPATIENT
Start: 2020-04-29 | End: 2020-05-05

## 2020-04-30 ENCOUNTER — TELEPHONE (OUTPATIENT)
Dept: INTERNAL MEDICINE CLINIC | Age: 80
End: 2020-04-30

## 2020-05-27 ENCOUNTER — NURSE ONLY (OUTPATIENT)
Dept: LAB | Age: 80
End: 2020-05-27

## 2020-05-27 LAB
ALT SERPL-CCNC: 14 U/L (ref 11–66)
ANION GAP SERPL CALCULATED.3IONS-SCNC: 12 MEQ/L (ref 8–16)
AVERAGE GLUCOSE: 141 MG/DL (ref 70–126)
BUN BLDV-MCNC: 31 MG/DL (ref 7–22)
CALCIUM SERPL-MCNC: 9.3 MG/DL (ref 8.5–10.5)
CHLORIDE BLD-SCNC: 104 MEQ/L (ref 98–111)
CHOLESTEROL, TOTAL: 117 MG/DL (ref 100–199)
CO2: 24 MEQ/L (ref 23–33)
CREAT SERPL-MCNC: 0.8 MG/DL (ref 0.4–1.2)
CREATININE, URINE: 115.4 MG/DL
GFR SERPL CREATININE-BSD FRML MDRD: > 90 ML/MIN/1.73M2
GLUCOSE BLD-MCNC: 156 MG/DL (ref 70–108)
HBA1C MFR BLD: 6.7 % (ref 4.4–6.4)
HDLC SERPL-MCNC: 52 MG/DL
LDL CHOLESTEROL CALCULATED: 46 MG/DL
MICROALBUMIN UR-MCNC: 1.74 MG/DL
MICROALBUMIN/CREAT UR-RTO: 15 MG/G (ref 0–30)
POTASSIUM SERPL-SCNC: 4.2 MEQ/L (ref 3.5–5.2)
SODIUM BLD-SCNC: 140 MEQ/L (ref 135–145)
TRIGL SERPL-MCNC: 95 MG/DL (ref 0–199)

## 2020-05-28 ENCOUNTER — APPOINTMENT (OUTPATIENT)
Dept: PHARMACY | Age: 80
End: 2020-05-28
Payer: MEDICARE

## 2020-06-02 ENCOUNTER — HOSPITAL ENCOUNTER (OUTPATIENT)
Dept: PHARMACY | Age: 80
Setting detail: THERAPIES SERIES
Discharge: HOME | End: 2020-06-02
Payer: MEDICARE

## 2020-06-02 ENCOUNTER — NURSE ONLY (OUTPATIENT)
Dept: LAB | Age: 80
End: 2020-06-02

## 2020-06-02 LAB — INR BLD: 2.13 (ref 0.85–1.13)

## 2020-06-02 PROCEDURE — 99212 OFFICE O/P EST SF 10 MIN: CPT | Performed by: PHARMACIST

## 2020-06-02 RX ORDER — WARFARIN SODIUM 5 MG/1
TABLET ORAL
Qty: 135 TABLET | Refills: 3 | Status: ON HOLD | OUTPATIENT
Start: 2020-06-02 | End: 2021-05-28 | Stop reason: HOSPADM

## 2020-07-20 RX ORDER — PAROXETINE HYDROCHLORIDE 20 MG/1
TABLET, FILM COATED ORAL
Qty: 90 TABLET | Refills: 1 | Status: CANCELLED | OUTPATIENT
Start: 2020-07-20

## 2020-07-20 RX ORDER — METOPROLOL SUCCINATE 100 MG/1
TABLET, EXTENDED RELEASE ORAL
Qty: 90 TABLET | Refills: 1 | Status: CANCELLED | OUTPATIENT
Start: 2020-07-20

## 2020-07-20 RX ORDER — RAMIPRIL 10 MG/1
CAPSULE ORAL
Qty: 90 CAPSULE | Refills: 1 | Status: CANCELLED | OUTPATIENT
Start: 2020-07-20

## 2020-07-21 ENCOUNTER — OFFICE VISIT (OUTPATIENT)
Dept: INTERNAL MEDICINE CLINIC | Age: 80
End: 2020-07-21
Payer: MEDICARE

## 2020-07-21 ENCOUNTER — TELEPHONE (OUTPATIENT)
Dept: PHARMACY | Age: 80
End: 2020-07-21

## 2020-07-21 VITALS
HEIGHT: 67 IN | OXYGEN SATURATION: 98 % | BODY MASS INDEX: 44.8 KG/M2 | TEMPERATURE: 97.3 F | HEART RATE: 82 BPM | WEIGHT: 285.4 LBS | SYSTOLIC BLOOD PRESSURE: 110 MMHG | DIASTOLIC BLOOD PRESSURE: 64 MMHG

## 2020-07-21 PROCEDURE — 99214 OFFICE O/P EST MOD 30 MIN: CPT | Performed by: STUDENT IN AN ORGANIZED HEALTH CARE EDUCATION/TRAINING PROGRAM

## 2020-07-21 PROCEDURE — 1036F TOBACCO NON-USER: CPT | Performed by: STUDENT IN AN ORGANIZED HEALTH CARE EDUCATION/TRAINING PROGRAM

## 2020-07-21 PROCEDURE — G8427 DOCREV CUR MEDS BY ELIG CLIN: HCPCS | Performed by: STUDENT IN AN ORGANIZED HEALTH CARE EDUCATION/TRAINING PROGRAM

## 2020-07-21 PROCEDURE — 93000 ELECTROCARDIOGRAM COMPLETE: CPT | Performed by: STUDENT IN AN ORGANIZED HEALTH CARE EDUCATION/TRAINING PROGRAM

## 2020-07-21 PROCEDURE — 4040F PNEUMOC VAC/ADMIN/RCVD: CPT | Performed by: STUDENT IN AN ORGANIZED HEALTH CARE EDUCATION/TRAINING PROGRAM

## 2020-07-21 PROCEDURE — G8417 CALC BMI ABV UP PARAM F/U: HCPCS | Performed by: STUDENT IN AN ORGANIZED HEALTH CARE EDUCATION/TRAINING PROGRAM

## 2020-07-21 PROCEDURE — 1123F ACP DISCUSS/DSCN MKR DOCD: CPT | Performed by: STUDENT IN AN ORGANIZED HEALTH CARE EDUCATION/TRAINING PROGRAM

## 2020-07-21 RX ORDER — PAROXETINE HYDROCHLORIDE 20 MG/1
20 TABLET, FILM COATED ORAL DAILY
Qty: 30 TABLET | Refills: 3 | Status: CANCELLED | OUTPATIENT
Start: 2020-07-21

## 2020-07-21 RX ORDER — RAMIPRIL 10 MG/1
10 CAPSULE ORAL DAILY
Qty: 30 CAPSULE | Refills: 3 | Status: CANCELLED | OUTPATIENT
Start: 2020-07-21

## 2020-07-21 RX ORDER — RAMIPRIL 10 MG/1
CAPSULE ORAL
Qty: 90 CAPSULE | Refills: 1 | Status: SHIPPED | OUTPATIENT
Start: 2020-07-21 | End: 2020-12-15 | Stop reason: SDUPTHER

## 2020-07-21 RX ORDER — METOPROLOL SUCCINATE 100 MG/1
100 TABLET, EXTENDED RELEASE ORAL DAILY
Qty: 30 TABLET | Refills: 5 | Status: CANCELLED | OUTPATIENT
Start: 2020-07-21

## 2020-07-21 RX ORDER — PAROXETINE HYDROCHLORIDE 20 MG/1
TABLET, FILM COATED ORAL
Qty: 90 TABLET | Refills: 1 | Status: SHIPPED | OUTPATIENT
Start: 2020-07-21 | End: 2020-12-15 | Stop reason: SDUPTHER

## 2020-07-21 RX ORDER — METOPROLOL SUCCINATE 100 MG/1
TABLET, EXTENDED RELEASE ORAL
Qty: 90 TABLET | Refills: 1 | Status: SHIPPED | OUTPATIENT
Start: 2020-07-21 | End: 2020-12-15 | Stop reason: SDUPTHER

## 2020-07-21 NOTE — TELEPHONE ENCOUNTER
Message left on patient's answering machine to call for in-clinic appt. 7/28, instead of going to lab (federal approval expires 7/24).

## 2020-07-21 NOTE — PROGRESS NOTES
1940    Chief Complaint   Patient presents with    Diabetes     3 months / last A1c 6.7 on 5/27/20    Hypertension    Hyperlipidemia    Obesity       Pt is a 68 y.o. male who presents for a 3 month check up. Diebetes Mellitus - Last HbA1c in May/2020 was 6.7. States that he hasn't been doing well on his diet. Has been gaining and loosing weight on and off. Does not check his blood glucose at home. No change in vision. 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 Denies SOB/chest pain. Does not take metformin due to side effects. Denies polyuria, polydipsia, polyphagia. Hematuria -  About 3 weeks ago he noticed blood in his urine but he states that it only occured once. No dysuria, no urgency or hesitancy. No burning on urination, no hesitancy. No flank pain. Will keep an eye because he is on Coumadin. No history of kidney stones. Hypertension - well controlled. Denies dizziness, cough. On metoprolol ER and Ramipril. Takes coumadin but has not noticed any bleeding. Last PT/INR on 5/27 at [facility] 2.13     Morbid Obesity: Today his weight is at 285lbs. States that he is trying not to gain further weight. Denies weight loss. BMI today is 44.69 up from 43.04 - encouraged him to get back on the a diet and lose weight.        Past Medical History:   Diagnosis Date    Allergic rhinitis     Atrial fibrillation ([facility] [address] 2/2014    Depression     Diabetes mellitus type II 1/2012    diagnosed with HgA1c 6.6    Erectile dysfunction     Hyperlipidemia     Hypertension     Lower extremity edema     LV dysfunction     h/o, normal now    Morbid obesity ([address]     Osteoarthritis     left knee, right foot, back    PE (pulmonary embolism) 2/2014       Past Surgical History:   Procedure Laterality Date    CHOLECYSTECTOMY      PULMONARY EMBOLISM SURGERY Bilateral 2/8/2014       Current Outpatient Medications   Medication Sig Dispense Refill    warfarin (COUMADIN) 5 MG tablet Take as directed by [facility]. (135 tabs = 90 day supply) 135 tablet 3    metFORMIN (GLUCOPHAGE-XR) 500 MG extended release tablet TAKE 2 TABLETS DAILY WITH BREAKFAST 180 tablet 3    Misc Natural Products (OSTEO BI-FLEX TRIPLE STRENGTH) TABS Take 2 tablets by mouth daily Indications: Knee Pain      metoprolol succinate (TOPROL XL) 100 MG extended release tablet TAKE 1 TABLET DAILY 90 tablet 1    bumetanide (BUMEX) 1 MG tablet TAKE 1 TABLET DAILY AS NEEDED (FLUID OVERLOAD, WEIGHT GAIN, SHORTNESS OF BREATH) 90 tablet 1    PARoxetine (PAXIL) 20 MG tablet TAKE 1 TABLET EVERY MORNING 90 tablet 1    dutasteride (AVODART) 0.5 MG capsule TAKE 1 CAPSULE DAILY 90 capsule 4    ezetimibe-simvastatin (VYTORIN) 10-40 MG per tablet TAKE 1 TABLET NIGHTLY 90 tablet 4    ramipril (ALTACE) 10 MG capsule TAKE 1 CAPSULE DAILY 90 capsule 1    Multiple Vitamins-Minerals (PRESERVISION AREDS 2 PO) Take by mouth daily      fluticasone (FLONASE) 50 MCG/ACT nasal spray USE 2 SPRAYS NASALLY DAILY 48 g 1    aspirin EC 81 MG EC tablet Take 81 mg by mouth daily. Blood thinner; with food.  OLIVE LEAF EXTRACT PO Take 2 tablets by mouth daily as needed supplement      Cholecalciferol (VITAMIN D-3) 5000 UNIT TABS Take  by mouth daily. Indications: Treatment with Vitamin D       No current facility-administered medications for this visit.         Allergies   Allergen Reactions    Lipitor Other (See Comments)     myalgia    Pcn [Penicillins] Rash       Social History     Socioeconomic History    Marital status:      Spouse name: Not on file    Number of children: Not on file    Years of education: Not on file    Highest education level: Not on file   Occupational History    Not on file   Social Needs    Financial resource strain: Not on file    Food insecurity     Worry: Not on file     Inability: Not on file    Transportation needs     Medical: Not on file     Non-medical: Not on file   Tobacco Use    Smoking status: Former Smoker Packs/day: 1.50     Years: 15.00     Pack years: 22.50     Types: Cigarettes     Last attempt to quit: 1984     Years since quittin.7    Smokeless tobacco: Former User     Types: Snuff, Chew   Substance and Sexual Activity    Alcohol use: No    Drug use: No    Sexual activity: Not on file   Lifestyle    Physical activity     Days per week: Not on file     Minutes per session: Not on file    Stress: Not on file   Relationships    Social connections     Talks on phone: Not on file     Gets together: Not on file     Attends [employer] service: Not on file     Active member of club or organization: Not on file     Attends meetings of clubs or organizations: Not on file     Relationship status: Not on file    Intimate partner violence     Fear of current or ex partner: Not on file     Emotionally abused: Not on file     Physically abused: Not on file     Forced sexual activity: Not on file   Other Topics Concern    Not on file   Social History Narrative    Not on file       Family History   Problem Relation Age of Onset    Cancer Mother         esophagus    Cancer Father         colon    Cancer Brother        Review of Systems - General ROS: negative for - chills or fever  Psychological ROS: negative for - anxiety and depression  Hematological and Lymphatic ROS: No history of blood clots or bleeding disorder.    Respiratory ROS: no cough, shortness of breath, or wheezing  Cardiovascular ROS: no chest pain or dyspnea on exertion, tolerates a flight of stairs, vacuuming  Gastrointestinal ROS: no abdominal pain, change in bowel habits, or black or bloody stools  Genito-Urinary ROS: no dysuria, trouble voiding, or hematuria  Musculoskeletal ROS: negative for - muscle pain or muscular weakness, positive  Neurological ROS: negative for - headaches, numbness/tingling, seizures or weakness  Dermatological ROS: negative for - rash or skin lesion changes    Blood pressure (!) 156/84, pulse 82, temperature 97.3 °F (36.3 °C), height 5' 7.01\" (1.702 m), weight 285 lb 6.4 oz (129.5 kg). Physical Examination: General appearance -alert, well appearing, and in no distress  Mental status - alert, oriented to person, place, and time  Head - atraumatic, normocephalic  Eyes - pupils equal and reactive to light, extraocular muscles intact  Ears - external ears are normal, hearing is grossly normal  Mouth - oral pharynx clear, mucous membranes moist  Neck - supple, no significant adenopathy  Chest - clear to auscultation, no wheezes, rales or rhonchi, symmetric air entry  Heart - normal rate, regular rhythm, normal S1, S2, no murmurs, rubs, clicks or gallops  Abdomen -soft, nontender, nondistended  Neurological - alert, oriented, cranial nerves II-XII intact, motor and sensation are grossly intact bilateral upper and lower extremities. Extremities - peripheral pulses normal, no pedal edema, no clubbing or cyanosis  Skin - warm and dry    Diagnostic data:   I have reviewed recent diagnostic testing including labs with patient today. Assessment and Plan:    1. Diabetes Mellitus - recent HbA1C 5/27 at 6.7 which is almost unchanged since last visit 1/20 at 6.8. Pt is not taking Metformin ER and prefers to diet. Recent Microalbumin/Cr ratio 15, a decrease from 32 in 1/20. Lipid level and BMPfrom 5/20 normal.  Recommended to continue to work on diet and exercise. - check HbA1C before next visit   - BMP today    2. Hypertension - BP today 110/64. Compliant with Metoprolol succinate 100mg and Ramipril 10mg. Will continue current dosages     2. Hematuria - one time occurrence in the last 3 weeks. On coumadin. Will monitor. No dysuria, no urgency or hesitancy. No burning on urination, no hesitancy. No flank pain. Low suspicion for UTI/Kidney stones. 2. Morbid Obesity - BMI 44.69, up from last visit in 1/20.   - Advised to diet and exercise     Return in 6 months. Diagnosis Orders   1.  Type 2 diabetes mellitus without

## 2020-07-28 ENCOUNTER — APPOINTMENT (OUTPATIENT)
Dept: PHARMACY | Age: 80
End: 2020-07-28
Payer: MEDICARE

## 2020-07-28 ENCOUNTER — HOSPITAL ENCOUNTER (OUTPATIENT)
Dept: PHARMACY | Age: 80
Setting detail: THERAPIES SERIES
Discharge: HOME | End: 2020-07-28
Payer: MEDICARE

## 2020-07-28 VITALS — TEMPERATURE: 98 F

## 2020-07-28 LAB — POC INR: 2.1 (ref 0.8–1.2)

## 2020-07-28 PROCEDURE — 36416 COLLJ CAPILLARY BLOOD SPEC: CPT

## 2020-07-28 PROCEDURE — 99211 OFF/OP EST MAY X REQ PHY/QHP: CPT

## 2020-07-28 PROCEDURE — 85610 PROTHROMBIN TIME: CPT

## 2020-07-28 NOTE — PROGRESS NOTES
Medication Management [address]  [phone] (phone)  [phone] (fax)      Mr. Pravin Lockhart is a 68 y.o.  male with history of PE, Afib who presents today for anticoagulation monitoring and adjustment. Patient verifies current dosing regimen and tablet strength. No missed or extra doses. Patient denies s/s bleeding/bruising/swelling/SOB/chest pain. No blood in urine or stool. Blood in urine from passing kidney stone about 3 weeks ago. No dietary changes. No changes in medication/OTC agents/Herbals. No change in alcohol use or tobacco use. No change in activity level. Patient denies headaches/dizziness/lightheadedness/falls. No vomiting/diarrhea or acute illness. No Procedures scheduled in the future at this time. Assessment:   Lab Results   Component Value Date    INR 2.10 (H) 07/28/2020    INR 2.13 (H) 06/02/2020    INR 3.00 (H) 04/15/2020    PROTIME 35.8 (H) 12/18/2017    PROTIME 21.7 (A) 08/14/2017    PROTIME 52.5 (H) 08/12/2017     INR therapeutic   Recent Labs     07/28/20  0735   INR 2.10*   Patient interview completed and discussed with pharmacist by Dayton Johnson, PharmD Candidate. Plan:  Continue Coumadin 5 mg W, 7.5 mg MTThFSS. Recheck INR in 6 weeks. Patient reminded to call the Anticoagulation Clinic with any signs or symptoms of bleeding or with any medication changes. Patient given instructions utilizing the teach back method. Discharged ambulatory in no apparent distress. After visit summary printed and reviewed with patient.       Medications reviewed and updated on home medication list Yes    Influenza vaccine:     [] given    [x] declined   [] received previously   [] plans to receive at a later time   [] refused    [] documented in [facility]: MED RECONCILIATION/REVIEW [address]: No  Total # of Interventions Recommended: 1  - Maintenance Safety Lab Monitoring #: 1  Total Interventions Accepted: 1  Time Spent (min): [address], PharmD, BCPS  7/28/2020  7:50 AM

## 2020-08-19 PROBLEM — I50.33 ACUTE ON CHRONIC DIASTOLIC CONGESTIVE HEART FAILURE (HCC): Status: RESOLVED | Noted: 2018-03-25 | Resolved: 2020-08-19

## 2020-09-09 ENCOUNTER — HOSPITAL ENCOUNTER (OUTPATIENT)
Dept: PHARMACY | Age: 80
Setting detail: THERAPIES SERIES
Discharge: HOME | End: 2020-09-09
Payer: MEDICARE

## 2020-09-09 VITALS — TEMPERATURE: 98.2 F

## 2020-09-09 LAB — POC INR: 2.2 (ref 0.8–1.2)

## 2020-09-09 PROCEDURE — 85610 PROTHROMBIN TIME: CPT

## 2020-09-09 PROCEDURE — 99211 OFF/OP EST MAY X REQ PHY/QHP: CPT

## 2020-09-09 PROCEDURE — 36416 COLLJ CAPILLARY BLOOD SPEC: CPT

## 2020-09-09 NOTE — PROGRESS NOTES
Medication Management [address]  [phone] (phone)  [phone] (fax)      Mr. Miriam Alicia is a 68 y.o.  male with history of PE, Afib who presents today for anticoagulation monitoring and adjustment. Patient verifies current dosing regimen and tablet strength. No missed or extra doses. Patient denies s/s bleeding/bruising/swelling/SOB/chest pain   No blood in urine or stool. - Reports a little blood in urine a few weeks ago but has since cleared up. May have passed another kidney stone. No dietary changes. No changes in medication/OTC agents/Herbals. No change in alcohol use or tobacco use. No change in activity level. Patient denies headaches/dizziness/lightheadedness/falls. - Fall the other day at home, but was able to get up on own following the incident. Reports no bruising after the fall. No vomiting/diarrhea or acute illness. No Procedures scheduled in the future at this time. Assessment:   Lab Results   Component Value Date    INR 2.20 (H) 09/09/2020    INR 2.10 (H) 07/28/2020    INR 2.13 (H) 06/02/2020    PROTIME 35.8 (H) 12/18/2017    PROTIME 21.7 (A) 08/14/2017    PROTIME 52.5 (H) 08/12/2017     INR therapeutic   Recent Labs     09/09/20  0727   INR 2.20*     Plan:  Continue Coumadin 5 mg W, 7.5 mg MTTFSS. Recheck INR in 6 week(s). Patient reminded to call the Anticoagulation Clinic with any signs or symptoms of bleeding or with any medication changes. Patient given instructions utilizing the teach back method. Discharged ambulatory in no apparent distress. After visit summary printed and reviewed with patient.       Medications reviewed and updated on home medication list Yes    CLINICAL PHARMACY CONSULT: MED RECONCILIATION/REVIEW ADDENDUM    For Pharmacy Admin Tracking Only    PHSO: No  Total # of Interventions Recommended: 0  - Maintenance Safety Lab Monitoring #: 1  Total Interventions Accepted: 0  Time Spent (min): [address], PharmD, BCPS  9/9/2020  7:44 AM

## 2020-10-21 ENCOUNTER — HOSPITAL ENCOUNTER (OUTPATIENT)
Dept: PHARMACY | Age: 80
Setting detail: THERAPIES SERIES
Discharge: HOME | End: 2020-10-21
Payer: MEDICARE

## 2020-10-21 VITALS — TEMPERATURE: 97.4 F

## 2020-10-21 LAB — POC INR: 2.5 (ref 0.8–1.2)

## 2020-10-21 PROCEDURE — 36416 COLLJ CAPILLARY BLOOD SPEC: CPT

## 2020-10-21 PROCEDURE — 85610 PROTHROMBIN TIME: CPT

## 2020-10-21 PROCEDURE — 99211 OFF/OP EST MAY X REQ PHY/QHP: CPT

## 2020-10-21 NOTE — PROGRESS NOTES
Medication Management [address]  [phone] (phone)  [phone] (fax)      Mr. Marsha Handley. is a 68 y.o.  male with history of atrial fibrillation, PE who presents today for anticoagulation monitoring and adjustment. Patient verifies current dosing regimen and tablet strength. No missed or extra doses. Patient denies s/s bleeding/bruising/swelling/SOB/chest pain  No blood in urine or stool. No dietary changes. No changes in medication/OTC agents/Herbals. No change in alcohol use or tobacco use. Patient has been walking more. Patient denies headaches/dizziness/lightheadedness. Patient reports falling a few times since last visit, but denies hitting head. He states his knees buckle often. No vomiting/diarrhea or acute illness. No Procedures scheduled in the future at this time. Assessment:   Lab Results   Component Value Date    INR 2.50 (H) 10/21/2020    INR 2.20 (H) 09/09/2020    INR 2.10 (H) 07/28/2020    PROTIME 35.8 (H) 12/18/2017    PROTIME 21.7 (A) 08/14/2017    PROTIME 52.5 (H) 08/12/2017     INR therapeutic   Recent Labs     10/21/20  1100   INR 2.50*     Patient has been stable on current regimen since November 2019. Plan:  Continue Coumadin 5 mg W and 7.5 mg [id number]. Recheck INR in 6 week(s). Patient reminded to call the Anticoagulation Clinic with any signs or symptoms of bleeding or with any medication changes. Patient given instructions utilizing the teach back method. Discharged ambulatory in no apparent distress. After visit summary printed and reviewed with patient.       Medications reviewed and updated on home medication list Yes    Influenza vaccine:     [] given    [x] declined   [] received previously   [x] plans to receive at a later time   [] refused    [] documented in [address]:  [facility]: No  Total # of Interventions Recommended: 0  - Maintenance Safety Lab Monitoring #: 1  Total Interventions Accepted: 0  Time Spent (min): [address], PharmD, BCPS  10/21/2020  11:27 AM

## 2020-11-18 ENCOUNTER — NURSE ONLY (OUTPATIENT)
Dept: LAB | Age: 80
End: 2020-11-18

## 2020-11-18 LAB
ANION GAP SERPL CALCULATED.3IONS-SCNC: 12 MEQ/L (ref 8–16)
BUN BLDV-MCNC: 20 MG/DL (ref 7–22)
CALCIUM SERPL-MCNC: 9.5 MG/DL (ref 8.5–10.5)
CHLORIDE BLD-SCNC: 103 MEQ/L (ref 98–111)
CO2: 26 MEQ/L (ref 23–33)
CREAT SERPL-MCNC: 0.7 MG/DL (ref 0.4–1.2)
GFR SERPL CREATININE-BSD FRML MDRD: > 90 ML/MIN/1.73M2
GLUCOSE BLD-MCNC: 169 MG/DL (ref 70–108)
HCT VFR BLD CALC: 46.8 % (ref 42–52)
HEMOGLOBIN: 14.5 GM/DL (ref 14–18)
POTASSIUM SERPL-SCNC: 4.2 MEQ/L (ref 3.5–5.2)
SODIUM BLD-SCNC: 141 MEQ/L (ref 135–145)

## 2020-12-02 ENCOUNTER — HOSPITAL ENCOUNTER (OUTPATIENT)
Dept: PHARMACY | Age: 80
Setting detail: THERAPIES SERIES
Discharge: HOME | End: 2020-12-02
Payer: MEDICARE

## 2020-12-02 VITALS — TEMPERATURE: 95 F

## 2020-12-02 LAB — POC INR: 2.4 (ref 0.8–1.2)

## 2020-12-02 PROCEDURE — 36416 COLLJ CAPILLARY BLOOD SPEC: CPT

## 2020-12-02 PROCEDURE — 99211 OFF/OP EST MAY X REQ PHY/QHP: CPT

## 2020-12-02 PROCEDURE — 85610 PROTHROMBIN TIME: CPT

## 2020-12-02 NOTE — PROGRESS NOTES
Medication Management [address]   [phone] (phone)  [phone] (fax)      Mr. Ryan Meadows. is a 68 y.o.  male with history of recurrent PE/persistent atrial fib. , per Dr. Dandre Blunt referral, who presents today for Warfarin monitoring and adjustment (6 week visit). Patient verifies current dosing regimen and tablet strength. No missed or extra doses. Patient denies bleeding/bruising/chest pain. Has usual swelling of legs/feet - wears support socks and props up legs/feet at home. Has usual GARCÍA. No blood in urine or stool. No dietary changes. No changes in medication/OTC agents/herbals. No change in alcohol use or tobacco use. Change in activity level: decreased. Patient denies headaches/dizziness/lightheadedness/falls. No vomiting/diarrhea or acute illness. No procedures scheduled in the future at this time. At first, stated wanted to switch to [facility] because of proximity to home and less distance to walk. By end of visit, changed his mind. Discussed options for transport chair at front entrance or coming in from [address]. Assessment:   Lab Results   Component Value Date    INR 2.40 (H) 12/02/2020    INR 2.50 (H) 10/21/2020    INR 2.20 (H) 09/09/2020    PROTIME 35.8 (H) 12/18/2017    PROTIME 21.7 (A) 08/14/2017    PROTIME 52.5 (H) 08/12/2017     INR therapeutic - goal 2-3. Recent Labs     12/02/20  1107   INR 2.40*     Did routine H&H 11/18:  14.5/46.8 (14.5/46.9 on 4/15/20). Plan:  POCT INR ordered/performed/result reviewed. Continue PO Coumadin 5 mg W, 7.5 mg MTThFSS. Recheck INR in 6 week(s). Patient reminded to call the Anticoagulation Clinic with any signs or symptoms of bleeding or with any medication changes. Patient given instructions utilizing the teach back method. Discharged ambulatory in no apparent distress, wearing mask, with four-wheeled walker and wife.       After visit summary printed and reviewed with patient.       Medications reviewed and updated on home medication list.    Influenza vaccine:     [] given    [] declined   [] received previously   [] plans to receive at a later time   [] refused    [] documented in Epic

## 2020-12-15 ENCOUNTER — OFFICE VISIT (OUTPATIENT)
Dept: INTERNAL MEDICINE CLINIC | Age: 80
End: 2020-12-15
Payer: MEDICARE

## 2020-12-15 VITALS
SYSTOLIC BLOOD PRESSURE: 134 MMHG | HEART RATE: 82 BPM | DIASTOLIC BLOOD PRESSURE: 72 MMHG | HEIGHT: 67 IN | WEIGHT: 289 LBS | BODY MASS INDEX: 45.36 KG/M2 | TEMPERATURE: 97.2 F

## 2020-12-15 LAB — HBA1C MFR BLD: 7.1 % (ref 4.3–5.7)

## 2020-12-15 PROCEDURE — 90694 VACC AIIV4 NO PRSRV 0.5ML IM: CPT | Performed by: INTERNAL MEDICINE

## 2020-12-15 PROCEDURE — 1123F ACP DISCUSS/DSCN MKR DOCD: CPT | Performed by: STUDENT IN AN ORGANIZED HEALTH CARE EDUCATION/TRAINING PROGRAM

## 2020-12-15 PROCEDURE — 99214 OFFICE O/P EST MOD 30 MIN: CPT | Performed by: STUDENT IN AN ORGANIZED HEALTH CARE EDUCATION/TRAINING PROGRAM

## 2020-12-15 PROCEDURE — 3051F HG A1C>EQUAL 7.0%<8.0%: CPT | Performed by: STUDENT IN AN ORGANIZED HEALTH CARE EDUCATION/TRAINING PROGRAM

## 2020-12-15 PROCEDURE — G8484 FLU IMMUNIZE NO ADMIN: HCPCS | Performed by: STUDENT IN AN ORGANIZED HEALTH CARE EDUCATION/TRAINING PROGRAM

## 2020-12-15 PROCEDURE — 83036 HEMOGLOBIN GLYCOSYLATED A1C: CPT | Performed by: STUDENT IN AN ORGANIZED HEALTH CARE EDUCATION/TRAINING PROGRAM

## 2020-12-15 PROCEDURE — 4040F PNEUMOC VAC/ADMIN/RCVD: CPT | Performed by: STUDENT IN AN ORGANIZED HEALTH CARE EDUCATION/TRAINING PROGRAM

## 2020-12-15 PROCEDURE — G0008 ADMIN INFLUENZA VIRUS VAC: HCPCS | Performed by: INTERNAL MEDICINE

## 2020-12-15 PROCEDURE — G8417 CALC BMI ABV UP PARAM F/U: HCPCS | Performed by: STUDENT IN AN ORGANIZED HEALTH CARE EDUCATION/TRAINING PROGRAM

## 2020-12-15 PROCEDURE — G8427 DOCREV CUR MEDS BY ELIG CLIN: HCPCS | Performed by: STUDENT IN AN ORGANIZED HEALTH CARE EDUCATION/TRAINING PROGRAM

## 2020-12-15 PROCEDURE — 1036F TOBACCO NON-USER: CPT | Performed by: STUDENT IN AN ORGANIZED HEALTH CARE EDUCATION/TRAINING PROGRAM

## 2020-12-15 RX ORDER — RAMIPRIL 10 MG/1
CAPSULE ORAL
Qty: 90 CAPSULE | Refills: 1 | Status: SHIPPED | OUTPATIENT
Start: 2020-12-15

## 2020-12-15 RX ORDER — METOPROLOL SUCCINATE 100 MG/1
TABLET, EXTENDED RELEASE ORAL
Qty: 90 TABLET | Refills: 1 | Status: SHIPPED | OUTPATIENT
Start: 2020-12-15

## 2020-12-15 RX ORDER — METFORMIN HYDROCHLORIDE 500 MG/1
TABLET, EXTENDED RELEASE ORAL
Qty: 180 TABLET | Refills: 3 | Status: SHIPPED | OUTPATIENT
Start: 2020-12-15

## 2020-12-15 RX ORDER — PAROXETINE HYDROCHLORIDE 20 MG/1
TABLET, FILM COATED ORAL
Qty: 90 TABLET | Refills: 1 | Status: SHIPPED | OUTPATIENT
Start: 2020-12-15 | End: 2021-04-19 | Stop reason: DRUGHIGH

## 2020-12-15 NOTE — PROGRESS NOTES
1940    Chief Complaint   Patient presents with    Diabetes     3 months     Hypertension    Hyperlipidemia    Atrial Fibrillation    Depression       Pt is a 68 y.o. male who presents for a 5 month follow up visit for chronic conditions. Patient has been having intermittent mechanical falls 4-5 for the last 3 months due to losening his balance, states that his right knee is hurting him more than left, leg giving out. Denies LOC, or hitting his head recently. 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 Weakness at the right hip, hip flexor 3/5 right, 5/5 left, recommending physical therapy although he is  reluctant. No injury to the hip, denies pain in the hip. Patient wants to see ortho first then physical therapy. DM: States that he does not measure his blood glucose at home. He is not eating too many sweets. Continues to take Metformin 500 mg BID and denies medication side effects Now just on ASA, Vytorin, and Ramipril. HgA1c 7.1 12/2020. Eye exam done 12/7/20- no DM retinopathy but has macular degeneration and is getting injections - Dr. Luis Sewell  He denies foot lesions, neuropathy.     Denies autonomic dysfunction.   eGFR normal 12/15/20 microalbumin 5/27/20 normal    He is to be checking FSBS daily - not doing it. Morbid obesity - states that he will try to start eating better. No change in weight since last visit, BMI high 45.25     Hyperlipidemia - last LDL 46 at goal with normal ALT 5/2020 on Vytorin. Depression - feels like he does not need to take the medication. stable. On Paxil. Discussed that at next visit will discuss coming off the Paxil. Atrial fibrillation/history of PE - on coumadin with IRN managed by coumadin clinic, last INR 2.40 (12/2).         Past Medical History:   Diagnosis Date    Allergic rhinitis     Atrial fibrillation ([facility] [address] 2/2014    Depression     Diabetes mellitus type II 1/2012 diagnosed with HgA1c 6.6    Erectile dysfunction     Hyperlipidemia     Hypertension     Lower extremity edema     LV dysfunction     h/o, normal now    Morbid obesity (HCC)     Osteoarthritis     left knee, right foot, back    PE (pulmonary embolism) 2/2014       Past Surgical History:   Procedure Laterality Date    CHOLECYSTECTOMY      PULMONARY EMBOLISM SURGERY Bilateral 2/8/2014       Current Outpatient Medications   Medication Sig Dispense Refill    ramipril (ALTACE) 10 MG capsule TAKE 1 CAPSULE DAILY 90 capsule 1    metoprolol succinate (TOPROL XL) 100 MG extended release tablet TAKE 1 TABLET DAILY 90 tablet 1    PARoxetine (PAXIL) 20 MG tablet TAKE 1 TABLET EVERY MORNING 90 tablet 1    metFORMIN (GLUCOPHAGE-XR) 500 MG extended release tablet TAKE 2 TABLETS DAILY WITH BREAKFAST 180 tablet 3    warfarin (COUMADIN) 5 MG tablet Take as directed by [facility]. (135 tabs = 90 day supply) 135 tablet 3    Misc Natural Products (OSTEO BI-FLEX TRIPLE STRENGTH) TABS Take 2 tablets by mouth daily Indications: Knee Pain      bumetanide (BUMEX) 1 MG tablet TAKE 1 TABLET DAILY AS NEEDED (FLUID OVERLOAD, WEIGHT GAIN, SHORTNESS OF BREATH) 90 tablet 1    dutasteride (AVODART) 0.5 MG capsule TAKE 1 CAPSULE DAILY 90 capsule 4    ezetimibe-simvastatin (VYTORIN) 10-40 MG per tablet TAKE 1 TABLET NIGHTLY 90 tablet 4    Multiple Vitamins-Minerals (PRESERVISION AREDS 2 PO) Take by mouth daily      fluticasone (FLONASE) 50 MCG/ACT nasal spray USE 2 SPRAYS NASALLY DAILY (Patient taking differently: as needed ) 48 g 1    aspirin EC 81 MG EC tablet Take 81 mg by mouth daily. Blood thinner; with food.  OLIVE LEAF EXTRACT PO Take 4 tablets by mouth daily as needed supplement      Cholecalciferol (VITAMIN D-3) 5000 UNIT TABS Take  by mouth daily. Indications: Treatment with Vitamin D       No current facility-administered medications for this visit.         Allergies   Allergen Reactions  Lipitor Other (See Comments)     myalgia    Pcn [Penicillins] Rash       Social History     Socioeconomic History    Marital status:      Spouse name: Not on file    Number of children: Not on file    Years of education: Not on file    Highest education level: Not on file   Occupational History    Not on file   Social Needs    Financial resource strain: Not on file    Food insecurity     Worry: Not on file     Inability: Not on file    Transportation needs     Medical: Not on file     Non-medical: Not on file   Tobacco Use    Smoking status: Former Smoker     Packs/day: 1.50     Years: 15.00     Pack years: 22.50     Types: Cigarettes     Quit date: 1984     Years since quittin.1    Smokeless tobacco: Former User     Types: Snuff, Chew   Substance and Sexual Activity    Alcohol use: No    Drug use: No    Sexual activity: Not on file   Lifestyle    Physical activity     Days per week: Not on file     Minutes per session: Not on file    Stress: Not on file   Relationships    Social connections     Talks on phone: Not on file     Gets together: Not on file     Attends Taoism service: Not on file     Active member of club or organization: Not on file     Attends meetings of clubs or organizations: Not on file     Relationship status: Not on file    Intimate partner violence     Fear of current or ex partner: Not on file     Emotionally abused: Not on file     Physically abused: Not on file     Forced sexual activity: Not on file   Other Topics Concern    Not on file   Social History Narrative    Not on file       Family History   Problem Relation Age of Onset    Cancer Mother         esophagus    Cancer Father         colon    Cancer Brother        Review of Systems - General ROS: negative for - chills or fever  Psychological ROS: negative for - anxiety and depression  Hematological and Lymphatic ROS: No history of blood clots or bleeding disorder. 1. Type 2 diabetes mellitus without complication, without long-term current use of insulin ([facility])  POCT glycosylated hemoglobin (Hb A1C)    42439 - Collection Capillary Blood Specimen    ramipril (ALTACE) 10 MG capsule    metFORMIN (GLUCOPHAGE-XR) 500 MG extended release tablet   2. Weakness of left hip  External Referral To Physical Therapy    External Referral To Orthopedic Surgery   3. Essential hypertension  ramipril (ALTACE) 10 MG capsule    metoprolol succinate (TOPROL XL) 100 MG extended release tablet   4. Recurrent major depressive disorder, in partial remission (HCC)  PARoxetine (PAXIL) 20 MG tablet   5. Need for influenza vaccination  INFLUENZA, QUADV, ADJUVANTED, 65 YRS =, IM, PF, PREFILL SYR, 0.5ML (FLUAD)    RI ADMIN INFLUENZA VIRUS VAC     1. NIDDM - uncontrolled. HgA1C 7.1 today, up from 6.7. Patient noncompliant with diet. He does not measure blood glucose level at home. Discussed again diet and goals for better glycemic control. - - Continue Metformin 500 mg twice a day    2. Weakness of left hip - leg giving out and causing patient to have recent falls. On physical exam: hip flexor 3/5 right, hip flexor 5/5 left, recommending physical therapy although he is reluctant. No injury to the hip, denies pain in the hip.  - referal to Ortho and physical therapy  - reasses at next visit      3. Essential HTN - blood pressure remains WNL, 134/72. - will continue ramipril and metoprolol. Continue to monitor    4. Depression - patient wants to discontinue Paroxetine. Will re-evaluate for possible discontinuing of Paroxetine at next visit.

## 2020-12-15 NOTE — PROGRESS NOTES
After obtaining consent, and per orders of Dr. Jong Blount, injection of high dose flu vaccine  given in left deltoid by [facility]. Patient instructed to remain in clinic for 20 minutes afterwards, and to report any adverse reaction to me immediately.

## 2020-12-31 DIAGNOSIS — N40.0 BENIGN NON-NODULAR PROSTATIC HYPERPLASIA WITHOUT LOWER URINARY TRACT SYMPTOMS: ICD-10-CM

## 2020-12-31 DIAGNOSIS — E78.00 PURE HYPERCHOLESTEROLEMIA: ICD-10-CM

## 2021-01-03 RX ORDER — EZETIMIBE AND SIMVASTATIN 10; 40 MG/1; MG/1
TABLET ORAL
Qty: 90 TABLET | Refills: 1 | Status: SHIPPED | OUTPATIENT
Start: 2021-01-03 | End: 2021-07-02

## 2021-01-03 RX ORDER — DUTASTERIDE 0.5 MG/1
CAPSULE, LIQUID FILLED ORAL
Qty: 90 CAPSULE | Refills: 3 | Status: SHIPPED | OUTPATIENT
Start: 2021-01-03

## 2021-01-13 ENCOUNTER — HOSPITAL ENCOUNTER (OUTPATIENT)
Dept: PHARMACY | Age: 81
Setting detail: THERAPIES SERIES
Discharge: HOME | End: 2021-01-13
Payer: MEDICARE

## 2021-01-13 VITALS — TEMPERATURE: 98.5 F

## 2021-01-13 DIAGNOSIS — I26.99 PULMONARY EMBOLISM, UNSPECIFIED CHRONICITY, UNSPECIFIED PULMONARY EMBOLISM TYPE, UNSPECIFIED WHETHER ACUTE COR PULMONALE PRESENT (HCC): ICD-10-CM

## 2021-01-13 DIAGNOSIS — I48.19 PERSISTENT ATRIAL FIBRILLATION (HCC): ICD-10-CM

## 2021-01-13 LAB — POC INR: 3.5 (ref 0.8–1.2)

## 2021-01-13 PROCEDURE — 99211 OFF/OP EST MAY X REQ PHY/QHP: CPT

## 2021-01-13 PROCEDURE — 85610 PROTHROMBIN TIME: CPT

## 2021-01-13 PROCEDURE — 36416 COLLJ CAPILLARY BLOOD SPEC: CPT

## 2021-01-13 RX ORDER — ACETAMINOPHEN/DIPHENHYDRAMINE 500MG-25MG
2 TABLET ORAL NIGHTLY PRN
COMMUNITY

## 2021-01-13 RX ORDER — ACETAMINOPHEN 500 MG
500 TABLET ORAL EVERY 6 HOURS PRN
COMMUNITY

## 2021-01-13 NOTE — PROGRESS NOTES
Medication Management [address] [place] [address]  [phone] (phone)  [phone] (fax)    Mr. Milla Rea is a 68 y.o.  male with history of recurrent PE/persistent atrial fib. , per Dr. Olamide Fontenot referral, who presents today for Warfarin monitoring and adjustment (6 week visit). Patient verifies current dosing regimen and tablet strength. No missed or extra doses. Patient denies bleeding/swelling/chest pain. Has usual SOB/easy bruising. No blood in urine or stool. No dietary changes. Changes in medication/OTC agents/herbals: recently started taking 2 Tylenol PM some nights, as well as Tylenol for knees during the day. Reminded to limit all Tylenol to 3,000 mg/day. No change in alcohol use or tobacco use. No change in activity level. Under more stress. Patient denies headaches/dizziness/lightheadedness. Fell onto buttocks 1 day - knees gave out. No vomiting/diarrhea or acute illness. No procedures scheduled in the future at this time. Had \"steroid\" injections to both knees 6 days ago. Assessment:   Lab Results   Component Value Date    INR 3.50 (H) 01/13/2021    INR 2.40 (H) 12/02/2020    INR 2.50 (H) 10/21/2020    PROTIME 35.8 (H) 12/18/2017    PROTIME 21.7 (A) 08/14/2017    PROTIME 52.5 (H) 08/12/2017     INR supratherapeutic - goal 2-3. Recent Labs     01/13/21  1114   INR 3.50*     Plan:  POCT INR ordered/performed/result reviewed. Hold today, W, then continue PO Coumadin 5 mg W, 7.5 mg MTThFSS. Recheck INR in 4 week(s); patient, however, won't come any sooner than 6 weeks due to mobility issues (understands risk of not checking INR sooner). (Report given - orders entered by JACQUI Larsen, PharmD.)  Patient reminded to call the Anticoagulation Clinic with any signs or symptoms of bleeding or with any medication changes. Patient given instructions utilizing the teach back method. Advised extra caution. Discharged via transport chair in no apparent distress, wearing mask/holding cane, with wife. After visit summary printed and reviewed with patient.       Medications reviewed and updated on home medication list.    Influenza vaccine:     [] given    [x] declined   [x] received previously   [] plans to receive at a later time   [] refused    [x] documented in Epic

## 2021-02-24 ENCOUNTER — HOSPITAL ENCOUNTER (OUTPATIENT)
Dept: PHARMACY | Age: 81
Setting detail: THERAPIES SERIES
Discharge: HOME | End: 2021-02-24
Payer: MEDICARE

## 2021-02-24 VITALS — TEMPERATURE: 98.4 F

## 2021-02-24 DIAGNOSIS — I26.99 PULMONARY EMBOLISM, UNSPECIFIED CHRONICITY, UNSPECIFIED PULMONARY EMBOLISM TYPE, UNSPECIFIED WHETHER ACUTE COR PULMONALE PRESENT (HCC): ICD-10-CM

## 2021-02-24 DIAGNOSIS — I48.19 PERSISTENT ATRIAL FIBRILLATION (HCC): ICD-10-CM

## 2021-02-24 PROBLEM — Q66.01 CONGENITAL TALIPES EQUINOVARUS DEFORMITY OF RIGHT FOOT: Status: ACTIVE | Noted: 2021-02-24

## 2021-02-24 LAB — POC INR: 3 (ref 0.8–1.2)

## 2021-02-24 PROCEDURE — 36416 COLLJ CAPILLARY BLOOD SPEC: CPT

## 2021-02-24 PROCEDURE — 99211 OFF/OP EST MAY X REQ PHY/QHP: CPT

## 2021-02-24 PROCEDURE — 85610 PROTHROMBIN TIME: CPT

## 2021-02-24 NOTE — PROGRESS NOTES
Medication Management [address]  [phone] (phone)  [phone] (fax)      Mr. Willy Rosario is a 68 y.o.  male with history of recurrent PE/persistent atrial fib. , per Dr. Sharene Meckel referral, who presents today for Warfarin monitoring and adjustment (6 week visit). Patient verifies current dosing regimen and tablet strength. No missed (except as ordered last visit) or extra doses. Patient denies bleeding/chest pain. Has usual SOB. No blood in urine or stool. No dietary changes. No changes in medication/OTC agents/herbals. No change in alcohol use or tobacco use. Change in activity level: decreased. Patient denies headaches/dizziness/lightheadedness. Had 3 falls since podiatrist manipulated right club foot (to get brace). Hit head twice - called EMS twice to help him get up. States they checked him out. Has bruising of right leg, with some swelling. No vomiting or acute illness. Takes nothing for intermittent diarrhea with Metformin (takes with meal). No procedures scheduled in the future at this time. Getting lift chair. Assessment:   Lab Results   Component Value Date    INR 3.00 (H) 02/24/2021    INR 3.50 (H) 01/13/2021    INR 2.40 (H) 12/02/2020    PROTIME 35.8 (H) 12/18/2017    PROTIME 21.7 (A) 08/14/2017    PROTIME 52.5 (H) 08/12/2017     INR therapeutic - goal 2-3. Recent Labs     02/24/21  1402   INR 3.00*       Plan:  POCT INR ordered/performed/result reviewed. Continue PO Coumadin 5 mg W, 7.5 mg MTThFSS. Recheck INR in 6 week(s); patient does not want to come any sooner. (Report given - orders entered by Noris Frausto RPh., PharmD.)  Patient reminded to call the Anticoagulation Clinic with any signs or symptoms of bleeding or with any medication changes. Patient given instructions utilizing the teach back method. Discharged via transport chair in no apparent distress, wearing mask, with wife.

## 2021-03-22 NOTE — PROGRESS NOTES
Solange Graff (:  1940) is a 68 y.o. male,Established patient, here for evaluation of the following chief complaint(s): 3 month follow up. Diabetes (3 months ), Hypertension, Depression, and Other (weakness left hip)      ASSESSMENT/PLAN:  1. Frequent falls  -     [address] [facility]  -     Elevated toilet seat  -     DME Order for Bedside Commode as OP  2. Muscle weakness  -     CK; Future  3. Weakness of right hip  -     XR HIP RIGHT (2-3 VIEWS); Future  -     [address]     Elevated toilet seat  -     DME Order for Bedside Commode as OP  4. Type 2 diabetes mellitus without complication, without long-term current use of insulin ([facility])  -     POCT glycosylated hemoglobin (Hb A1C)  -     62613 - Collection Capillary Blood Specimen  5. Type 2 diabetes mellitus with hyperglycemia, without long-term current use of insulin ([facility])  -     linagliptin (TRADJENTA) 5 MG tablet; Take 1 tablet by mouth daily, Disp-90 tablet, R-1Normal      Return in about 2 months (around 2021). SUBJECTIVE/OBJECTIVE:  HPI During the previous visit in 2020 patient was complaining of mechanical falls but no LOC or hitting his head. He did have weakness of the left leg at that time. Recommended physical therapy and made referal to ortho. Today patient states that legs continue to give out and he continues to fall. States that he falls without any warning signs. States that he watches how he falls. Patient went to [facility] with [facility] in February and he received steroid shots in bilateral knees but reports that this has not change the pain. Patient has difficulty getting up from a sitting position and unable to walk at all. Uses a walker to move around in the house. Patient called fire dept 6x to be picked up after falling but he was never taken to the ED. Reports back pain in the past. Patient wants another wheelchair, bathroom chair for the toilet, portable toilet (comode).  No weight measurement today as patient states that he is not able to get up from his wheelchair and is afraid of falling. Dr. Mireya Bello (foot specialist) placed right foot brace for the clubfoot and patient states that it is better. S +1 edema to the knees, scattered scabs bilateral knees. Compression hoes, elevation. PHYSICAL THERAPY    NIIDM - Patient is on Metformin 500 mg twice a day. Today he had diarrhea but states that it is not very often and states that is happens every once in a while. 12/20 Hg A1C 7.1, but today it is 7.6. He does not measure blood glucose. Advised to measure blood glucose, limit carbohydrate food. Will start patient on Tradjenta 5 mg and continue Metformin 500 mg once a day. Patient advised to call office if he notices hypoglycemia, nausea, vomiting. Essential Hypertension - Patient is on Ramipril and Metoprolol. Denies lightheadedness/dizziness. Blood pressure remains controlled. Depression: Last visit patient wanted to be off of Paroxetine. D/C Paroxetine. Review of Systems   Constitutional: Positive for activity change. Negative for appetite change and chills. Respiratory: Negative for chest tightness, shortness of breath and wheezing. Cardiovascular: Positive for leg swelling. Negative for chest pain. Gastrointestinal: Negative for abdominal pain, constipation, diarrhea, nausea and vomiting. Musculoskeletal: Positive for back pain, gait problem and myalgias. Muscle weakness   Neurological: Positive for weakness. Negative for dizziness, syncope, light-headedness, numbness and headaches. Physical Exam  Vitals signs and nursing note reviewed. Constitutional:       Appearance: He is obese. HENT:      Head: Normocephalic and atraumatic. Nose: Nose normal.      Mouth/Throat:      Mouth: Mucous membranes are moist.      Pharynx: Oropharynx is clear. Eyes:      Extraocular Movements: Extraocular movements intact.       Pupils: Pupils are equal, round, and

## 2021-03-23 ENCOUNTER — OFFICE VISIT (OUTPATIENT)
Dept: INTERNAL MEDICINE CLINIC | Age: 81
End: 2021-03-23
Payer: MEDICARE

## 2021-03-23 VITALS
TEMPERATURE: 97.3 F | HEART RATE: 80 BPM | DIASTOLIC BLOOD PRESSURE: 72 MMHG | SYSTOLIC BLOOD PRESSURE: 120 MMHG | BODY MASS INDEX: 45.25 KG/M2 | HEIGHT: 67 IN

## 2021-03-23 DIAGNOSIS — I10 ESSENTIAL HYPERTENSION: ICD-10-CM

## 2021-03-23 DIAGNOSIS — E11.9 TYPE 2 DIABETES MELLITUS WITHOUT COMPLICATION, WITHOUT LONG-TERM CURRENT USE OF INSULIN (HCC): ICD-10-CM

## 2021-03-23 DIAGNOSIS — M62.81 MUSCLE WEAKNESS: ICD-10-CM

## 2021-03-23 DIAGNOSIS — E11.65 TYPE 2 DIABETES MELLITUS WITH HYPERGLYCEMIA, WITHOUT LONG-TERM CURRENT USE OF INSULIN (HCC): ICD-10-CM

## 2021-03-23 DIAGNOSIS — F33.41 RECURRENT MAJOR DEPRESSIVE DISORDER, IN PARTIAL REMISSION (HCC): ICD-10-CM

## 2021-03-23 DIAGNOSIS — R29.898 WEAKNESS OF RIGHT HIP: ICD-10-CM

## 2021-03-23 DIAGNOSIS — R29.6 FREQUENT FALLS: Primary | ICD-10-CM

## 2021-03-23 LAB — HBA1C MFR BLD: 7.6 % (ref 4.3–5.7)

## 2021-03-23 PROCEDURE — G8484 FLU IMMUNIZE NO ADMIN: HCPCS | Performed by: STUDENT IN AN ORGANIZED HEALTH CARE EDUCATION/TRAINING PROGRAM

## 2021-03-23 PROCEDURE — 4040F PNEUMOC VAC/ADMIN/RCVD: CPT | Performed by: STUDENT IN AN ORGANIZED HEALTH CARE EDUCATION/TRAINING PROGRAM

## 2021-03-23 PROCEDURE — G8427 DOCREV CUR MEDS BY ELIG CLIN: HCPCS | Performed by: STUDENT IN AN ORGANIZED HEALTH CARE EDUCATION/TRAINING PROGRAM

## 2021-03-23 PROCEDURE — 83036 HEMOGLOBIN GLYCOSYLATED A1C: CPT | Performed by: STUDENT IN AN ORGANIZED HEALTH CARE EDUCATION/TRAINING PROGRAM

## 2021-03-23 PROCEDURE — 1123F ACP DISCUSS/DSCN MKR DOCD: CPT | Performed by: STUDENT IN AN ORGANIZED HEALTH CARE EDUCATION/TRAINING PROGRAM

## 2021-03-23 PROCEDURE — 99214 OFFICE O/P EST MOD 30 MIN: CPT | Performed by: STUDENT IN AN ORGANIZED HEALTH CARE EDUCATION/TRAINING PROGRAM

## 2021-03-23 PROCEDURE — G8417 CALC BMI ABV UP PARAM F/U: HCPCS | Performed by: STUDENT IN AN ORGANIZED HEALTH CARE EDUCATION/TRAINING PROGRAM

## 2021-03-23 PROCEDURE — 1036F TOBACCO NON-USER: CPT | Performed by: STUDENT IN AN ORGANIZED HEALTH CARE EDUCATION/TRAINING PROGRAM

## 2021-03-23 SDOH — ECONOMIC STABILITY: TRANSPORTATION INSECURITY
IN THE PAST 12 MONTHS, HAS LACK OF TRANSPORTATION KEPT YOU FROM MEETINGS, WORK, OR FROM GETTING THINGS NEEDED FOR DAILY LIVING?: NO

## 2021-03-24 ASSESSMENT — ENCOUNTER SYMPTOMS
BACK PAIN: 1
CHEST TIGHTNESS: 0
WHEEZING: 0
VOMITING: 0
DIARRHEA: 0
NAUSEA: 0
CONSTIPATION: 0
SHORTNESS OF BREATH: 0
ABDOMINAL PAIN: 0

## 2021-03-25 NOTE — TELEPHONE ENCOUNTER
Januvia is on patient's formulary . Patient informed of change from the [facility]

## 2021-04-07 ENCOUNTER — HOSPITAL ENCOUNTER (OUTPATIENT)
Dept: PHARMACY | Age: 81
Setting detail: THERAPIES SERIES
Discharge: HOME | End: 2021-04-07
Payer: MEDICARE

## 2021-04-07 DIAGNOSIS — I26.99 PULMONARY EMBOLISM, UNSPECIFIED CHRONICITY, UNSPECIFIED PULMONARY EMBOLISM TYPE, UNSPECIFIED WHETHER ACUTE COR PULMONALE PRESENT (HCC): ICD-10-CM

## 2021-04-07 DIAGNOSIS — Z79.01 ANTICOAGULATED ON COUMADIN: ICD-10-CM

## 2021-04-07 DIAGNOSIS — I48.19 PERSISTENT ATRIAL FIBRILLATION (HCC): ICD-10-CM

## 2021-04-07 DIAGNOSIS — Z51.81 ENCOUNTER FOR THERAPEUTIC DRUG MONITORING: ICD-10-CM

## 2021-04-07 LAB — POC INR: 2.3 (ref 0.8–1.2)

## 2021-04-07 PROCEDURE — 36416 COLLJ CAPILLARY BLOOD SPEC: CPT

## 2021-04-07 PROCEDURE — 99211 OFF/OP EST MAY X REQ PHY/QHP: CPT

## 2021-04-07 PROCEDURE — 85610 PROTHROMBIN TIME: CPT

## 2021-04-07 NOTE — PROGRESS NOTES
Medication Management [address]   [phone] (phone)  [phone] (fax)      Mr. Jeffery Branham is a 68 y.o.  male with history of recurrent PE/persistent atrial fib. , per Dr. Tena Villegas referral, who presents today for Warfarin monitoring and adjustment (6 week visit). Patient verifies current dosing regimen and tablet strength. No missed or extra doses. Patient denies bleeding/chest pain. Has bruises from the falls (none visible). Has usual SOB/swelling of legs. No blood in urine or stool. No dietary changes. Changes in medication/OTC agents/herbals: states Metformin was decreased to 1/day, and now on Januvia instead of Tradjenta. No change in alcohol use or tobacco use. No change in activity level. Patient denies headaches/dizziness/lightheadedness. Has fallen twice at home - did not hit head. To start home PT tomorrow (right club foot). Will be getting ramp built. No vomiting/diarrhea or acute illness. No procedures scheduled in the future at this time. Assessment:   Lab Results   Component Value Date    INR 2.30 (H) 04/07/2021    INR 3.00 (H) 02/24/2021    INR 3.50 (H) 01/13/2021    PROTIME 35.8 (H) 12/18/2017    PROTIME 21.7 (A) 08/14/2017    PROTIME 52.5 (H) 08/12/2017     INR therapeutic - goal 2-3. Recent Labs     04/07/21  1405   INR 2.30*     Plan:  POCT INR ordered/performed/result reviewed. Continue PO Coumadin 5 mg W, 7.5 mg MTThFSS. Recheck INR in 6 week(s). Patient reminded to call the Anticoagulation Clinic with any signs or symptoms of bleeding or with any medication changes. Patient given instructions utilizing the teach back method. Patient unable to transfer to transport chair (his wheelchair didn't have foot rest - had trouble keeping feet up). Discharged via wheelchair in no apparent distress, wearing mask, with wife. After visit summary printed and reviewed with wife.       Medications reviewed and updated on home medication list.    Influenza vaccine:     [] given    [] declined   [] received previously   [] plans to receive at a later time   [] refused    [] documented in Epic

## 2021-04-08 ENCOUNTER — TELEPHONE (OUTPATIENT)
Dept: INTERNAL MEDICINE CLINIC | Age: 81
End: 2021-04-08

## 2021-04-08 DIAGNOSIS — R29.898 RIGHT ARM WEAKNESS: Primary | ICD-10-CM

## 2021-04-08 DIAGNOSIS — M62.81 MUSCLE WEAKNESS: ICD-10-CM

## 2021-04-08 DIAGNOSIS — R29.898 WEAKNESS OF LEFT HIP: ICD-10-CM

## 2021-04-08 DIAGNOSIS — R29.6 FREQUENT FALLS: ICD-10-CM

## 2021-04-11 DIAGNOSIS — I48.19 PERSISTENT ATRIAL FIBRILLATION (HCC): ICD-10-CM

## 2021-04-11 DIAGNOSIS — M62.81 MUSCLE WEAKNESS: Primary | ICD-10-CM

## 2021-04-11 DIAGNOSIS — E78.00 PURE HYPERCHOLESTEROLEMIA: ICD-10-CM

## 2021-04-11 DIAGNOSIS — E11.65 TYPE 2 DIABETES MELLITUS WITH HYPERGLYCEMIA, WITHOUT LONG-TERM CURRENT USE OF INSULIN (HCC): ICD-10-CM

## 2021-04-11 DIAGNOSIS — I10 ESSENTIAL HYPERTENSION: ICD-10-CM

## 2021-04-12 ENCOUNTER — TELEPHONE (OUTPATIENT)
Dept: INTERNAL MEDICINE CLINIC | Age: 81
End: 2021-04-12

## 2021-04-12 NOTE — TELEPHONE ENCOUNTER
Referral ordered and DME ordered for wheelchair and shower bench set . Physical therapist notified. Faxed DME orders to [address] [place] [address] at [phone]. Patient was notified that orders were faxed to the medical supply .

## 2021-04-12 NOTE — TELEPHONE ENCOUNTER
Spoke with home health and patient is only getting the PT and OT , no skilled nursing so labs cannot be drawn unless patient requires skilled nursing for something other then lab draw . Spoke with patient's wife and she states that she cannot get patient out for labs . Please advise.

## 2021-04-12 NOTE — TELEPHONE ENCOUNTER
----- Message from Britni Fang MD sent at 4/11/2021  1:19 PM EDT -----  Called patient and he can't get out of house to get x-ray or labs. Please print CK I ordered at visit 3/2021 and get labs I ordered today off printer and fax to his home health nurse and see if she can draw them.

## 2021-04-19 ENCOUNTER — CARE COORDINATION (OUTPATIENT)
Dept: CARE COORDINATION | Age: 81
End: 2021-04-19

## 2021-04-19 ENCOUNTER — TELEPHONE (OUTPATIENT)
Dept: INTERNAL MEDICINE CLINIC | Age: 81
End: 2021-04-19

## 2021-04-19 RX ORDER — PAROXETINE HYDROCHLORIDE 40 MG/1
40 TABLET, FILM COATED ORAL EVERY MORNING
COMMUNITY
End: 2021-04-19 | Stop reason: SDUPTHER

## 2021-04-19 SDOH — SOCIAL STABILITY: SOCIAL NETWORK: HOW OFTEN DO YOU ATTEND CHURCH OR RELIGIOUS SERVICES?: MORE THAN 4 TIMES PER YEAR

## 2021-04-19 SDOH — SOCIAL STABILITY: SOCIAL NETWORK: HOW OFTEN DO YOU ATTENT MEETINGS OF THE CLUB OR ORGANIZATION YOU BELONG TO?: NEVER

## 2021-04-19 SDOH — SOCIAL STABILITY: SOCIAL NETWORK
IN A TYPICAL WEEK, HOW MANY TIMES DO YOU TALK ON THE PHONE WITH FAMILY, FRIENDS, OR NEIGHBORS?: MORE THAN THREE TIMES A WEEK

## 2021-04-19 SDOH — SOCIAL STABILITY: SOCIAL NETWORK: ARE YOU MARRIED, WIDOWED, DIVORCED, SEPARATED, NEVER MARRIED, OR LIVING WITH A PARTNER?: MARRIED

## 2021-04-19 NOTE — CARE COORDINATION
Ambulatory Care Coordination Note  4/19/2021  CM Risk Score: 1  Charlson 10 Year Mortality Risk Score: 79%     ACC: Gianluca Bennett, NIMO    Summary Note: Bob Zavala was referred to care coordination by his PCP for assistance in getting additional services and support in the home. Spoke with Bob Zavala and wife Banner ([facility]) today. Introduced self and role. Agreeable to f/u calls. H/o afib, DM, depression, HTN, congenital deformity of right foot. Pt was seen in March by PCP. Pt's wife shared that over last 6-8 wks pt had started falling more. Was walking with walker until recently and now is unable to walk at all other than to pivot. Wife reports that pt denies any pain.  PT/OT were ordered. Pt receiving services through [facility]. Wife states that today [facility] nurse came out and admitted him in addition to drawing his blood. Wife shares that this has been a challenge recently as she is unable to get him out of the home. Wife has macular degeneration and does not drive. Xrays were ordered by PCP but pt unable to get out of the home to get them. Does not have a ramp-therefore [facility] unable to transport as wife cannot safely get him out of the home. Wife states that her son and son-in-law are getting the material and are going to build one in the upcoming wks. Pt has WC, tub-transfer bench, lift chair, and BSC. Pt uses urinal and BSC as he is unable to get in the BR. Sleeping in recliner chair. Wife has been assisting pt with getting washed up. Pt is retired -will have SW look in to benefits as pt may qualify for long term [facility] services. DM: A1C 7.6. wife reports pt refuses to check BS's. On Januvia and Metformin XR. Has ble edema-takes Bumex PRN. Typically takes a few times per wk per wife. Wife reports that pt is good about elevating lower ext's when edema is present. Pt is on coumadin-follows with coumadin clinic. [facility] nurse will draw next INR.    Podiatry-Dr. Arden Vu  Eye- [address]. of care:  Continue working with Wyatt Aponte, PT/OT  Family to build ramp-utilize [facility] services for [facility] transport once ramp installed. SW referral-will look in to seeing about pt's veterans benefits regarding long term [place] services   to mail DM zone tool sheet and right place right time sheet. Blood work obtained by [place] nurse today  Once ramp installed pt can get out to get xrays completed. Call if needing any additional HME. Reviewed safety precautions  Diabetes Assessment    Medic Alert ID: No  How often do you test your blood sugar?: No Testing   Do you have barriers with adherence to non-pharmacologic self-management interventions? (Nutrition/Exercise/Self-Monitoring): No   Have you ever had to go to the ED for symptoms of low blood sugar?: No       Do you have hyperglycemia symptoms?: No   Do you have hypoglycemia symptoms?: No   Blood Sugar Monitoring Regimen: Not Testing   Blood Sugar Trends: No Change                  Ambulatory Care Coordination Assessment    Care Coordination Protocol  Program Enrollment: Complex Care  Referral from Primary Care Provider: Yes  Week 1 - Initial Assessment     Do you have all of your prescriptions and are they filled?: Yes  Barriers to medication adherence: None  Are you able to afford your medications?: Yes  How often do you have trouble taking your medications the way you have been told to take them?: I always take them as prescribed. Do you have Home O2 Therapy?: No      Ability to seek help/take action for Emergent Urgent situations i.e. fire, crime, inclement weather or health crisis.: Needs Assistance  Ability to ambulate to restroom: Needs Assistance  Ability handle personal hygeine needs (bathing/dressing/grooming): Needs Assistance  Ability to manage Medications:  Independent  Ability to prepare Food Preparation: Needs Assistance  Ability to maintain home (clean home, laundry): Dependent  Ability to drive and/or has transportation:

## 2021-04-19 NOTE — TELEPHONE ENCOUNTER
Per verbal order Dr. Gallo Bolus okay to increase Paxil to 40 mg daily . Make sure he is taking daily and not missing doses as it is short acting .

## 2021-04-19 NOTE — CARE COORDINATION
Called and spoke with pt and his wife a long time ago. He stated \"I have \" for life, I don't need to do anything. \" Informed pt that we want to provide him [facility] services after [address] pulls out, and we are trying to figure out what we need to do. Pt voiced understanding. I will contact [facility].

## 2021-04-19 NOTE — TELEPHONE ENCOUNTER
Spoke with patient and he is not missing doses of the Paxil . Instructed patient that Dr. Allyn Mazariegos is okay with him increasing the Paxil to 40 mg daily . He can take 2 of the 20 mg daily and I will send a new script to [facility] for the 40 mg daily . Patient verbalized understanding .

## 2021-04-19 NOTE — CARE COORDINATION
Nurse referral to find [facility] options for pt. [facility] and spoke with Ronaldo. She informed me that pt is not \"in the system locally\" and provided me the # to call in [employer]. For the [employer]. [phone] stating\" they are very helpful. \"

## 2021-04-19 NOTE — CARE COORDINATION
Called and spoke with Juliet Olmedo at [facility] at [phone]. Juliet Olmedo said since pt has [employer] for [employer] will give him programs that benefit him the most.\"  I inquired if that will be [facility] when he neds it and he said yes, just call [employer] at [phone] to request it. I will inform Nurse Olya about this.

## 2021-04-20 RX ORDER — PAROXETINE HYDROCHLORIDE 40 MG/1
40 TABLET, FILM COATED ORAL EVERY MORNING
Qty: 90 TABLET | Refills: 1 | Status: SHIPPED | OUTPATIENT
Start: 2021-04-20

## 2021-04-21 ENCOUNTER — CARE COORDINATION (OUTPATIENT)
Dept: CARE COORDINATION | Age: 81
End: 2021-04-21

## 2021-04-27 ENCOUNTER — CARE COORDINATION (OUTPATIENT)
Dept: CARE COORDINATION | Age: 81
End: 2021-04-27

## 2021-04-28 ENCOUNTER — TELEPHONE (OUTPATIENT)
Dept: INTERNAL MEDICINE CLINIC | Age: 81
End: 2021-04-28

## 2021-04-28 NOTE — TELEPHONE ENCOUNTER
Physical therapist called to report that patient fell yesterday getting out of his recliner to wheelchair . Patient told therapist that his knees buckled and he slide down to the floor . No injuries noted from fall . The squad came to the patient's house and got him back up to the wheelchair . Therapist states that patient had no complaints from the fall today at the visit .

## 2021-04-29 ENCOUNTER — TELEPHONE (OUTPATIENT)
Dept: INTERNAL MEDICINE CLINIC | Age: 81
End: 2021-04-29

## 2021-04-29 NOTE — TELEPHONE ENCOUNTER
We have discussed NH placement due to frequent falls but patient refuses. At this point I am trying to get him to get as much strengthening with PT as possible as this is what he will allow.

## 2021-05-07 ENCOUNTER — CARE COORDINATION (OUTPATIENT)
Dept: CARE COORDINATION | Age: 81
End: 2021-05-07

## 2021-05-07 NOTE — CARE COORDINATION
Ambulatory Care Coordination Note  5/7/2021  CM Risk Score: 1  Charlson 10 Year Mortality Risk Score: 79%     ACC: Sandra Cárdenas RN    Summary Note: Brittany Watkins is being followed by care coordination for education and assistance in managing his chronic conditions. Spoke with wife Chidi Cristobal today for f/u. Limited mobility-family obtained ramp so pt can get out now. Wife arranged appt with ortho-DR. Reyes 5/18. Currently receiving  nsg, PT/OT. Physical therapy working to help with transfers. Pt had fall last wk. No injuries with fall. PT notified PCP. Plan of care: reviewed safety precautions. Use gait belt when transferring  F/u with ortho on 5/18-[facility] transporting to appt. Wife will be attending with pt. Continue working with [facility] nsg, PT/OT  Call with any questions or concerns. Diabetes Assessment    Medic Alert ID: No  How often do you test your blood sugar?: No Testing   Do you have barriers with adherence to non-pharmacologic self-management interventions? (Nutrition/Exercise/Self-Monitoring): No   Have you ever had to go to the ED for symptoms of low blood sugar?: No       Do you have hyperglycemia symptoms?: No   Do you have hypoglycemia symptoms?: No   Blood Sugar Monitoring Regimen: Not Testing       and   General Assessment                   Care Coordination Interventions    Program Enrollment: Complex Care  Referral from Primary Care Provider: Yes  Suggested Interventions and [address] [place] [address] Hwy: Completed (Comment: [facility] nursing started 4/19)  Medi Set or Pill Pack: Declined  Occupational Therapy: Completed (Comment: [facility] started week of 4/12)  Physical Therapy: Completed (Comment: [facility] started week of 4/12)  Transportation Support: Not Started  Zone Management Tools: In Process (Comment: DM zone mgmt )  Other Services or Interventions: has ramp         Goals Addressed    None         Prior to Admission medications    Medication Sig Start Date End Date Taking?  Authorizing Provider   PARoxetine (PAXIL) 40 MG tablet Take 1 tablet by mouth every morning New dose. 4/20/21   Kirstie Mortensen MD   SITagliptin (JANUVIA) 100 MG tablet Take 1 tablet by mouth daily Discontinue script for Tradjenta 3/25/21   Kirstie Mortensen MD   bumetanide (BUMEX) 1 MG tablet TAKE 1 TABLET DAILY AS NEEDED (FLUID OVERLOAD, WEIGHT GAIN, SHORTNESS OF BREATH) 3/10/21   Kirstie Mortensen MD   acetaminophen (TYLENOL) 500 MG tablet Take 500 mg by mouth every 6 hours as needed for Pain or Fever Don't take more than 3,000 mg each day, including what's in Tylenol P.M. Historical Provider, MD   diphenhydrAMINE-APAP, sleep, (TYLENOL PM EXTRA STRENGTH)  MG tablet Take 2 tablets by mouth nightly as needed for Sleep    Historical Provider, MD   dutasteride (AVODART) 0.5 MG capsule TAKE 1 CAPSULE DAILY 1/3/21   Kirstie Mortensen MD   ezetimibe-simvastatin (VYTORIN) 10-40 MG per tablet TAKE 1 TABLET NIGHTLY 1/3/21 7/2/21  Kirstie Mortensen MD   ramipril (ALTACE) 10 MG capsule TAKE 1 CAPSULE DAILY 12/15/20   Ainsley Gomez MD   metoprolol succinate (TOPROL XL) 100 MG extended release tablet TAKE 1 TABLET DAILY 12/15/20   Ainsley Gomez MD   metFORMIN (GLUCOPHAGE-XR) 500 MG extended release tablet TAKE 2 TABLETS DAILY WITH BREAKFAST  Patient taking differently: TAKE 1 TABLETS DAILY WITH BREAKFAST 12/15/20   Ainsley Gomez MD   warfarin (COUMADIN) 5 MG tablet Take as directed by [facility].   (135 tabs = 90 day supply) 6/2/20   Teodoro Mart MD   [facility] Natural Products (OSTEO BI-FLEX TRIPLE STRENGTH) TABS Take 2 tablets by mouth daily Indications: Knee Pain    Historical Provider, MD   dutasteride (AVODART) 0.5 MG capsule TAKE 1 CAPSULE DAILY 10/8/19   Kirstie Mortensen MD   ezetimibe-simvastatin (VYTORIN) 10-40 MG per tablet TAKE 1 TABLET NIGHTLY 10/7/19   Maceo Drain, APRN - CNP   Multiple Vitamins-Minerals (PRESERVISION AREDS 2 PO) Take by mouth daily    Historical Provider, MD   fluticasone (FLONASE) 50 MCG/ACT nasal spray USE 2 SPRAYS NASALLY DAILY  Patient taking differently: as needed  12/12/18   Allyssa Carlton MD   aspirin EC 81 MG EC tablet Take 81 mg by mouth daily. Blood thinner; with food. Historical Provider, MD   OLIVE LEAF EXTRACT PO Take 4 tablets by mouth daily as needed supplement    Historical Provider, MD   Cholecalciferol (VITAMIN D-3) 5000 UNIT TABS Take  by mouth daily.  Indications: Treatment with Vitamin D    Historical Provider, MD       Future Appointments   Date Time Provider Roderick Fernando   5/19/2021 11:00 AM Davonna Hamman, RN [facility]   6/15/2021  2:30 PM Valarie Hampton MD [facility]   9/28/2021  2:30 PM Valarie Hampton MD [address] [facility]

## 2021-05-19 ENCOUNTER — APPOINTMENT (OUTPATIENT)
Dept: PHARMACY | Age: 81
End: 2021-05-19
Payer: MEDICARE

## 2021-05-20 ENCOUNTER — CARE COORDINATION (OUTPATIENT)
Dept: CARE COORDINATION | Age: 81
End: 2021-05-20

## 2021-05-20 ENCOUNTER — HOSPITAL ENCOUNTER (OUTPATIENT)
Dept: PHARMACY | Age: 81
Setting detail: THERAPIES SERIES
Discharge: HOME | End: 2021-05-20
Payer: MEDICARE

## 2021-05-20 DIAGNOSIS — I26.99 PULMONARY EMBOLISM, UNSPECIFIED CHRONICITY, UNSPECIFIED PULMONARY EMBOLISM TYPE, UNSPECIFIED WHETHER ACUTE COR PULMONALE PRESENT (HCC): ICD-10-CM

## 2021-05-20 DIAGNOSIS — Z51.81 ENCOUNTER FOR THERAPEUTIC DRUG MONITORING: ICD-10-CM

## 2021-05-20 DIAGNOSIS — Z79.01 ANTICOAGULATED ON COUMADIN: ICD-10-CM

## 2021-05-20 DIAGNOSIS — I48.19 PERSISTENT ATRIAL FIBRILLATION (HCC): Primary | ICD-10-CM

## 2021-05-20 PROCEDURE — 99211 OFF/OP EST MAY X REQ PHY/QHP: CPT | Performed by: PHARMACIST

## 2021-05-20 NOTE — PROGRESS NOTES
Medication Management [address]  [phone] (phone)  [phone] (fax)        INR drawn by [facility]. Nursing assessment reviewed and appreciated. Nursing assessment within the normal limits with the exception of the following:  +SOB    Anticoagulation encounter completed via telephone. Mr. Beryle Ro. is a 68 y.o.  male with history of PE, Afib. Patient verifies current dosing regimen and tablet strength. No missed or extra doses. Patient denies s/s bleeding/bruising/swelling/SOB/chest pain  No blood in stool. Did have blood in urine, started yesterday. Called to report to [facility], spoke to Virgilio. No dietary changes. No changes in medication/OTC agents/Herbals. States that had steroid shots in his knee yesterday. No change in alcohol use or tobacco use. No change in activity level. Patient denies headaches/dizziness/lightheadedness. Hx of frequent falls, states that he hasn't fallen in approx 3 weeks. No vomiting/diarrhea or acute illness. No Procedures scheduled in the future at this time. Assessment:  Lab Results   Component Value Date    INR 3.90 (H) 05/19/2021    INR 2.30 (H) 04/07/2021    INR 3.00 (H) 02/24/2021    PROTIME 35.8 (H) 12/18/2017    PROTIME 21.7 (A) 08/14/2017    PROTIME 52.5 (H) 08/12/2017     INR supratherapeutic   Recent Labs     05/19/21  1243   INR 3.90*         Plan:  Hold Coumadin today, then continue Coumadin 5mg W and 7.5mg [id number]. Recheck INR in 1 week(s), on 5/26/21 via [facility]. Patient reminded to call the Anticoagulation Clinic with any signs or symptoms of bleeding or with any medication changes. Patient given instructions utilizing the teach back method.         The following statement was review with patient regarding this virtual visit:  We want to confirm that, for purposes of billing, this is a virtual visit with your provider for which we will submit a claim for reimbursement with your insurance company. You may be responsible for any copays, coinsurance amounts or other amounts not covered by your insurance company. If you do not accept this, unfortunately we will not be able to schedule a virtual visit with the provider. Do you accept?   Yes      For Pharmacy Admin Tracking Only     Intervention Detail: Dose Adjustment: 1: reason: Therapy Optimization   Total # of Interventions Recommended: 1   Total # of Interventions Accepted: 1   Time Spent (min): 20

## 2021-05-20 NOTE — CARE COORDINATION
Ambulatory Care Coordination Note  5/20/2021  CM Risk Score: 1  Charlson 10 Year Mortality Risk Score: 79%     ACC: Verona Villalba, RN    Summary Note: Andreina Childs is being followed by care coordination for education and assistance in managing his chronic conditions and assisting in getting connected with community resources. Spoke with wife Betty Peterson today for f/u. Limited mobility-had f/u with Dr. Hetal Rucker 5/18. Was given steroid inj in bilat knees. Wife feels he is standing better since getting them. Pt feels so as well. Wife reports plan is to do Synvisc inj. Waiting for ins approval. Office will call to schedule if approved. Was also given script to get knew knee brace at [facility]. Continues to work with [facility] nsg/PT. Still primarily just pivoting from chair to chair. No falls since last outreach. Pt received motorized WC from [place]. Family installing sides on the ramp so pt can safely get out when using motorized WC. Provided wife with  number to inquire about long term [facility] services through [place].  [phone]  Plan of care:  F/u with  re: HH benefits. F/u with [facility] re: synvisc inj next wk  Utilize motorized WC when out  Continue using [facility] for transportation  Continue working with [facility]  Reviewed safety precautions  Call with new concerns  Diabetes Assessment    Medic Alert ID: No  How often do you test your blood sugar?: No Testing   Do you have barriers with adherence to non-pharmacologic self-management interventions? (Nutrition/Exercise/Self-Monitoring): No   Have you ever had to go to the ED for symptoms of low blood sugar?: No           and   General Assessment    Do you have any symptoms that are causing concern?: Yes  Progression since Onset: Gradually Improving  Reported Symptoms: Other (Comment: limited mobility. received steroid inj. pivoting from chair to chair. brace ordered that pt will be getting.  no recent falls since last outreach)                 Care Coordination Interventions    Program Enrollment: Complex Care  Referral from Primary Care Provider: Yes  Suggested Interventions and [address]: Completed (Comment: [facility] nursing started 4/19)  Medi Set or Pill Pack: Declined  Occupational Therapy: Completed (Comment: [facility] started week of 4/12)  Physical Therapy: Completed (Comment: [facility] started week of 4/12)  Transportation Support: Not Started  Zone Management Tools: Completed (Comment: DM zone mgmt )  Other Services or Interventions: has ramp         Goals Addressed    None         Prior to Admission medications    Medication Sig Start Date End Date Taking? Authorizing Provider   PARoxetine (PAXIL) 40 MG tablet Take 1 tablet by mouth every morning New dose. 4/20/21   Shelbi Luke MD   SITagliptin (JANUVIA) 100 MG tablet Take 1 tablet by mouth daily Discontinue script for Tradjenta 3/25/21   Shelbi Luke MD   bumetanide (BUMEX) 1 MG tablet TAKE 1 TABLET DAILY AS NEEDED (FLUID OVERLOAD, WEIGHT GAIN, SHORTNESS OF BREATH) 3/10/21   Shelbi Luke MD   acetaminophen (TYLENOL) 500 MG tablet Take 500 mg by mouth every 6 hours as needed for Pain or Fever Don't take more than 3,000 mg each day, including what's in Tylenol P.M.     Historical Provider, MD   diphenhydrAMINE-APAP, sleep, (TYLENOL PM EXTRA STRENGTH)  MG tablet Take 2 tablets by mouth nightly as needed for Sleep    Historical Provider, MD   dutasteride (AVODART) 0.5 MG capsule TAKE 1 CAPSULE DAILY 1/3/21   Shelbi Luke MD   ezetimibe-simvastatin (VYTORIN) 10-40 MG per tablet TAKE 1 TABLET NIGHTLY 1/3/21 7/2/21  Shelbi Luke MD   ramipril (ALTACE) 10 MG capsule TAKE 1 CAPSULE DAILY 12/15/20   Amrita Monsalve MD   metoprolol succinate (TOPROL XL) 100 MG extended release tablet TAKE 1 TABLET DAILY 12/15/20   Amrita Monsalve MD   metFORMIN (GLUCOPHAGE-XR) 500 MG extended release tablet TAKE 2 TABLETS DAILY WITH BREAKFAST  Patient taking differently: TAKE 1 TABLETS DAILY WITH BREAKFAST 12/15/20   Beryl Nicholson MD   warfarin (COUMADIN) 5 MG tablet Take as directed by [facility]. (135 tabs = 90 day supply) 6/2/20   Yazmin Saenz MD   [facility] Natural Products (OSTEO BI-FLEX TRIPLE STRENGTH) TABS Take 2 tablets by mouth daily Indications: Knee Pain    Historical Provider, MD   dutasteride (AVODART) 0.5 MG capsule TAKE 1 CAPSULE DAILY 10/8/19   Clarence Manuel MD   ezetimibe-simvastatin (VYTORIN) 10-40 MG per tablet TAKE 1 TABLET NIGHTLY 10/7/19   Skyler Hassan APRN - CNP   Multiple Vitamins-Minerals (PRESERVISION AREDS 2 PO) Take by mouth daily    Historical Provider, MD   fluticasone (FLONASE) 50 MCG/ACT nasal spray USE 2 SPRAYS NASALLY DAILY  Patient taking differently: as needed  12/12/18   Clarence Manuel MD   aspirin EC 81 MG EC tablet Take 81 mg by mouth daily. Blood thinner; with food. Historical Provider, MD   OLIVE LEAF EXTRACT PO Take 4 tablets by mouth daily as needed supplement    Historical Provider, MD   Cholecalciferol (VITAMIN D-3) 5000 UNIT TABS Take  by mouth daily.  Indications: Treatment with Vitamin D    Historical Provider, MD       Future Appointments   Date Time Provider Roderick Fernando   6/15/2021  2:30 PM Beryl Nicholson MD [facility] [address] [facility]   9/28/2021  2:30 PM Beryl Nicholson MD [address] [facility]

## 2021-05-22 ENCOUNTER — APPOINTMENT (OUTPATIENT)
Dept: CT IMAGING | Age: 81
DRG: 663 | End: 2021-05-22
Payer: MEDICARE

## 2021-05-22 ENCOUNTER — HOSPITAL ENCOUNTER (INPATIENT)
Age: 81
LOS: 6 days | Discharge: NURSING, REHAB OR LONG-TERM CARE | DRG: 663 | End: 2021-05-28
Attending: EMERGENCY MEDICINE | Admitting: FAMILY MEDICINE
Payer: MEDICARE

## 2021-05-22 ENCOUNTER — TELEPHONE (OUTPATIENT)
Dept: INTERNAL MEDICINE CLINIC | Age: 81
End: 2021-05-22

## 2021-05-22 DIAGNOSIS — R31.0 GROSS HEMATURIA: ICD-10-CM

## 2021-05-22 DIAGNOSIS — N30.01 ACUTE CYSTITIS WITH HEMATURIA: Primary | ICD-10-CM

## 2021-05-22 DIAGNOSIS — R31.9 URINARY TRACT INFECTION WITH HEMATURIA, SITE UNSPECIFIED: ICD-10-CM

## 2021-05-22 DIAGNOSIS — N39.0 URINARY TRACT INFECTION WITH HEMATURIA, SITE UNSPECIFIED: ICD-10-CM

## 2021-05-22 DIAGNOSIS — T14.8XXA HEMATOMA: ICD-10-CM

## 2021-05-22 DIAGNOSIS — N39.0 COMPLICATED URINARY TRACT INFECTION: ICD-10-CM

## 2021-05-22 PROBLEM — N21.0: Status: ACTIVE | Noted: 2021-05-22

## 2021-05-22 LAB
ALBUMIN SERPL-MCNC: 3 G/DL (ref 3.5–5.1)
ALP BLD-CCNC: 69 U/L (ref 38–126)
ALT SERPL-CCNC: 15 U/L (ref 11–66)
AMORPHOUS: ABNORMAL
AMPHETAMINE+METHAMPHETAMINE URINE SCREEN: NEGATIVE
ANION GAP SERPL CALCULATED.3IONS-SCNC: 11 MEQ/L (ref 8–16)
APTT: 28.3 SECONDS (ref 22–38)
AST SERPL-CCNC: 17 U/L (ref 5–40)
BACTERIA: ABNORMAL
BARBITURATE QUANTITATIVE URINE: NEGATIVE
BASOPHILS # BLD: 0.2 %
BASOPHILS ABSOLUTE: 0 THOU/MM3 (ref 0–0.1)
BENZODIAZEPINE QUANTITATIVE URINE: NEGATIVE
BILIRUB SERPL-MCNC: 0.6 MG/DL (ref 0.3–1.2)
BILIRUBIN DIRECT: < 0.2 MG/DL (ref 0–0.3)
BILIRUBIN URINE: ABNORMAL
BLOOD, URINE: ABNORMAL
BUN BLDV-MCNC: 28 MG/DL (ref 7–22)
CALCIUM SERPL-MCNC: 9.1 MG/DL (ref 8.5–10.5)
CANNABINOID QUANTITATIVE URINE: NEGATIVE
CASTS: ABNORMAL /LPF
CHARACTER, URINE: ABNORMAL
CHLORIDE BLD-SCNC: 103 MEQ/L (ref 98–111)
CO2: 26 MEQ/L (ref 23–33)
COCAINE METABOLITE QUANTITATIVE URINE: NEGATIVE
COLOR: ABNORMAL
CREAT SERPL-MCNC: 0.7 MG/DL (ref 0.4–1.2)
CRYSTALS: ABNORMAL
EKG Q-T INTERVAL: 332 MS
EKG QRS DURATION: 86 MS
EKG QTC CALCULATION (BAZETT): 449 MS
EKG R AXIS: -27 DEGREES
EKG T AXIS: -21 DEGREES
EKG VENTRICULAR RATE: 110 BPM
EOSINOPHIL # BLD: 1 %
EOSINOPHILS ABSOLUTE: 0.1 THOU/MM3 (ref 0–0.4)
EPITHELIAL CELLS, UA: ABNORMAL /HPF
ERYTHROCYTE [DISTWIDTH] IN BLOOD BY AUTOMATED COUNT: 14.2 % (ref 11.5–14.5)
ERYTHROCYTE [DISTWIDTH] IN BLOOD BY AUTOMATED COUNT: 55.2 FL (ref 35–45)
GFR SERPL CREATININE-BSD FRML MDRD: > 90 ML/MIN/1.73M2
GLUCOSE BLD-MCNC: 225 MG/DL (ref 70–108)
GLUCOSE, URINE: ABNORMAL MG/DL
HCT VFR BLD CALC: 36.9 % (ref 42–52)
HEMOGLOBIN: 11.1 GM/DL (ref 14–18)
ICTOTEST: NEGATIVE
IMMATURE GRANS (ABS): 0.06 THOU/MM3 (ref 0–0.07)
IMMATURE GRANULOCYTES: 0.5 %
INR BLD: 1.81 (ref 0.85–1.13)
KETONES, URINE: ABNORMAL
LACTIC ACID, SEPSIS: 1.7 MMOL/L (ref 0.5–1.9)
LACTIC ACID: 1.8 MMOL/L (ref 0.5–2)
LEUKOCYTE ESTERASE, URINE: ABNORMAL
LIPASE: 14.7 U/L (ref 5.6–51.3)
LYMPHOCYTES # BLD: 6.3 %
LYMPHOCYTES ABSOLUTE: 0.8 THOU/MM3 (ref 1–4.8)
MAGNESIUM: 2 MG/DL (ref 1.6–2.4)
MCH RBC QN AUTO: 32.4 PG (ref 26–33)
MCHC RBC AUTO-ENTMCNC: 30.1 GM/DL (ref 32.2–35.5)
MCV RBC AUTO: 107.6 FL (ref 80–94)
MISCELLANEOUS LAB TEST RESULT: ABNORMAL
MONOCYTES # BLD: 6.4 %
MONOCYTES ABSOLUTE: 0.8 THOU/MM3 (ref 0.4–1.3)
NITRITE, URINE: ABNORMAL
NUCLEATED RED BLOOD CELLS: 0 /100 WBC
OPIATES, URINE: NEGATIVE
OSMOLALITY CALCULATION: 291.9 MOSMOL/KG (ref 275–300)
OXYCODONE: NEGATIVE
PH UA: ABNORMAL (ref 5–9)
PHENCYCLIDINE QUANTITATIVE URINE: NEGATIVE
PLATELET # BLD: 359 THOU/MM3 (ref 130–400)
PMV BLD AUTO: 8.9 FL (ref 9.4–12.4)
POTASSIUM SERPL-SCNC: 4.1 MEQ/L (ref 3.5–5.2)
PRO-BNP: 636.3 PG/ML (ref 0–1800)
PROTEIN UA: ABNORMAL MG/DL
RBC # BLD: 3.43 MILL/MM3 (ref 4.7–6.1)
RBC URINE: ABNORMAL /HPF
RENAL EPITHELIAL, UA: ABNORMAL
SEG NEUTROPHILS: 85.6 %
SEGMENTED NEUTROPHILS ABSOLUTE COUNT: 10.6 THOU/MM3 (ref 1.8–7.7)
SODIUM BLD-SCNC: 140 MEQ/L (ref 135–145)
SPECIFIC GRAVITY UA: ABNORMAL (ref 1–1.03)
TOTAL CK: 103 U/L (ref 55–170)
TOTAL PROTEIN: 6.5 G/DL (ref 6.1–8)
TROPONIN T: < 0.01 NG/ML
UROBILINOGEN, URINE: ABNORMAL EU/DL (ref 0–1)
WBC # BLD: 12.4 THOU/MM3 (ref 4.8–10.8)
WBC UA: ABNORMAL /HPF
YEAST: ABNORMAL

## 2021-05-22 PROCEDURE — 1200000000 HC SEMI PRIVATE

## 2021-05-22 PROCEDURE — 6360000002 HC RX W HCPCS: Performed by: EMERGENCY MEDICINE

## 2021-05-22 PROCEDURE — 96365 THER/PROPH/DIAG IV INF INIT: CPT

## 2021-05-22 PROCEDURE — 83735 ASSAY OF MAGNESIUM: CPT

## 2021-05-22 PROCEDURE — 83880 ASSAY OF NATRIURETIC PEPTIDE: CPT

## 2021-05-22 PROCEDURE — 83690 ASSAY OF LIPASE: CPT

## 2021-05-22 PROCEDURE — 36415 COLL VENOUS BLD VENIPUNCTURE: CPT

## 2021-05-22 PROCEDURE — 51798 US URINE CAPACITY MEASURE: CPT

## 2021-05-22 PROCEDURE — 93005 ELECTROCARDIOGRAM TRACING: CPT | Performed by: EMERGENCY MEDICINE

## 2021-05-22 PROCEDURE — 93010 ELECTROCARDIOGRAM REPORT: CPT | Performed by: INTERNAL MEDICINE

## 2021-05-22 PROCEDURE — 87086 URINE CULTURE/COLONY COUNT: CPT

## 2021-05-22 PROCEDURE — 99223 1ST HOSP IP/OBS HIGH 75: CPT | Performed by: FAMILY MEDICINE

## 2021-05-22 PROCEDURE — 81001 URINALYSIS AUTO W/SCOPE: CPT

## 2021-05-22 PROCEDURE — 80053 COMPREHEN METABOLIC PANEL: CPT

## 2021-05-22 PROCEDURE — 84484 ASSAY OF TROPONIN QUANT: CPT

## 2021-05-22 PROCEDURE — 82550 ASSAY OF CK (CPK): CPT

## 2021-05-22 PROCEDURE — 85730 THROMBOPLASTIN TIME PARTIAL: CPT

## 2021-05-22 PROCEDURE — 99285 EMERGENCY DEPT VISIT HI MDM: CPT

## 2021-05-22 PROCEDURE — 87040 BLOOD CULTURE FOR BACTERIA: CPT

## 2021-05-22 PROCEDURE — 74176 CT ABD & PELVIS W/O CONTRAST: CPT

## 2021-05-22 PROCEDURE — 83605 ASSAY OF LACTIC ACID: CPT

## 2021-05-22 PROCEDURE — 85025 COMPLETE CBC W/AUTO DIFF WBC: CPT

## 2021-05-22 PROCEDURE — 2580000003 HC RX 258: Performed by: EMERGENCY MEDICINE

## 2021-05-22 PROCEDURE — 1200000003 HC TELEMETRY R&B

## 2021-05-22 PROCEDURE — 87077 CULTURE AEROBIC IDENTIFY: CPT

## 2021-05-22 PROCEDURE — 85610 PROTHROMBIN TIME: CPT

## 2021-05-22 PROCEDURE — 82248 BILIRUBIN DIRECT: CPT

## 2021-05-22 PROCEDURE — 96366 THER/PROPH/DIAG IV INF ADDON: CPT

## 2021-05-22 PROCEDURE — 80307 DRUG TEST PRSMV CHEM ANLYZR: CPT

## 2021-05-22 RX ORDER — NITROFURANTOIN MACROCRYSTALS 100 MG/1
100 CAPSULE ORAL 4 TIMES DAILY
Qty: 20 CAPSULE | Refills: 0 | Status: ON HOLD | OUTPATIENT
Start: 2021-05-22 | End: 2021-05-28 | Stop reason: HOSPADM

## 2021-05-22 RX ORDER — SODIUM CHLORIDE 9 MG/ML
1000 INJECTION, SOLUTION INTRAVENOUS CONTINUOUS
Status: DISCONTINUED | OUTPATIENT
Start: 2021-05-22 | End: 2021-05-23 | Stop reason: SDUPTHER

## 2021-05-22 RX ADMIN — SODIUM CHLORIDE 1000 ML: 9 INJECTION, SOLUTION INTRAVENOUS at 22:00

## 2021-05-22 RX ADMIN — CEFTRIAXONE SODIUM 1000 MG: 1 INJECTION, POWDER, FOR SOLUTION INTRAMUSCULAR; INTRAVENOUS at 22:01

## 2021-05-22 ASSESSMENT — PAIN SCALES - GENERAL
PAINLEVEL_OUTOF10: 0
PAINLEVEL_OUTOF10: 0
PAINLEVEL_OUTOF10: 5

## 2021-05-22 ASSESSMENT — PAIN DESCRIPTION - LOCATION: LOCATION: ABDOMEN

## 2021-05-22 NOTE — ED NOTES
Bed: 001A  Expected date:   Expected time:   Means of arrival: [facility]  Comments:     Kathia Garcia RN  05/22/21 1912

## 2021-05-22 NOTE — ED TRIAGE NOTES
Pt comes into ER room 1 via [place] [address] [facility] with 3 days of UTI and weakness and fatigue.   Pt has just stared MACROBID antibiotic for UTI

## 2021-05-23 LAB
ALBUMIN SERPL-MCNC: 2.9 G/DL (ref 3.5–5.1)
ANION GAP SERPL CALCULATED.3IONS-SCNC: 8 MEQ/L (ref 8–16)
ANION GAP SERPL CALCULATED.3IONS-SCNC: 8 MEQ/L (ref 8–16)
APTT: 26.8 SECONDS (ref 22–38)
BUN BLDV-MCNC: 23 MG/DL (ref 7–22)
BUN BLDV-MCNC: 25 MG/DL (ref 7–22)
CALCIUM SERPL-MCNC: 8.6 MG/DL (ref 8.5–10.5)
CALCIUM SERPL-MCNC: 8.6 MG/DL (ref 8.5–10.5)
CHLORIDE BLD-SCNC: 105 MEQ/L (ref 98–111)
CHLORIDE BLD-SCNC: 106 MEQ/L (ref 98–111)
CHOLESTEROL, TOTAL: 94 MG/DL (ref 100–199)
CO2: 25 MEQ/L (ref 23–33)
CO2: 27 MEQ/L (ref 23–33)
CREAT SERPL-MCNC: 0.6 MG/DL (ref 0.4–1.2)
CREAT SERPL-MCNC: 0.6 MG/DL (ref 0.4–1.2)
CREATININE URINE: 24.1 MG/DL
ERYTHROCYTE [DISTWIDTH] IN BLOOD BY AUTOMATED COUNT: 14.1 % (ref 11.5–14.5)
ERYTHROCYTE [DISTWIDTH] IN BLOOD BY AUTOMATED COUNT: 14.5 % (ref 11.5–14.5)
ERYTHROCYTE [DISTWIDTH] IN BLOOD BY AUTOMATED COUNT: 14.6 % (ref 11.5–14.5)
ERYTHROCYTE [DISTWIDTH] IN BLOOD BY AUTOMATED COUNT: 55.9 FL (ref 35–45)
ERYTHROCYTE [DISTWIDTH] IN BLOOD BY AUTOMATED COUNT: 55.9 FL (ref 35–45)
ERYTHROCYTE [DISTWIDTH] IN BLOOD BY AUTOMATED COUNT: 61.9 FL (ref 35–45)
FERRITIN: 123 NG/ML (ref 22–322)
FOLATE: 19.8 NG/ML (ref 4.8–24.2)
GFR SERPL CREATININE-BSD FRML MDRD: > 90 ML/MIN/1.73M2
GFR SERPL CREATININE-BSD FRML MDRD: > 90 ML/MIN/1.73M2
GLUCOSE BLD-MCNC: 160 MG/DL (ref 70–108)
GLUCOSE BLD-MCNC: 189 MG/DL (ref 70–108)
GLUCOSE BLD-MCNC: 189 MG/DL (ref 70–108)
GLUCOSE BLD-MCNC: 196 MG/DL (ref 70–108)
GLUCOSE BLD-MCNC: 196 MG/DL (ref 70–108)
GLUCOSE BLD-MCNC: 211 MG/DL (ref 70–108)
HCT VFR BLD CALC: 33.2 % (ref 42–52)
HCT VFR BLD CALC: 34.3 % (ref 42–52)
HCT VFR BLD CALC: 37.3 % (ref 42–52)
HDLC SERPL-MCNC: 42 MG/DL
HEMOGLOBIN: 10 GM/DL (ref 14–18)
HEMOGLOBIN: 10.2 GM/DL (ref 14–18)
HEMOGLOBIN: 10.6 GM/DL (ref 14–18)
INR BLD: 1.69 (ref 0.85–1.13)
INR BLD: 1.72 (ref 0.85–1.13)
IRON SATURATION: 17 % (ref 20–50)
IRON: 40 UG/DL (ref 65–195)
LDL CHOLESTEROL CALCULATED: 29 MG/DL
MCH RBC QN AUTO: 32.1 PG (ref 26–33)
MCH RBC QN AUTO: 32.2 PG (ref 26–33)
MCH RBC QN AUTO: 33.4 PG (ref 26–33)
MCHC RBC AUTO-ENTMCNC: 28.4 GM/DL (ref 32.2–35.5)
MCHC RBC AUTO-ENTMCNC: 29.7 GM/DL (ref 32.2–35.5)
MCHC RBC AUTO-ENTMCNC: 30.1 GM/DL (ref 32.2–35.5)
MCV RBC AUTO: 106.8 FL (ref 80–94)
MCV RBC AUTO: 107.9 FL (ref 80–94)
MCV RBC AUTO: 117.7 FL (ref 80–94)
PLATELET # BLD: 339 THOU/MM3 (ref 130–400)
PLATELET # BLD: 352 THOU/MM3 (ref 130–400)
PLATELET # BLD: 367 THOU/MM3 (ref 130–400)
PMV BLD AUTO: 8.7 FL (ref 9.4–12.4)
PMV BLD AUTO: 9.1 FL (ref 9.4–12.4)
PMV BLD AUTO: 9.2 FL (ref 9.4–12.4)
POTASSIUM SERPL-SCNC: 3.9 MEQ/L (ref 3.5–5.2)
POTASSIUM SERPL-SCNC: 4.3 MEQ/L (ref 3.5–5.2)
PROT/CREAT RATIO, UR: 18.67
PROTEIN, URINE: 450 MG/DL
RBC # BLD: 3.11 MILL/MM3 (ref 4.7–6.1)
RBC # BLD: 3.17 MILL/MM3 (ref 4.7–6.1)
RBC # BLD: 3.18 MILL/MM3 (ref 4.7–6.1)
SCAN OF BLOOD SMEAR: NORMAL
SODIUM BLD-SCNC: 139 MEQ/L (ref 135–145)
SODIUM BLD-SCNC: 140 MEQ/L (ref 135–145)
TOTAL IRON BINDING CAPACITY: 236 UG/DL (ref 171–450)
TRIGL SERPL-MCNC: 114 MG/DL (ref 0–199)
VITAMIN B-12: 952 PG/ML (ref 211–911)
WBC # BLD: 10.4 THOU/MM3 (ref 4.8–10.8)
WBC # BLD: 10.8 THOU/MM3 (ref 4.8–10.8)
WBC # BLD: 11.2 THOU/MM3 (ref 4.8–10.8)

## 2021-05-23 PROCEDURE — 82746 ASSAY OF FOLIC ACID SERUM: CPT

## 2021-05-23 PROCEDURE — 36415 COLL VENOUS BLD VENIPUNCTURE: CPT

## 2021-05-23 PROCEDURE — 85610 PROTHROMBIN TIME: CPT

## 2021-05-23 PROCEDURE — 85730 THROMBOPLASTIN TIME PARTIAL: CPT

## 2021-05-23 PROCEDURE — 6360000002 HC RX W HCPCS: Performed by: STUDENT IN AN ORGANIZED HEALTH CARE EDUCATION/TRAINING PROGRAM

## 2021-05-23 PROCEDURE — 1200000003 HC TELEMETRY R&B

## 2021-05-23 PROCEDURE — 2580000003 HC RX 258: Performed by: STUDENT IN AN ORGANIZED HEALTH CARE EDUCATION/TRAINING PROGRAM

## 2021-05-23 PROCEDURE — 6370000000 HC RX 637 (ALT 250 FOR IP): Performed by: STUDENT IN AN ORGANIZED HEALTH CARE EDUCATION/TRAINING PROGRAM

## 2021-05-23 PROCEDURE — 80061 LIPID PANEL: CPT

## 2021-05-23 PROCEDURE — 51798 US URINE CAPACITY MEASURE: CPT

## 2021-05-23 PROCEDURE — 82040 ASSAY OF SERUM ALBUMIN: CPT

## 2021-05-23 PROCEDURE — 80048 BASIC METABOLIC PNL TOTAL CA: CPT

## 2021-05-23 PROCEDURE — 2500000003 HC RX 250 WO HCPCS: Performed by: STUDENT IN AN ORGANIZED HEALTH CARE EDUCATION/TRAINING PROGRAM

## 2021-05-23 PROCEDURE — 82948 REAGENT STRIP/BLOOD GLUCOSE: CPT

## 2021-05-23 PROCEDURE — 85027 COMPLETE CBC AUTOMATED: CPT

## 2021-05-23 PROCEDURE — 99233 SBSQ HOSP IP/OBS HIGH 50: CPT | Performed by: HOSPITALIST

## 2021-05-23 PROCEDURE — 1200000000 HC SEMI PRIVATE

## 2021-05-23 PROCEDURE — 83540 ASSAY OF IRON: CPT

## 2021-05-23 PROCEDURE — 84156 ASSAY OF PROTEIN URINE: CPT

## 2021-05-23 PROCEDURE — 99221 1ST HOSP IP/OBS SF/LOW 40: CPT | Performed by: PHYSICIAN ASSISTANT

## 2021-05-23 PROCEDURE — 83550 IRON BINDING TEST: CPT

## 2021-05-23 PROCEDURE — 82607 VITAMIN B-12: CPT

## 2021-05-23 PROCEDURE — 82728 ASSAY OF FERRITIN: CPT

## 2021-05-23 PROCEDURE — 82570 ASSAY OF URINE CREATININE: CPT

## 2021-05-23 RX ORDER — PAROXETINE HYDROCHLORIDE 20 MG/1
40 TABLET, FILM COATED ORAL EVERY MORNING
Status: DISCONTINUED | OUTPATIENT
Start: 2021-05-23 | End: 2021-05-29 | Stop reason: HOSPADM

## 2021-05-23 RX ORDER — RAMIPRIL 10 MG/1
10 CAPSULE ORAL DAILY
Status: DISCONTINUED | OUTPATIENT
Start: 2021-05-23 | End: 2021-05-29 | Stop reason: HOSPADM

## 2021-05-23 RX ORDER — ANTIOX #8/OM3/DHA/EPA/LUT/ZEAX 250-2.5 MG
1 CAPSULE ORAL DAILY
Status: DISCONTINUED | OUTPATIENT
Start: 2021-05-23 | End: 2021-05-23 | Stop reason: CLARIF

## 2021-05-23 RX ORDER — HEPARIN SODIUM 1000 [USP'U]/ML
10000 INJECTION, SOLUTION INTRAVENOUS; SUBCUTANEOUS ONCE
Status: COMPLETED | OUTPATIENT
Start: 2021-05-23 | End: 2021-05-23

## 2021-05-23 RX ORDER — TAMSULOSIN HYDROCHLORIDE 0.4 MG/1
0.4 CAPSULE ORAL DAILY
Status: DISCONTINUED | OUTPATIENT
Start: 2021-05-23 | End: 2021-05-29 | Stop reason: HOSPADM

## 2021-05-23 RX ORDER — VITAMIN B COMPLEX
2000 TABLET ORAL DAILY
Status: DISCONTINUED | OUTPATIENT
Start: 2021-05-23 | End: 2021-05-29 | Stop reason: HOSPADM

## 2021-05-23 RX ORDER — ASPIRIN 81 MG/1
81 TABLET ORAL DAILY
Status: DISCONTINUED | OUTPATIENT
Start: 2021-05-23 | End: 2021-05-29 | Stop reason: HOSPADM

## 2021-05-23 RX ORDER — HEPARIN SODIUM 1000 [USP'U]/ML
10000 INJECTION, SOLUTION INTRAVENOUS; SUBCUTANEOUS PRN
Status: DISCONTINUED | OUTPATIENT
Start: 2021-05-23 | End: 2021-05-29 | Stop reason: HOSPADM

## 2021-05-23 RX ORDER — SODIUM CHLORIDE 0.9 % (FLUSH) 0.9 %
5-40 SYRINGE (ML) INJECTION PRN
Status: DISCONTINUED | OUTPATIENT
Start: 2021-05-23 | End: 2021-05-29 | Stop reason: HOSPADM

## 2021-05-23 RX ORDER — METOPROLOL SUCCINATE 100 MG/1
100 TABLET, EXTENDED RELEASE ORAL DAILY
Status: DISCONTINUED | OUTPATIENT
Start: 2021-05-23 | End: 2021-05-29 | Stop reason: HOSPADM

## 2021-05-23 RX ORDER — DEXTROSE MONOHYDRATE 25 G/50ML
12.5 INJECTION, SOLUTION INTRAVENOUS PRN
Status: DISCONTINUED | OUTPATIENT
Start: 2021-05-23 | End: 2021-05-29 | Stop reason: HOSPADM

## 2021-05-23 RX ORDER — DEXTROSE MONOHYDRATE 50 MG/ML
100 INJECTION, SOLUTION INTRAVENOUS PRN
Status: DISCONTINUED | OUTPATIENT
Start: 2021-05-23 | End: 2021-05-29 | Stop reason: HOSPADM

## 2021-05-23 RX ORDER — SODIUM CHLORIDE 0.9 % (FLUSH) 0.9 %
5-40 SYRINGE (ML) INJECTION EVERY 12 HOURS SCHEDULED
Status: DISCONTINUED | OUTPATIENT
Start: 2021-05-23 | End: 2021-05-29 | Stop reason: HOSPADM

## 2021-05-23 RX ORDER — FINASTERIDE 5 MG/1
5 TABLET, FILM COATED ORAL DAILY
Status: DISCONTINUED | OUTPATIENT
Start: 2021-05-23 | End: 2021-05-29 | Stop reason: HOSPADM

## 2021-05-23 RX ORDER — HEPARIN SODIUM 1000 [USP'U]/ML
5000 INJECTION, SOLUTION INTRAVENOUS; SUBCUTANEOUS PRN
Status: DISCONTINUED | OUTPATIENT
Start: 2021-05-23 | End: 2021-05-29 | Stop reason: HOSPADM

## 2021-05-23 RX ORDER — METFORMIN HYDROCHLORIDE 500 MG/1
1000 TABLET, EXTENDED RELEASE ORAL
Status: DISCONTINUED | OUTPATIENT
Start: 2021-05-23 | End: 2021-05-29 | Stop reason: HOSPADM

## 2021-05-23 RX ORDER — NICOTINE POLACRILEX 4 MG
15 LOZENGE BUCCAL PRN
Status: DISCONTINUED | OUTPATIENT
Start: 2021-05-23 | End: 2021-05-29 | Stop reason: HOSPADM

## 2021-05-23 RX ORDER — ACETAMINOPHEN 325 MG/1
650 TABLET ORAL EVERY 4 HOURS PRN
Status: DISCONTINUED | OUTPATIENT
Start: 2021-05-23 | End: 2021-05-29 | Stop reason: HOSPADM

## 2021-05-23 RX ORDER — HEPARIN SODIUM 10000 [USP'U]/100ML
5-30 INJECTION, SOLUTION INTRAVENOUS CONTINUOUS
Status: DISCONTINUED | OUTPATIENT
Start: 2021-05-23 | End: 2021-05-29 | Stop reason: HOSPADM

## 2021-05-23 RX ORDER — SODIUM CHLORIDE 9 MG/ML
25 INJECTION, SOLUTION INTRAVENOUS PRN
Status: DISCONTINUED | OUTPATIENT
Start: 2021-05-23 | End: 2021-05-29 | Stop reason: HOSPADM

## 2021-05-23 RX ORDER — SODIUM CHLORIDE 9 MG/ML
INJECTION, SOLUTION INTRAVENOUS CONTINUOUS
Status: DISCONTINUED | OUTPATIENT
Start: 2021-05-23 | End: 2021-05-29 | Stop reason: HOSPADM

## 2021-05-23 RX ORDER — ALOGLIPTIN 25 MG/1
25 TABLET, FILM COATED ORAL DAILY
Status: DISCONTINUED | OUTPATIENT
Start: 2021-05-23 | End: 2021-05-29 | Stop reason: HOSPADM

## 2021-05-23 RX ORDER — EZETIMIBE AND SIMVASTATIN 10; 40 MG/1; MG/1
1 TABLET ORAL NIGHTLY
Status: DISCONTINUED | OUTPATIENT
Start: 2021-05-23 | End: 2021-05-29 | Stop reason: HOSPADM

## 2021-05-23 RX ORDER — SIMVASTATIN 20 MG
40 TABLET ORAL NIGHTLY
Status: DISCONTINUED | OUTPATIENT
Start: 2021-05-23 | End: 2021-05-23 | Stop reason: CLARIF

## 2021-05-23 RX ORDER — M-VIT,TX,IRON,MINS/CALC/FOLIC 27MG-0.4MG
1 TABLET ORAL DAILY
Status: DISCONTINUED | OUTPATIENT
Start: 2021-05-23 | End: 2021-05-29 | Stop reason: HOSPADM

## 2021-05-23 RX ADMIN — PAROXETINE HYDROCHLORIDE 40 MG: 20 TABLET, FILM COATED ORAL at 09:32

## 2021-05-23 RX ADMIN — MULTIPLE VITAMINS W/ MINERALS TAB 1 TABLET: TAB at 09:32

## 2021-05-23 RX ADMIN — FINASTERIDE 5 MG: 5 TABLET, FILM COATED ORAL at 09:33

## 2021-05-23 RX ADMIN — METOPROLOL SUCCINATE 100 MG: 100 TABLET, EXTENDED RELEASE ORAL at 09:32

## 2021-05-23 RX ADMIN — HEPARIN SODIUM 16 UNITS/KG/HR: 10000 INJECTION, SOLUTION INTRAVENOUS at 18:43

## 2021-05-23 RX ADMIN — INSULIN LISPRO 2 UNITS: 100 INJECTION, SOLUTION INTRAVENOUS; SUBCUTANEOUS at 11:56

## 2021-05-23 RX ADMIN — Medication 2000 UNITS: at 09:32

## 2021-05-23 RX ADMIN — TAMSULOSIN HYDROCHLORIDE 0.4 MG: 0.4 CAPSULE ORAL at 09:32

## 2021-05-23 RX ADMIN — CEFTRIAXONE SODIUM 1000 MG: 1 INJECTION, POWDER, FOR SOLUTION INTRAMUSCULAR; INTRAVENOUS at 22:41

## 2021-05-23 RX ADMIN — MICONAZOLE NITRATE: 20 POWDER TOPICAL at 09:33

## 2021-05-23 RX ADMIN — MICONAZOLE NITRATE: 20 POWDER TOPICAL at 20:46

## 2021-05-23 RX ADMIN — INSULIN LISPRO 2 UNITS: 100 INJECTION, SOLUTION INTRAVENOUS; SUBCUTANEOUS at 18:14

## 2021-05-23 RX ADMIN — HEPARIN SODIUM 10000 UNITS: 1000 INJECTION, SOLUTION INTRAVENOUS; SUBCUTANEOUS at 18:42

## 2021-05-23 RX ADMIN — SODIUM CHLORIDE: 9 INJECTION, SOLUTION INTRAVENOUS at 15:20

## 2021-05-23 ASSESSMENT — ENCOUNTER SYMPTOMS
VOMITING: 0
DIARRHEA: 0
BACK PAIN: 0
SORE THROAT: 0
SINUS PRESSURE: 0
SHORTNESS OF BREATH: 0
EYE REDNESS: 0
COLOR CHANGE: 0
COUGH: 0
ABDOMINAL PAIN: 0
NAUSEA: 0
CHEST TIGHTNESS: 0
PHOTOPHOBIA: 0
CONSTIPATION: 0
BLOOD IN STOOL: 0

## 2021-05-23 ASSESSMENT — PAIN SCALES - GENERAL
PAINLEVEL_OUTOF10: 0

## 2021-05-23 NOTE — PROGRESS NOTES
Patient admitted to floor. 2 person skin assessment completed with Peggy Guo and Deana Chao. Documented multiple sites of skin issues.

## 2021-05-23 NOTE — H&P
the past patient has had issues with hematuria while on his coumadin. He notes that he recently had an INR that was > 3 and they had bumped down his warfarin dose. Patient denies any history of GI bleed. Patient was a former smoker. Worked in [employer] when younger. He notes both his mother and father passed away and had bladder cancer? He notes that he has seen a urologist in the past but has not gone in 7 years approximately. He appears to have a history of BPH per chart review. Patient denies any melena or bloody stools. Patient's wife states she is unable to care for patient at home. She notes that they have had difficulty transporting him lately. He has had increased inability to function. He uses wheelchair to get around. Home health comes to the house. She states that he has had a rash under his abdomen and around his groin for a while. In the ED patient initially had Atrial Fibrillation with RVR. Heart rate improved into 80s. Patient urine was found to be dark and UA was skewed; however, did have pyuria and hematuria. WBC elevated > 12. Lactic WNL. Patient has urine that is dark brown/black on exam. Patient appears in no acute distress. INR 1.8 on admission. See A&P for further details. Past Medical History:          Diagnosis Date    Allergic rhinitis     Atrial fibrillation ([address] 2/2014    Depression     Diabetes mellitus type II 1/2012    diagnosed with HgA1c 6.6    Erectile dysfunction     Hyperlipidemia     Hypertension     Lower extremity edema     LV dysfunction     h/o, normal now    Morbid obesity ([address]     Osteoarthritis     left knee, right foot, back    PE (pulmonary embolism) 2/2014       Past Surgical History:          Procedure Laterality Date    CHOLECYSTECTOMY      PULMONARY EMBOLISM SURGERY Bilateral 2/8/2014       Medications Prior to Admission:      Prior to Admission medications    Medication Sig Start Date End Date Taking?  Authorizing Provider PARoxetine (PAXIL) 40 MG tablet Take 1 tablet by mouth every morning New dose. 4/20/21  Yes Mago Gautam MD   SITagliptin (JANUVIA) 100 MG tablet Take 1 tablet by mouth daily Discontinue script for Tradjenta 3/25/21  Yes Mago Gautam MD   bumetanide (BUMEX) 1 MG tablet TAKE 1 TABLET DAILY AS NEEDED (FLUID OVERLOAD, WEIGHT GAIN, SHORTNESS OF BREATH) 3/10/21  Yes Mago Gautam MD   acetaminophen (TYLENOL) 500 MG tablet Take 500 mg by mouth every 6 hours as needed for Pain or Fever Don't take more than 3,000 mg each day, including what's in Tylenol P.M. Yes Historical Provider, MD   diphenhydrAMINE-APAP, sleep, (TYLENOL PM EXTRA STRENGTH)  MG tablet Take 2 tablets by mouth nightly as needed for Sleep   Yes Historical Provider, MD   dutasteride (AVODART) 0.5 MG capsule TAKE 1 CAPSULE DAILY 1/3/21  Yes Mago Gautam MD   ezetimibe-simvastatin (VYTORIN) 10-40 MG per tablet TAKE 1 TABLET NIGHTLY 1/3/21 7/2/21 Yes Mago Gautam MD   ramipril (ALTACE) 10 MG capsule TAKE 1 CAPSULE DAILY 12/15/20  Yes Lourdes Perea MD   metoprolol succinate (TOPROL XL) 100 MG extended release tablet TAKE 1 TABLET DAILY 12/15/20  Yes Lourdes Perea MD   metFORMIN (GLUCOPHAGE-XR) 500 MG extended release tablet TAKE 2 TABLETS DAILY WITH BREAKFAST  Patient taking differently: TAKE 1 TABLETS DAILY WITH BREAKFAST 12/15/20  Yes Lourdes Perea MD   warfarin (COUMADIN) 5 MG tablet Take as directed by [facility].   (135 tabs = 90 day supply) 6/2/20  Yes Shirley Eller MD   [facility] Natural Products (OSTEO BI-FLEX TRIPLE STRENGTH) TABS Take 2 tablets by mouth daily Indications: Knee Pain   Yes Historical Provider, MD   Multiple Vitamins-Minerals (PRESERVISION AREDS 2 PO) Take by mouth daily   Yes Historical Provider, MD   fluticasone (FLONASE) 50 MCG/ACT nasal spray USE 2 SPRAYS NASALLY DAILY  Patient taking differently: as needed  12/12/18  Yes Mago Gautam MD   aspirin EC 81 MG EC proctitis. This document has been electronically signed by: Shalom iLang. DO Drew on    05/22/2021 10:48 PM      All CTs at this facility use dose modulation techniques and iterative    reconstructions, and/or weight-based dosing   when appropriate to reduce radiation to a low as reasonably achievable. IR INTERVENTIONAL RADIOLOGY PROCEDURE REQUEST    (Results Pending)            DVT prophylaxis: [] Lovenox                                 [] SCDs                                 [] SQ Heparin                                 [] Encourage ambulation           [x] Already on Anticoagulation - warfarin    Code Status: Full Code    Disposition:    [x] Home       [] TCU       [] Rehab       [] Psych       [] SNF       [] [facility]       [] Other-    Thank you Shalom Liang MD for the opportunity to be involved in this patient's care.     Electronically signed by Suresh Hoffman DO on 5/23/2021 at 3:48 AM

## 2021-05-23 NOTE — ED NOTES
ED to inpatient nurses report    Chief Complaint   Patient presents with    Urinary Tract Infection    Fatigue    Leg Swelling      Present to ED from home  LOC: alert and orientated to name, place, date  Vital signs   Vitals:    05/22/21 1913 05/22/21 2034 05/22/21 2203   BP: (!) 149/95 137/71 128/71   Pulse: 108 103 102   Resp: 25 20 20   Temp: 98 °F (36.7 °C)     TempSrc: Oral     SpO2: 97% 98% 97%   Weight: 272 lb (123.4 kg)     Height: 5' 8\" (1.727 m)        Oxygen Baseline 98    Current needs required no  LDAs: 20 left hand  Peripheral IV 05/22/21 Left Hand (Active)   Site Assessment Clean; Intact;Dry 05/22/21 1917   Dressing Status Clean;Dry; Intact 05/22/21 1917     Mobility: Requires assistance * 2  Pending ED orders: n/a  Present condition: stable      Electronically signed by Aquilino Sequeira RN on 5/22/2021 at 11:51 PM       Aquilino Sequeira RN  05/22/21 5135

## 2021-05-23 NOTE — PROGRESS NOTES
PROGRESS NOTE      Patient:  Liliana Crawley. Unit/Bed:[facility]/027-A    YOB: 1940    MRN: [id number]       Acct: [de-identified]     PCP: Kirstie Mortensen MD    Date of Admission: 5/22/2021      Assessment/Plan:    Active Hospital Problems    Diagnosis Date Noted    Acute cystitis with hematuria [N30.01]     Calculi, bladder, diverticulum [N21.0] 05/22/2021       Gross hematuria  Acute cystitis with growth of gram positive cocci on UCx  Non-obstructing Bilateral Nephrolithiasis and Cystolithiasis  -Low clinical suspicion for sepsis  -Continue ceftriaxone pending final urine C&S.  -Continue flomax  -Urology consulted    Suspected Large Lower Right Flank and Hemipelvis Hematoma, as seen on CTA  -Holding coumadin  -IR consulted for evaluation of drainage    Persistent Atrial Fibrillation on Coumadin  INR 1.81 on admission, recently elevated > 3 per patient. Follows in coumadin clinic  EKG reviewed: Atrial Fibrillation with RVR (rate 110)  Rate improved to 80 in ED  -Continue home toprol   -Holding Coumadin pending urology eval  -Daily INR  -[id number] or 5; consider starting heparin gtt pending IR/urology recommendations    Essential HTN  -Continue home metoprolol; lisinopril on hold pending eval for urological procedures    NIDDM2  -A1C 03/2021 7.6  -Blood sugar elevated 225 on admission  -Holding Nesina and Metformin  -Start SSI, accu checks, hypoglycemia protocol     BPH  -Retaining more than 500 cc in bladder per bladder scan today. Place [facility]. Physical deconditioning  -PT/OT to evaluate. IP rehab vs SNF on discharge. Congenital Talipes Equinovarus   HLD  Depression  Obesity Body mass index is 45.05 kg/m². -Noted per history    Code Status: Full Code    Discharge planning:  Pending clinical improvement. Return to home vs IP rehab vs SNF on discharge. Chief Complaint: Dark-colored urine    Hospital Course: Admitted 5/22/2021.  Per initial admissino H&P: \"72 y.o. male who presented to [address] [address] with dark urine. Patient states that today he noticed his urine was \"brown\". He denies any clots of bright red blood. He notes that he has had some intermittent dysuria over the past couple of days. Patient has also had increased urinary urgency and frequency. He denies back pain or abdominal pain. Patient also complains of fatigue that has been going on for weeks gradually. In the past patient has had issues with hematuria while on his coumadin. He notes that he recently had an INR that was > 3 and they had bumped down his warfarin dose. Patient denies any history of GI bleed. In the ED patient initially had Atrial Fibrillation with RVR. Heart rate improved into 80s. Patient urine was found to be dark and UA was skewed; however, did have pyuria and hematuria. WBC elevated > 12. Lactic WNL. Patient has urine that is dark brown/black on exam. Patient appears in no acute distress. INR 1.8 on admission. \"     Subjective: Seen during morning rounds. Appears comfortable laying in bed. RN reports patient has been refusing insulin. Discussed need for SSI at this time considering metformin and januvia on hold, pt questions answered, is amenable to starting SSI. States urine is still dark, brown colored, but denies any dysuria, fevers, chills, abdominal pain, nausea, vomiting, SOB.        Medications:  Reviewed    Infusion Medications    sodium chloride 150 mL/hr at 05/23/21 0149    sodium chloride      dextrose       Scheduled Medications    sodium chloride flush  5-40 mL Intravenous 2 times per day    cefTRIAXone (ROCEPHIN) IV  1,000 mg Intravenous Q24H    tamsulosin  0.4 mg Oral Daily    [Held by provider] metFORMIN  1,000 mg Oral Daily with breakfast    metoprolol succinate  100 mg Oral Daily    PARoxetine  40 mg Oral QAM    [Held by provider] ramipril  10 mg Oral Daily    [Held by provider] alogliptin  25 mg Oral Daily    [Held by provider] aspirin EC  81 mg Oral Daily  Vitamin D  2,000 Units Oral Daily    finasteride  5 mg Oral Daily    insulin lispro  0-12 Units Subcutaneous TID WC    insulin lispro  0-6 Units Subcutaneous Nightly    miconazole   Topical BID    therapeutic multivitamin-minerals  1 tablet Oral Daily    ezetimibe-simvastatin  1 tablet Oral Nightly     PRN Meds: sodium chloride flush, sodium chloride, acetaminophen, glucose, dextrose, glucagon (rDNA), dextrose      Intake/Output Summary (Last 24 hours) at 5/23/2021 1310  Last data filed at 5/23/2021 0443  Gross per 24 hour   Intake 880.2 ml   Output --   Net 880.2 ml       Diet:  Diet NPO Effective Now    Exam:  /77   Pulse 87   Temp 98 °F (36.7 °C) (Oral)   Resp 20   Ht 5' 8\" (1.727 m) Comment: per patient  Wt 296 lb 4.8 oz (134.4 kg) Comment: bed scale, does not stand  SpO2 98%   BMI 45.05 kg/m²     General appearance: No apparent distress, appears stated age and cooperative. Obese. HEENT: Pupils equal, round, and reactive to light. Conjunctivae/corneas clear. Neck: Supple, with full range of motion. No jugular venous distention. Trachea midline. Respiratory:  Normal respiratory effort. Clear to auscultation, bilaterally without Rales/Wheezes/Rhonchi. Cardiovascular: Regular rate and rhythm with normal S1/S2 without murmurs, rubs or gallops. Abdomen: Soft, non-tender, non-distended with normal bowel sounds. Musculoskeletal: passive and active ROM x 4 extremities. Skin: Skin color, texture, turgor normal.  No rashes or lesions. Neurologic:  Neurovascularly intact without any focal sensory/motor deficits.  Cranial nerves: II-XII intact, grossly non-focal.  Psychiatric: Alert and oriented, thought content appropriate, normal insight  Capillary Refill: Brisk,< 3 seconds   Peripheral Pulses: +2 palpable, equal bilaterally       Labs:   Recent Labs     05/22/21 2015 05/23/21  0328 05/23/21  0659   WBC 12.4* 10.8 10.4   HGB 11.1* 10.2* 10.6*   HCT 36.9* 34.3* 37.3*    352 339 Recent Labs     05/22/21 2015 05/23/21  0328 05/23/21  0659    139 140   K 4.1 3.9 4.3    106 105   CO2 26 25 27   BUN 28* 25* 23*   CREATININE 0.7 0.6 0.6   CALCIUM 9.1 8.6 8.6     Recent Labs     05/22/21 2015   AST 17   ALT 15   BILIDIR <0.2   BILITOT 0.6   ALKPHOS 69     Recent Labs     05/22/21 2015 05/23/21  0328 05/23/21  0659   INR 1.81* 1.72* 1.69*     Recent Labs     05/22/21 2015   CKTOTAL 103       Urinalysis:      Lab Results   Component Value Date    NITRU see below 05/22/2021    WBCUA 25-50 05/22/2021    WBCUA 6-10 05/30/2015    BACTERIA MANY 05/22/2021    RBCUA 50-75 05/22/2021    BLOODU see below 05/22/2021    SPECGRAV see below 05/22/2021    GLUCOSEU Negative 05/30/2015       Radiology:  CT ABDOMEN PELVIS WO CONTRAST Additional Contrast? None   Final Result   Impression:   1. Motion affected study. 2.  Nonobstructing calculi both kidneys and in the urinary bladder. Acute    cystitis. 7 cm and 2.4 cm urinary bladder diverticuli. 3.  Large mass in the dorsal subcutaneous tissues of the lower right flank    and hemipelvis most suggestive of a hematoma which measures approximately    10 x 12 x 19 cm. Recommend clinical correlation. 4.  No bowel obstruction or ascites. Colonic diverticulosis without acute    diverticulitis. Probable proctitis. This document has been electronically signed by: Pipe Russell DO on    05/22/2021 10:48 PM      All CTs at this facility use dose modulation techniques and iterative    reconstructions, and/or weight-based dosing   when appropriate to reduce radiation to a low as reasonably achievable.       CT ABSCESS DRAINAGE W CATH PLACEMENT S&I    (Results Pending)       Diet: Diet NPO Effective Now    PT/OT Eval Status: n/a    DVT prophylaxis: [] Lovenox                                 [x] SCDs                                 [] SQ Heparin                                 [] Encourage ambulation           [] Already on Anticoagulation Disposition:    [x] Home       [] TCU       [x] Rehab       [] Psych       [x] SNF       [] [facility]       [] Other-    Case discussed with Dr. Tonya Ramirez    Electronically signed by Pavan Adam MD on 5/23/2021 at 1:10 PM

## 2021-05-23 NOTE — ED NOTES
Pt resting in bed upon entering room. Pt requesting something to drink and provided water at this time. VS reassessed and stable and pt currently denies pain. Pt waiting on provider to discuss the POC. Will continue to monitor and update on the POC. Family at the bedside and call light in reach.      Annamarie Lo RN  05/22/21 2040

## 2021-05-23 NOTE — ED PROVIDER NOTES
[place] ENCOUNTER          Pt Name: Beryle Ro. MRN: [id number]  Birthdate 1940  Date of evaluation: 5/22/2021  Emergency Physician: Jose Moreira, [facility]       Chief Complaint   Patient presents with    Urinary Tract Infection    Fatigue    Leg Swelling     History obtained from the patient. HISTORY OF PRESENT ILLNESS    HPI  Beryle Ro. is a 68 y.o. male who presents to the emergency department for evaluation of dysuria, frequent urination,, fatigue. Patient has had frequent urination dysuria and dark urine over the last 2 days. Wife states patient is having increased difficulty with activities of daily life. He is having frequent falls. States his last fall he fell onto his bottom a few days ago. States no LOC. Denies head injury. Patient does take blood thinners. No fever no chills no cough no congestion no shortness of breath. Wife states she has been having had increased difficulty taking care of the patient. The patient has no other acute complaints at this time. REVIEW OF SYSTEMS   Review of Systems   Constitutional: Positive for fatigue. Negative for activity change, chills and fever. HENT: Negative for congestion, sinus pressure and sore throat. Eyes: Negative for photophobia, redness and visual disturbance. Respiratory: Negative for cough and chest tightness. Cardiovascular: Negative for chest pain, palpitations and leg swelling. Gastrointestinal: Negative for abdominal pain, nausea and vomiting. Endocrine: Negative for polydipsia and polyuria. Genitourinary: Positive for dysuria, frequency and urgency. Negative for decreased urine volume, difficulty urinating and flank pain. Musculoskeletal: Positive for arthralgias. Negative for back pain, myalgias and neck pain. Skin: Negative for color change and rash. Neurological: Positive for weakness. Negative for headaches. Hematological: Negative for adenopathy. Does not bruise/bleed easily. Psychiatric/Behavioral: Negative for confusion and self-injury.          PAST MEDICAL AND SURGICAL HISTORY     Past Medical History:   Diagnosis Date    Allergic rhinitis     Atrial fibrillation ([facility] [address] 2/2014    Depression     Diabetes mellitus type II 1/2012    diagnosed with HgA1c 6.6    Erectile dysfunction     Hyperlipidemia     Hypertension     Lower extremity edema     LV dysfunction     h/o, normal now    Morbid obesity ([facility] [address]     Osteoarthritis     left knee, right foot, back    PE (pulmonary embolism) 2/2014     Past Surgical History:   Procedure Laterality Date    CHOLECYSTECTOMY      PULMONARY EMBOLISM SURGERY Bilateral 2/8/2014         MEDICATIONS     Current Facility-Administered Medications:     0.9 % sodium chloride infusion, , Intravenous, Continuous, Ayad Kearney DO, Last Rate: 150 mL/hr at 05/23/21 0149, Rate Change at 05/23/21 0149    sodium chloride flush 0.9 % injection 5-40 mL, 5-40 mL, Intravenous, 2 times per day, Elester Buerger Kosa, DO    sodium chloride flush 0.9 % injection 5-40 mL, 5-40 mL, Intravenous, PRN, Elester Buerger Kosa, DO    0.9 % sodium chloride infusion, 25 mL, Intravenous, PRN, Elester Buerger Kosa, DO    cefTRIAXone (ROCEPHIN) 1000 mg IVPB in 50 mL D5W minibag, 1,000 mg, Intravenous, Q24H, Karina Clemente DO    acetaminophen (TYLENOL) tablet 650 mg, 650 mg, Oral, Q4H PRN, Ayad Kearney DO    tamsulosin [facility]) capsule 0.4 mg, 0.4 mg, Oral, Daily, Karina Clemente DO    glucose (GLUTOSE) 40 % oral gel 15 g, 15 g, Oral, PRN, Elester Buerger Kosa, DO    dextrose 50 % IV solution, 12.5 g, Intravenous, PRN, Elester Buerger Kosa, DO    glucagon (rDNA) injection 1 mg, 1 mg, Intramuscular, PRN, Elester Buerger Kosa, DO    dextrose 5 % solution, 100 mL/hr, Intravenous, PRN, Ayad Kearney DO    insulin lispro (HUMALOG) injection vial 0-12 Units, 0-12 Units, Subcutaneous, TID WC, Ayad Kearney DO   insulin lispro (HUMALOG) injection vial 0-6 Units, 0-6 Units, Subcutaneous, Nightly, Karina Clemente DO    miconazole (MICOTIN) 2 % powder, , Topical, BID, Yomaira Dalal,       SOCIAL HISTORY     Social History     Social History Narrative    Not on file     Social History     Tobacco Use    Smoking status: Former Smoker     Packs/day: 1.50     Years: 15.00     Pack years: 22.50     Types: Cigarettes     Quit date: 1984     Years since quittin.5    Smokeless tobacco: Former User     Types: Snuff, Chew   Substance Use Topics    Alcohol use: No    Drug use: No         ALLERGIES     Allergies   Allergen Reactions    Lipitor Other (See Comments)     myalgia    Pcn [Penicillins] Rash         FAMILY HISTORY     Family History   Problem Relation Age of Onset    Cancer Mother         esophagus    Cancer Father         colon    Cancer Brother          PHYSICAL EXAM     ED Triage Vitals [21 1913]   BP Temp Temp Source Pulse Resp SpO2 Height Weight   (!) 149/95 98 °F (36.7 °C) Oral 108 25 97 % 5' 8\" (1.727 m) 272 lb (123.4 kg)         Additional Vital Signs:  Vitals:    21 0030   BP: 132/66   Pulse: 83   Resp: 20   Temp: 98.9 °F (37.2 °C)   SpO2: 97%       Physical Exam  Vitals and nursing note reviewed. Constitutional:       General: He is not in acute distress. Appearance: He is well-developed. He is not diaphoretic. HENT:      Head: Normocephalic. Eyes:      Pupils: Pupils are equal, round, and reactive to light. Neck:      Vascular: No JVD. Cardiovascular:      Rate and Rhythm: Normal rate and regular rhythm. Heart sounds: Normal heart sounds. Pulmonary:      Effort: Pulmonary effort is normal.      Breath sounds: Normal breath sounds. Abdominal:      General: Bowel sounds are normal. There is no distension. Palpations: Abdomen is soft. Tenderness: There is no abdominal tenderness. Musculoskeletal:         General: No tenderness or deformity.  Normal HEPATIC FUNCTION PANEL - Abnormal; Notable for the following components:    Albumin 3.0 (*)     All other components within normal limits   CULTURE, URINE   CULTURE, BLOOD 1   CULTURE, BLOOD 2   URINE DRUG SCREEN   ICTOTEST, URINE   APTT   BRAIN NATRIURETIC PEPTIDE   LIPASE   MAGNESIUM   TROPONIN   CK   LACTIC ACID, PLASMA   ANION GAP   GLOMERULAR FILTRATION RATE, ESTIMATED   OSMOLALITY   LACTATE, SEPSIS   PROTIME-INR   BASIC METABOLIC PANEL   CBC   VITAMIN B12 & FOLATE   IRON   IRON SATURATION   IRON BINDING CAPACITY   FERRITIN   POCT GLUCOSE   POCT GLUCOSE       Radiologic studies results:  CT ABDOMEN PELVIS WO CONTRAST Additional Contrast? None   Final Result   Impression:   1. Motion affected study. 2.  Nonobstructing calculi both kidneys and in the urinary bladder. Acute    cystitis. 7 cm and 2.4 cm urinary bladder diverticuli. 3.  Large mass in the dorsal subcutaneous tissues of the lower right flank    and hemipelvis most suggestive of a hematoma which measures approximately    10 x 12 x 19 cm. Recommend clinical correlation. 4.  No bowel obstruction or ascites. Colonic diverticulosis without acute    diverticulitis. Probable proctitis. This document has been electronically signed by: Lenin Aldana. DO Drew on    05/22/2021 10:48 PM      All CTs at this facility use dose modulation techniques and iterative    reconstructions, and/or weight-based dosing   when appropriate to reduce radiation to a low as reasonably achievable.       IR INTERVENTIONAL RADIOLOGY PROCEDURE REQUEST    (Results Pending)       ED Medications administered this visit:   Medications   0.9 % sodium chloride infusion ( Intravenous Rate/Dose Change 5/23/21 0149)   sodium chloride flush 0.9 % injection 5-40 mL (has no administration in time range)   sodium chloride flush 0.9 % injection 5-40 mL (has no administration in time range)   0.9 % sodium chloride infusion (has no administration in time range)   cefTRIAXone (ROCEPHIN) 1000 mg IVPB in 50 mL D5W minibag (has no administration in time range)   acetaminophen (TYLENOL) tablet 650 mg (has no administration in time range)   tamsulosin (FLOMAX) capsule 0.4 mg (has no administration in time range)   glucose (GLUTOSE) 40 % oral gel 15 g (has no administration in time range)   dextrose 50 % IV solution (has no administration in time range)   glucagon (rDNA) injection 1 mg (has no administration in time range)   dextrose 5 % solution (has no administration in time range)   insulin lispro (HUMALOG) injection vial 0-12 Units (has no administration in time range)   insulin lispro (HUMALOG) injection vial 0-6 Units (has no administration in time range)   miconazole (MICOTIN) 2 % powder (has no administration in time range)   cefTRIAXone (ROCEPHIN) 1000 mg IVPB in 50 mL D5W minibag (0 mg Intravenous Stopped 5/22/21 0512)         ED COURSE     ED Course as of May 23 0349   Sun May 23, 2021   0348 INR(!): 1.81 [DD]   8214 Lactic Acid, Sepsis: 1.7 [DD]   0348 Tachycardia and elevated white count blood cultures were sent. Rocephin was started. WBC(!): 12.4 [DD]   0348 WBC, UA: 25-50 [DD]   0348 Antibiotic started. RBC, UA: 50-75 [DD]   0348 Normal lactate. Lactic Acid, Sepsis: 1.7 [DD]      ED Course User Index  [DD] Henry Lyn DO       Discussed case with Dr. Maria Luisa Ball patient will likely need continuation of antibiotics gentle hydration and PT OT. Dr. Adalgisa Rosales accepted the admission. The diagnosis, extensive differential diagnosis, laboratory and imaging findings were discussed at the bedside. The patient was an active participant in their care. They are agreeable to the plan of care. All questions and concerns were addressed at the time of the encounter.   MEDICATION CHANGES     DISCHARGE MEDICATIONS:  Current Discharge Medication List               FINAL DISPOSITION     Final diagnoses:   Acute cystitis with hematuria   Urinary tract infection with hematuria, site unspecified   Hematoma     Condition: condition: good  Dispo: Discharge to home    PATIENT REFERRED TO:  No follow-up provider specified. Critical Care Time   None      This transcription was electronically signed. Parts of this transcriptions may have been dictated by use of voice recognition software and electronically transcribed, and parts may have been transcribed with the assistance of an ED scribe. The transcription may contain errors not detected in proofreading.     Electronically Signed: Henry Lyn DO, 05/23/21, 3:44 AM      Henry Lyn DO  05/23/21 8856

## 2021-05-23 NOTE — FLOWSHEET NOTE
05/23/21 1458   Provider Notification   Reason for Communication Review case   Provider Name NICKY QUIROS   Provider Notification Physician   Method of Communication Call   Response See orders       patient admitted for hematuria with Nephrolithiasis and bladder stone noted on CT scan. Patient had a bladder scan showing  and reilly catheter was ordered. Nursing staff unsuccessful at placing catheter- we were unable to see the insertion point with his anatomy. I called and got a urology cart at bedside if you're able to attempt? ? Thank you      Dr NICKY QUIROS called this RN and stated to discontinue the CT scan ordered and just trend the H&H q8hr and to discontinue the reilly catheter order. He stated the patient is asymptomatic and this is chronic retention and the patient is having no difficulty urinating. Orders placed per this RN.

## 2021-05-23 NOTE — PROCEDURES
Bladder scan was completed by ALEX Bingham at 1016am. The residual amount of 543ml was resulted to [facility].

## 2021-05-24 LAB
ANION GAP SERPL CALCULATED.3IONS-SCNC: 8 MEQ/L (ref 8–16)
APTT: 37.3 SECONDS (ref 22–38)
APTT: 75.3 SECONDS (ref 22–38)
APTT: 75.6 SECONDS (ref 22–38)
BUN BLDV-MCNC: 18 MG/DL (ref 7–22)
CALCIUM SERPL-MCNC: 8.1 MG/DL (ref 8.5–10.5)
CHLORIDE BLD-SCNC: 108 MEQ/L (ref 98–111)
CO2: 25 MEQ/L (ref 23–33)
CREAT SERPL-MCNC: 0.7 MG/DL (ref 0.4–1.2)
ERYTHROCYTE [DISTWIDTH] IN BLOOD BY AUTOMATED COUNT: 14.5 % (ref 11.5–14.5)
ERYTHROCYTE [DISTWIDTH] IN BLOOD BY AUTOMATED COUNT: 57.2 FL (ref 35–45)
GFR SERPL CREATININE-BSD FRML MDRD: > 90 ML/MIN/1.73M2
GLUCOSE BLD-MCNC: 172 MG/DL (ref 70–108)
GLUCOSE BLD-MCNC: 179 MG/DL (ref 70–108)
GLUCOSE BLD-MCNC: 185 MG/DL (ref 70–108)
GLUCOSE BLD-MCNC: 244 MG/DL (ref 70–108)
HCT VFR BLD CALC: 32.2 % (ref 42–52)
HCT VFR BLD CALC: 32.4 % (ref 42–52)
HEMOGLOBIN: 9.5 GM/DL (ref 14–18)
HEMOGLOBIN: 9.6 GM/DL (ref 14–18)
INR BLD: 1.49 (ref 0.85–1.13)
INR BLD: 1.56 (ref 0.85–1.13)
LV EF: 55 %
LVEF MODALITY: NORMAL
MCH RBC QN AUTO: 32.2 PG (ref 26–33)
MCHC RBC AUTO-ENTMCNC: 29.5 GM/DL (ref 32.2–35.5)
MCV RBC AUTO: 109.2 FL (ref 80–94)
PLATELET # BLD: 348 THOU/MM3 (ref 130–400)
PMV BLD AUTO: 9.3 FL (ref 9.4–12.4)
POTASSIUM SERPL-SCNC: 3.9 MEQ/L (ref 3.5–5.2)
RBC # BLD: 2.95 MILL/MM3 (ref 4.7–6.1)
SODIUM BLD-SCNC: 141 MEQ/L (ref 135–145)
WBC # BLD: 10 THOU/MM3 (ref 4.8–10.8)

## 2021-05-24 PROCEDURE — 85730 THROMBOPLASTIN TIME PARTIAL: CPT

## 2021-05-24 PROCEDURE — 93306 TTE W/DOPPLER COMPLETE: CPT

## 2021-05-24 PROCEDURE — 51798 US URINE CAPACITY MEASURE: CPT

## 2021-05-24 PROCEDURE — 80048 BASIC METABOLIC PNL TOTAL CA: CPT

## 2021-05-24 PROCEDURE — 85014 HEMATOCRIT: CPT

## 2021-05-24 PROCEDURE — 82948 REAGENT STRIP/BLOOD GLUCOSE: CPT

## 2021-05-24 PROCEDURE — 6360000002 HC RX W HCPCS: Performed by: STUDENT IN AN ORGANIZED HEALTH CARE EDUCATION/TRAINING PROGRAM

## 2021-05-24 PROCEDURE — 99232 SBSQ HOSP IP/OBS MODERATE 35: CPT | Performed by: PHYSICIAN ASSISTANT

## 2021-05-24 PROCEDURE — 2580000003 HC RX 258: Performed by: STUDENT IN AN ORGANIZED HEALTH CARE EDUCATION/TRAINING PROGRAM

## 2021-05-24 PROCEDURE — 1200000000 HC SEMI PRIVATE

## 2021-05-24 PROCEDURE — 99233 SBSQ HOSP IP/OBS HIGH 50: CPT | Performed by: HOSPITALIST

## 2021-05-24 PROCEDURE — 85027 COMPLETE CBC AUTOMATED: CPT

## 2021-05-24 PROCEDURE — 97162 PT EVAL MOD COMPLEX 30 MIN: CPT

## 2021-05-24 PROCEDURE — 97530 THERAPEUTIC ACTIVITIES: CPT

## 2021-05-24 PROCEDURE — 85610 PROTHROMBIN TIME: CPT

## 2021-05-24 PROCEDURE — 85018 HEMOGLOBIN: CPT

## 2021-05-24 PROCEDURE — 97110 THERAPEUTIC EXERCISES: CPT

## 2021-05-24 PROCEDURE — 36415 COLL VENOUS BLD VENIPUNCTURE: CPT

## 2021-05-24 PROCEDURE — 6370000000 HC RX 637 (ALT 250 FOR IP): Performed by: STUDENT IN AN ORGANIZED HEALTH CARE EDUCATION/TRAINING PROGRAM

## 2021-05-24 RX ORDER — WARFARIN SODIUM 5 MG/1
5 TABLET ORAL DAILY
Status: DISCONTINUED | OUTPATIENT
Start: 2021-05-24 | End: 2021-05-24 | Stop reason: ALTCHOICE

## 2021-05-24 RX ADMIN — CEFTRIAXONE SODIUM 1000 MG: 1 INJECTION, POWDER, FOR SOLUTION INTRAMUSCULAR; INTRAVENOUS at 22:00

## 2021-05-24 RX ADMIN — HEPARIN SODIUM 16 UNITS/KG/HR: 10000 INJECTION, SOLUTION INTRAVENOUS at 04:35

## 2021-05-24 RX ADMIN — HEPARIN SODIUM 19 UNITS/KG/HR: 10000 INJECTION, SOLUTION INTRAVENOUS at 19:50

## 2021-05-24 RX ADMIN — Medication 2000 UNITS: at 10:05

## 2021-05-24 RX ADMIN — FINASTERIDE 5 MG: 5 TABLET, FILM COATED ORAL at 10:05

## 2021-05-24 RX ADMIN — METOPROLOL SUCCINATE 100 MG: 100 TABLET, EXTENDED RELEASE ORAL at 10:05

## 2021-05-24 RX ADMIN — PAROXETINE HYDROCHLORIDE 40 MG: 20 TABLET, FILM COATED ORAL at 10:05

## 2021-05-24 RX ADMIN — MULTIPLE VITAMINS W/ MINERALS TAB 1 TABLET: TAB at 10:05

## 2021-05-24 RX ADMIN — HEPARIN SODIUM 5000 UNITS: 1000 INJECTION, SOLUTION INTRAVENOUS; SUBCUTANEOUS at 19:36

## 2021-05-24 RX ADMIN — TAMSULOSIN HYDROCHLORIDE 0.4 MG: 0.4 CAPSULE ORAL at 10:05

## 2021-05-24 RX ADMIN — MICONAZOLE NITRATE: 20 POWDER TOPICAL at 19:51

## 2021-05-24 RX ADMIN — INSULIN LISPRO 2 UNITS: 100 INJECTION, SOLUTION INTRAVENOUS; SUBCUTANEOUS at 16:50

## 2021-05-24 RX ADMIN — INSULIN LISPRO 4 UNITS: 100 INJECTION, SOLUTION INTRAVENOUS; SUBCUTANEOUS at 12:08

## 2021-05-24 RX ADMIN — MICONAZOLE NITRATE: 20 POWDER TOPICAL at 10:05

## 2021-05-24 ASSESSMENT — ENCOUNTER SYMPTOMS
COUGH: 0
DIARRHEA: 0
CONSTIPATION: 0
SORE THROAT: 0
BACK PAIN: 0
VOMITING: 0
ABDOMINAL PAIN: 0
SHORTNESS OF BREATH: 0
NAUSEA: 0

## 2021-05-24 ASSESSMENT — PAIN SCALES - GENERAL
PAINLEVEL_OUTOF10: 0

## 2021-05-24 NOTE — PROGRESS NOTES
Comprehensive Nutrition Assessment    Type and Reason for Visit:  Initial, Positive Nutrition Screen (wound)    Nutrition Recommendations/Plan:   Continue carb controlled diet  Continue MVI daily    Nutrition Assessment:  Pt. nutritionally compromised AEB wounds. At risk for further nutrition compromise r/t admitted with UTI, recent fall onto bottom resulting in tear vs DTI nonblanchable, increased nutrient needs for healing, advanced age, obese, and underlying medical condition (hx: former smoker, depression, DM, HLD, HTN, PE). Nutrition recommendations/interventions as per above. Malnutrition Assessment:  Malnutrition Status:  No malnutrition    Context:  Acute Illness     Findings of the 6 clinical characteristics of malnutrition:  Energy Intake:  No significant decrease in energy intake  Weight Loss:  No significant weight loss     Body Fat Loss:  No significant body fat loss     Muscle Mass Loss:  No significant muscle mass loss    Fluid Accumulation:  1 - Mild (+2 edema)     Strength:  Not Performed    Estimated Daily Nutrient Needs:  Energy (kcal):  ~2651-3916 (~13-15 kcals/kg); Weight Used for Energy Requirements:  Current (131kg - 5/24)     Protein (g):  140 (2.0 grams/kg); Weight Used for Protein Requirements:  Ideal (70kg - ideal)          Nutrition Related Findings:  Patient with UTI; +Bladder stone. Note patient also fell onto his bottom a few days ago. Patient seen with wife today and reports that his appetite is great. Patient loves the food here and loves veggies. Patient says that he is good with the carb controlled diet and does not have any questions. POC: 244. Meds: coumadin, ATB, vitamin D, MVI, humalog. Wounds:   (sacrum tear vs DTI nonblanchable)       Current Nutrition Therapies:    DIET CARB CONTROL;     Anthropometric Measures:  · Height: 5' 8\" (172.7 cm) (per patient)  · Current Body Weight: 287 lb (130.2 kg) (5/24/21, +2 edema)   · Admission Body Weight: 272 lb (123.4 kg)

## 2021-05-24 NOTE — PROGRESS NOTES
PROGRESS NOTE      Patient:  Sky Estrada. Unit/Bed:6-27/027-A    YOB: 1940    MRN: [id number]       Acct: [de-identified]     PCP: Vipul Tamez MD    Date of Admission: 5/22/2021      Assessment/Plan:    Anticipated Discharge: pending clinical improvement    Active Hospital Problems    Diagnosis Date Noted    Acute cystitis with hematuria [N30.01]     Calculi, bladder, diverticulum [N21.0] 05/22/2021       Gross hematuria  Non-obstructing Bilateral Nephrolithiasis and Cystolithiasis  -Per CT abdomen and pelvis: 8 mm stone on the right and 2 mm stone on the left., Non-obstructing. Patient is asymptomatic and does not want to consider treatment at this time.   -Per CT scan, there is a 2 cm bladder stone, which may contribute to urinary retention. -Urology consulted - appreciate recommendations. \"PVR yesterday was 543 ml. Recommend reilly catheter insertion only if patient symptomatic. This retention is likely a longstanding problem. Creatinine stable at 0.7. Will need outpatient cystoscopy for further evaluation. \"  -Continue Flomax    Acute cystitis with growth of gram positive cocci on urine culture  -Low clinical suspicion for sepsis   -Continue Ceftriaxone pending final culture and sensitivity. Suspected Large Lower Right Flank and Hemipelvis Hematoma, as seen on CTA  -Per urology, recommend conservative management at this time. -Monitor H&H as Hemoglobin 13.2 (5/5) --> 11.1 (admission) --> 9.5 this am.  -Coumadin held and currently on heparin. Pharmacy to dose coumadin when transition.     Urinary Retention  - mL 5/23/21.   -Urology consulted. Hold catheter unless symptomatic. 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 Persistent Atrial Fibrillation on Coumadin  -INR 1.81 on admission, thought was recently elevated > 3 per patient.  Follows in coumadin clinic  -EKG reviewed: Atrial Fibrillation with RVR (rate 110) with improved rate to 80 in ED  -Echo 5/24/21: Normal left ventricle size and systolic function. Ejection fraction was   estimated at 55 %. Mild AS. -Continue home Toprol  -Currently on heparin while coumadin is being held per urology recommendation  -Daily INR. Continue telemetry monitoring. Essential Hypertension  -Continue home Toprol. Lisinopril on hold pending evaluation for urological procedures.     Non-insulin dependent diabetes mellitus, type II  -Hemoglobin A1C 03/2021 7.6  -Blood sugar elevated 225 on admission  -Holding Nesina and Metformin  -Continue SSI, Accu-checks, hypoglycemia protocol     Physical deconditioning / history of frequent falls  -PT/OT to evaluate and treat  -Social work for [facility] rehab vs SNF on discharge.     Congenital Talipes Equinovarus, right   -Continue ankle bracing    Hyperlipidemia  -Continue home Vytorin    Depression  -Continue home Paxil    Obesity Body mass index is 45.05 kg/m². -Noted per history     Code Status: Full Code     Discharge planning:  Pending clinical improvement. Return to home vs IP rehab vs SNF on discharge.        Chief Complaint: Dark-colored urine    Hospital Course: Admitted 5/22/2021. Per initial admissino H&P: \"72 y. o. male who presented to [address] [place] [address] with dark urine. Patient states that today he noticed his urine was \"brown\". He denies any clots of bright red blood. He notes that he has had some intermittent dysuria over the past couple of days. Patient has also had increased urinary urgency and frequency. He denies back pain or abdominal pain. Patient also complains of fatigue that has been going on for weeks gradually. In the past patient has had issues with hematuria while on his coumadin. He notes that he recently had an INR that was > 3 and they had bumped down his warfarin dose. Patient denies any history of GI bleed.     In the ED patient initially had Atrial Fibrillation with RVR.  Heart rate [Held by provider] aspirin EC  81 mg Oral Daily    Vitamin D  2,000 Units Oral Daily    finasteride  5 mg Oral Daily    insulin lispro  0-12 Units Subcutaneous TID WC    insulin lispro  0-6 Units Subcutaneous Nightly    miconazole   Topical BID    therapeutic multivitamin-minerals  1 tablet Oral Daily    ezetimibe-simvastatin  1 tablet Oral Nightly     PRN Meds: sodium chloride flush, sodium chloride, acetaminophen, glucose, dextrose, glucagon (rDNA), dextrose, heparin (porcine), heparin (porcine)      Intake/Output Summary (Last 24 hours) at 5/24/2021 0716  Last data filed at 5/24/2021 0441  Gross per 24 hour   Intake 1478.54 ml   Output 1350 ml   Net 128.54 ml       Diet:  DIET CARB CONTROL; Exam:  /60   Pulse 90   Temp 98 °F (36.7 °C) (Oral)   Resp 22   Ht 5' 8\" (1.727 m) Comment: per patient  Wt 287 lb 14.4 oz (130.6 kg)   SpO2 97%   BMI 43.78 kg/m²     Physical Exam  Vitals reviewed. Constitutional:       General: He is not in acute distress. Appearance: Normal appearance. He is obese. HENT:      Head: Normocephalic and atraumatic. Right Ear: External ear normal.      Left Ear: External ear normal.      Nose: Nose normal.      Mouth/Throat:      Mouth: Mucous membranes are moist.   Eyes:      Conjunctiva/sclera: Conjunctivae normal.   Cardiovascular:      Rate and Rhythm: Normal rate. Rhythm irregular. Pulses: Normal pulses. Heart sounds: Normal heart sounds. No murmur heard. Pulmonary:      Effort: Pulmonary effort is normal. No respiratory distress. Breath sounds: Normal breath sounds. No wheezing, rhonchi or rales. Abdominal:      General: Abdomen is flat. Bowel sounds are normal. There is no distension. Palpations: Abdomen is soft. Tenderness: There is no abdominal tenderness. Musculoskeletal:      Cervical back: Normal range of motion and neck supple. Right lower leg: Edema present. Left lower leg: Edema present.       Comments: Right ankle brace intact   Skin:     General: Skin is warm and dry. Capillary Refill: Capillary refill takes less than 2 seconds. Neurological:      General: No focal deficit present. Mental Status: He is alert and oriented to person, place, and time. Psychiatric:         Mood and Affect: Mood normal.         Behavior: Behavior normal.           Labs:   Recent Labs     05/23/21  0659 05/23/21  1837 05/24/21  0534   WBC 10.4 11.2* 10.0   HGB 10.6* 10.0* 9.5*   HCT 37.3* 33.2* 32.2*    367 348     Recent Labs     05/23/21  0328 05/23/21  0659 05/24/21  0534    140 141   K 3.9 4.3 3.9    105 108   CO2 25 27 25   BUN 25* 23* 18   CREATININE 0.6 0.6 0.7   CALCIUM 8.6 8.6 8.1*     Recent Labs     05/22/21 2015   AST 17   ALT 15   BILIDIR <0.2   BILITOT 0.6   ALKPHOS 69     Recent Labs     05/23/21  0328 05/23/21  0659 05/24/21  0534   INR 1.72* 1.69* 1.56*     Recent Labs     05/22/21 2015   CKTOTAL 103       Urinalysis:      Lab Results   Component Value Date    NITRU see below 05/22/2021    WBCUA 25-50 05/22/2021    WBCUA 6-10 05/30/2015    BACTERIA MANY 05/22/2021    RBCUA 50-75 05/22/2021    BLOODU see below 05/22/2021    SPECGRAV see below 05/22/2021    GLUCOSEU Negative 05/30/2015       Radiology:  CT ABDOMEN PELVIS WO CONTRAST Additional Contrast? None   Final Result   Impression:   1. Motion affected study. 2.  Nonobstructing calculi both kidneys and in the urinary bladder. Acute    cystitis. 7 cm and 2.4 cm urinary bladder diverticuli. 3.  Large mass in the dorsal subcutaneous tissues of the lower right flank    and hemipelvis most suggestive of a hematoma which measures approximately    10 x 12 x 19 cm. Recommend clinical correlation. 4.  No bowel obstruction or ascites. Colonic diverticulosis without acute    diverticulitis. Probable proctitis. This document has been electronically signed by: Kiko Crawford.  DO Drew on    05/22/2021 10:48 PM      All CTs at this

## 2021-05-24 NOTE — PROGRESS NOTES
[address] [place] [address]  INPATIENT PHYSICAL THERAPY  EVALUATION  [facility] RENAL TELEMETRY 6K - [id number]-39/661-X    Time In: 0392  Time Out: 1400  Timed Code Treatment Minutes: 24 Minutes  Minutes: 48          Date: 2021  Patient Name: Garland Asencio,  Gender:  male        MRN: [id number]  : 1940  (68 y.o.)      Referring Practitioner: Ayad Kearney DO  Diagnosis: Calculi, bladder, diverticulum  Additional Pertinent Hx: Per initial admissino H&P: \"72 y.o. male who presented to [address] [place] [address] with dark urine. Patient states that today he noticed his urine was \"brown\". He denies any clots of bright red blood. He notes that he has had some intermittent dysuria over the past couple of days. Patient has also had increased urinary urgency and frequency. He denies back pain or abdominal pain. Patient also complains of fatigue that has been going on for weeks gradually. In the past patient has had issues with hematuria while on his coumadin. He notes that he recently had an INR that was > 3 and they had bumped down his warfarin dose. Patient denies any history of GI bleed. In the ED patient initially had Atrial Fibrillation with RVR. Heart rate improved into 80s. Patient urine was found to be dark and UA was skewed; however, did have pyuria and hematuria. WBC elevated > 12. Lactic WNL. Patient has urine that is dark brown/black on exam. Patient appears in no acute distress. INR 1.8 on admission. \"     Restrictions/Precautions:  Restrictions/Precautions: Weight Bearing, Fall Risk  Position Activity Restriction  Other position/activity restrictions: Pt has a R club foot    Subjective:  Chart Reviewed: Yes  Patient assessed for rehabilitation services?: Yes  Subjective: Pt was sitting upright in bed. Pt is very interested in therapy, to improve functional mobility. Upon sitting up pt began to bleed. However, the bleeding stopped fairly quickly.  A change of sheets were made with assistance from the included skilled education and facilitation of tasks to increase safety and independence with functional mobility for improved independence and quality of life. Assessment: Body structures, Functions, Activity limitations: Decreased functional mobility , Decreased balance, Decreased ROM, Decreased strength, Decreased endurance, Decreased safe awareness  Assessment: Pt reports to PT with Calculi, bladder, diverticulum. The pt is unable to conduct bed mobility and transfers without assistance. The pt wishes to be able to do this and be stronger. Skilled PT services would benefit this pt to improve functional mobility, balance, strength, and endurance to maximize independence. Prognosis: Good    REQUIRES PT FOLLOW UP: Yes    Discharge Recommendations:  Discharge Recommendations: Continue to assess pending progress, Subacute/Skilled Nursing Facility    Patient Education:  PT Education: Goals, PT Role, Plan of Care, Home Exercise Program, Precautions    Equipment Recommendations:  Equipment Needed: No    Plan:  Times per week: 3-5x GM  Current Treatment Recommendations: Strengthening, Functional Mobility Training, ROM, Transfer Training, Balance Training, Endurance Training, Positioning    Goals:  Patient goals : go home  Short term goals  Short term goal 1: Pt will supine<>sit with Min assist x1 in order to be able to get out of bed  Short term goal 2: Pt will roll to the L and R with CGA in order to change postion and relieve pressure. Short term goal 3: PT to assess out of bed transfers  Long term goals  Time Frame for Long term goals : No long term goals set due to short ELOS    Following session, patient left in safe position with all fall risk precautions in place.       Eun Churchill, SPT

## 2021-05-24 NOTE — PROGRESS NOTES
Clinical Pharmacy Note    Mallory Siu is a 68 y.o. male for whom pharmacy has been asked to manage warfarin therapy. Consulting Physician: Dr. To Mckeon  Warfarin dose prior to admission: 5 mg every Wednesday and 7.5 mg all other days  Warfarin indication: h/o PE  Target INR range: 2-3   Outpatient warfarin provider: [facility]    Past Medical History:   Diagnosis Date    Allergic rhinitis     Atrial fibrillation ([address] 2/2014    Depression     Diabetes mellitus type II 1/2012    diagnosed with HgA1c 6.6    Erectile dysfunction     Hyperlipidemia     Hypertension     Lower extremity edema     LV dysfunction     h/o, normal now    Morbid obesity (HCC)     Osteoarthritis     left knee, right foot, back    PE (pulmonary embolism) 2/2014              Recent Labs     05/24/21  1245   INR 1.49*     Recent Labs     05/23/21  0659 05/23/21  1837 05/24/21  0534   HGB 10.6* 10.0* 9.5*   HCT 37.3* 33.2* 32.2*    367 348     Current warfarin drug-drug interactions: heparin drip    Date INR Warfarin Dose   5/24/2021 1.49 Hold today per Urology                                   Daily PT/INR until stable within therapeutic range. Thank you for the consult.

## 2021-05-24 NOTE — CARE COORDINATION
5/24/21, 8:42 AM EDT  DISCHARGE PLANNING EVALUATION:    Rosario Mora Admitted: [address] day: 2   Location: -27/027-A Reason for admit: Calculi, bladder, diverticulum [N21.0]   PMH:  has a past medical history of Allergic rhinitis, Atrial fibrillation ([address] Depression, Diabetes mellitus type II, Erectile dysfunction, Hyperlipidemia, Hypertension, Lower extremity edema, LV dysfunction, Morbid obesity ([address] Osteoarthritis, and PE (pulmonary embolism). Procedure: CT Abdomen Pelvis WO Contrast Impression:   1.  Motion affected study. 2.  Nonobstructing calculi both kidneys and in the urinary bladder.  Acute   cystitis.  7 cm and 2.4 cm urinary bladder diverticuli. 3.  Large mass in the dorsal subcutaneous tissues of the lower right flank   and hemipelvis most suggestive of a hematoma which measures approximately   10 x 12 x 19 cm.  Recommend clinical correlation. 4.  No bowel obstruction or ascites.  Colonic diverticulosis without acute   diverticulitis.  Probable proctitis    ECHO      Barriers to Discharge:  From ED. Urine (+) gram (+) cocci, Urology consult, diabetes management, PT/OT, KAYLAN hose, IV fluids, IV Rocephin, Heparin drip. PCP: Allyssa Carlton MD  Readmission Risk Score: 16%    Patient Goals/Plan/Treatment Preferences: Met with Michael De Los Santos. He currently lives at home with his wife. Plan is ECF at discharge. [facility] per . Refer to  note. Transportation/Food Security/Housekeeping Addressed:  No issues identified.

## 2021-05-24 NOTE — PROGRESS NOTES
No calf tenderness. Skin: No jaundice or obvious rashes. Neurologic: CN 2-12 grossly intact. Psychiatric: Appropriate thought processes. Alert and oriented x 3. In NAD. Assessment and Plan  1. Nephrolithiasis- Per CT abdomen and pelvis. 8 mm stone on the right and 2 mm stone on the left. Non-obstructing. Patient is asymptomatic and does not want to consider treatment at this time.     2. Bladder stone- Per CT scan, there is a 2 cm bladder stone. May be contributing to his urinary retention. PVR yesterday was 543 ml. Recommend reilly catheter insertion only if patient symptomatic. This retention is likely a longstanding problem. Creatinine stable at 0.7. Will need outpatient cystoscopy for further evaluation.     3. Urinary Retention-  ml yesterday. Will obtain PVR this morning. Hold catheter unless symptomatic.     4. Bladder Diverticulum x 2- Will need outpatient cystoscopy for further evaluation.      5. Bladder Wall Thickening- Will need outpatient cystoscopy for further evaluation.     6. Pelvic/Lower Flank Hematoma- Recommend conservative management only at this time, unless strongly suspect abscess. . Holding Coumadin. Hgb at 9.5. Admission Hgb 11.1. Hgb on 5/5/21 was 13.2. Continue serial H/H. Closely monitor.     7. Acute Cystitis- Gram positive cocci in clusters noted on preliminary culture. On Rocephin.      8. Persistent Afib- Coumadin on hold. On Heparin.     Discussed with Dr. Racheal Goddard PA-C  5/24/21

## 2021-05-24 NOTE — CARE COORDINATION
DISCHARGE/PLANNING EVALUATION  5/24/21, 3:15 PM EDT    Reason for Referral: wife and patient would like to discuss possibility of inpatient rehab or snf  Mental Status: Patient is alert and oriented. Decision Making: Patient is making own decisions. Family/Social/Home Environment: Assessment completed with Patient and spouse. Patient stated that they were struggling at home due to him not being able to move. They reside in a mobile home with a ramp to enter. Patient has a wheelchair and walker at home and was using his walker prior to getting too weak. Spouse has macular degeneration and struggles to see well. They need ECF for rehab. Current Services including food security, transportation and housekeeping: assisted by spouse  Current Equipment: walker, wheelchair, bedside commode, ramp to enter. Payment Source: Medicare, tricare medicare  Concerns or Barriers to Discharge: not at this time  Post acute provider list with quality measures, geographic area and applicable managed care information provided. Questions regarding selection process answered: offered and reviewed, prefer [facility] (no beds available) or [facility] (referral made). Teach Back Method used with Patient regarding care plan and discharge plan. Patient and spouse verbalize understanding of the plan of care and contribute to goal setting. Patient goals, treatment preferences and discharge plan: Patient needs ECF at discharge, referral made to [facility] due to no beds available at [facility].      Electronically signed by CARLOTTA Hays, WILLY on 5/24/2021 at 3:15 PM

## 2021-05-25 LAB
ANION GAP SERPL CALCULATED.3IONS-SCNC: 8 MEQ/L (ref 8–16)
APTT: 111.2 SECONDS (ref 22–38)
APTT: 83.3 SECONDS (ref 22–38)
APTT: 91.3 SECONDS (ref 22–38)
APTT: 94.8 SECONDS (ref 22–38)
BUN BLDV-MCNC: 14 MG/DL (ref 7–22)
CALCIUM SERPL-MCNC: 8.6 MG/DL (ref 8.5–10.5)
CHLORIDE BLD-SCNC: 108 MEQ/L (ref 98–111)
CO2: 26 MEQ/L (ref 23–33)
CREAT SERPL-MCNC: 0.6 MG/DL (ref 0.4–1.2)
ERYTHROCYTE [DISTWIDTH] IN BLOOD BY AUTOMATED COUNT: 14.5 % (ref 11.5–14.5)
ERYTHROCYTE [DISTWIDTH] IN BLOOD BY AUTOMATED COUNT: 56.5 FL (ref 35–45)
GFR SERPL CREATININE-BSD FRML MDRD: > 90 ML/MIN/1.73M2
GLUCOSE BLD-MCNC: 177 MG/DL (ref 70–108)
GLUCOSE BLD-MCNC: 192 MG/DL (ref 70–108)
GLUCOSE BLD-MCNC: 193 MG/DL (ref 70–108)
GLUCOSE BLD-MCNC: 253 MG/DL (ref 70–108)
GLUCOSE BLD-MCNC: 269 MG/DL (ref 70–108)
HCT VFR BLD CALC: 31.6 % (ref 42–52)
HCT VFR BLD CALC: 32.7 % (ref 42–52)
HEMOGLOBIN: 9 GM/DL (ref 14–18)
HEMOGLOBIN: 9.7 GM/DL (ref 14–18)
INR BLD: 1.29 (ref 0.85–1.13)
MCH RBC QN AUTO: 32 PG (ref 26–33)
MCHC RBC AUTO-ENTMCNC: 29.7 GM/DL (ref 32.2–35.5)
MCV RBC AUTO: 107.9 FL (ref 80–94)
ORGANISM: ABNORMAL
PLATELET # BLD: 351 THOU/MM3 (ref 130–400)
PMV BLD AUTO: 8.7 FL (ref 9.4–12.4)
POTASSIUM SERPL-SCNC: 4.7 MEQ/L (ref 3.5–5.2)
RBC # BLD: 3.03 MILL/MM3 (ref 4.7–6.1)
REASON FOR REJECTION: NORMAL
REJECTED TEST: NORMAL
SODIUM BLD-SCNC: 142 MEQ/L (ref 135–145)
URINE CULTURE, ROUTINE: ABNORMAL
WBC # BLD: 8.3 THOU/MM3 (ref 4.8–10.8)

## 2021-05-25 PROCEDURE — 85018 HEMOGLOBIN: CPT

## 2021-05-25 PROCEDURE — 51798 US URINE CAPACITY MEASURE: CPT

## 2021-05-25 PROCEDURE — 6360000002 HC RX W HCPCS: Performed by: STUDENT IN AN ORGANIZED HEALTH CARE EDUCATION/TRAINING PROGRAM

## 2021-05-25 PROCEDURE — 85014 HEMATOCRIT: CPT

## 2021-05-25 PROCEDURE — 85610 PROTHROMBIN TIME: CPT

## 2021-05-25 PROCEDURE — 1200000000 HC SEMI PRIVATE

## 2021-05-25 PROCEDURE — 51700 IRRIGATION OF BLADDER: CPT

## 2021-05-25 PROCEDURE — 99233 SBSQ HOSP IP/OBS HIGH 50: CPT | Performed by: HOSPITALIST

## 2021-05-25 PROCEDURE — 85730 THROMBOPLASTIN TIME PARTIAL: CPT

## 2021-05-25 PROCEDURE — 6370000000 HC RX 637 (ALT 250 FOR IP): Performed by: STUDENT IN AN ORGANIZED HEALTH CARE EDUCATION/TRAINING PROGRAM

## 2021-05-25 PROCEDURE — 80048 BASIC METABOLIC PNL TOTAL CA: CPT

## 2021-05-25 PROCEDURE — 85027 COMPLETE CBC AUTOMATED: CPT

## 2021-05-25 PROCEDURE — 82948 REAGENT STRIP/BLOOD GLUCOSE: CPT

## 2021-05-25 PROCEDURE — 36415 COLL VENOUS BLD VENIPUNCTURE: CPT

## 2021-05-25 RX ORDER — FLUTICASONE PROPIONATE 50 MCG
1 SPRAY, SUSPENSION (ML) NASAL DAILY
Status: DISCONTINUED | OUTPATIENT
Start: 2021-05-25 | End: 2021-05-29 | Stop reason: HOSPADM

## 2021-05-25 RX ORDER — LIDOCAINE HYDROCHLORIDE 20 MG/ML
JELLY TOPICAL PRN
Status: DISCONTINUED | OUTPATIENT
Start: 2021-05-25 | End: 2021-05-29 | Stop reason: HOSPADM

## 2021-05-25 RX ORDER — CEFDINIR 300 MG/1
300 CAPSULE ORAL EVERY 12 HOURS SCHEDULED
Status: DISCONTINUED | OUTPATIENT
Start: 2021-05-25 | End: 2021-05-29 | Stop reason: HOSPADM

## 2021-05-25 RX ADMIN — PAROXETINE HYDROCHLORIDE 40 MG: 20 TABLET, FILM COATED ORAL at 07:38

## 2021-05-25 RX ADMIN — HEPARIN SODIUM 17 UNITS/KG/HR: 10000 INJECTION, SOLUTION INTRAVENOUS at 18:46

## 2021-05-25 RX ADMIN — INSULIN LISPRO 6 UNITS: 100 INJECTION, SOLUTION INTRAVENOUS; SUBCUTANEOUS at 16:32

## 2021-05-25 RX ADMIN — MICONAZOLE NITRATE: 20 POWDER TOPICAL at 07:38

## 2021-05-25 RX ADMIN — MULTIPLE VITAMINS W/ MINERALS TAB 1 TABLET: TAB at 07:38

## 2021-05-25 RX ADMIN — FINASTERIDE 5 MG: 5 TABLET, FILM COATED ORAL at 07:38

## 2021-05-25 RX ADMIN — TAMSULOSIN HYDROCHLORIDE 0.4 MG: 0.4 CAPSULE ORAL at 07:38

## 2021-05-25 RX ADMIN — Medication 2000 UNITS: at 07:38

## 2021-05-25 RX ADMIN — METOPROLOL SUCCINATE 100 MG: 100 TABLET, EXTENDED RELEASE ORAL at 07:38

## 2021-05-25 RX ADMIN — EZETIMIBE AND SIMVASTATIN 1 TABLET: 10; 40 TABLET ORAL at 21:16

## 2021-05-25 RX ADMIN — CEFDINIR 300 MG: 300 CAPSULE ORAL at 20:43

## 2021-05-25 RX ADMIN — MICONAZOLE NITRATE: 20 POWDER TOPICAL at 20:58

## 2021-05-25 RX ADMIN — HEPARIN SODIUM 19 UNITS/KG/HR: 10000 INJECTION, SOLUTION INTRAVENOUS at 07:21

## 2021-05-25 RX ADMIN — INSULIN LISPRO 6 UNITS: 100 INJECTION, SOLUTION INTRAVENOUS; SUBCUTANEOUS at 12:46

## 2021-05-25 RX ADMIN — FLUTICASONE PROPIONATE 1 SPRAY: 50 SPRAY, METERED NASAL at 13:36

## 2021-05-25 ASSESSMENT — ENCOUNTER SYMPTOMS
COUGH: 0
SORE THROAT: 0
SHORTNESS OF BREATH: 0
NAUSEA: 0
ABDOMINAL PAIN: 0
VOMITING: 0
DIARRHEA: 0
BACK PAIN: 0
BLOOD IN STOOL: 0
CONSTIPATION: 0

## 2021-05-25 ASSESSMENT — PAIN SCALES - GENERAL
PAINLEVEL_OUTOF10: 0

## 2021-05-25 NOTE — CARE COORDINATION
5/25/21, 2:46 PM EDT    DISCHARGE PLANNING EVALUATION    Follow up visit with pt and spouse. Advised that [facility] does not have beds. Pt requesting referral to [facility]. HEIKE advised that [facility] is still reviewing pts chart. HEIKE left message with Ervin Mar with [phone] regarding referral.     3:07 PM update:  Referral completed with Ervin Mar with [address].

## 2021-05-25 NOTE — PROGRESS NOTES
[facility] [employer] THERAPY MISSED TREATMENT NOTE  STR[facility] RENAL TELEMETRY 6K  6K-27/027-A      Date: 2021  Patient Name: Herson Hernández        CSN: [id number]   : 1940  (68 y.o.)  Gender: male                REASON FOR MISSED TREATMENT: OT attempted to see 2x ths date. 1st attempt pt was requesting to rest d/t not sleeping well last night. 2nd attempt in PM RN reports pt was a medical hold d/t  pt just started on CBI. OT will see as time allows on 21.

## 2021-05-25 NOTE — PROGRESS NOTES
[facility]  PHYSICAL THERAPY MISSED TREATMENT NOTE  STRZ RENAL TELEMETRY 6K    Date: 2021  Patient Name: Tasha Jameson MRN: [id number]   : 1940  (76 y.o.)  Gender: male   Referring Practitioner: Crispin Christine DO  Diagnosis: Calculi, bladder, diverticulum         REASON FOR MISSED TREATMENT:   According to RN, pt just began on CBI, limiting his mobility. Pt requests having services tomorrow.  PT will visit Wednesday     [facility], [facility]

## 2021-05-25 NOTE — CARE COORDINATION
5/25/21, 11:05 AM EDT    DISCHARGE ON GOING EVALUATION    Gladys Eldridge. Hospital day: 3  Location: [facility]27/027-A Reason for admit: Calculi, bladder, diverticulum [N21.0]   Procedure: none  Barriers to Discharge: waiting on urine cx. Discharge disposition is undecided at this time, Emery Phill to revisit when wife is here. PCP: Shelbi Luke MD  Readmission Risk Score: 14%  Patient Goals/Plan/Treatment Preferences: ECF for rehab.

## 2021-05-25 NOTE — PROGRESS NOTES
PROGRESS NOTE      Patient:  Merissa Pedraza. Unit/Bed:6K-27/027-A    YOB: 1940    MRN: [id number]       Acct: [de-identified]     PCP: Tamiko Lundy MD    Date of Admission: 5/22/2021      Assessment/Plan:    Anticipated Discharge: pending clinical improvement    Active Hospital Problems    Diagnosis Date Noted    Acute cystitis with hematuria [N30.01]     Calculi, bladder, diverticulum [N21.0] 05/22/2021       Gross hematuria  Non-obstructing Bilateral Nephrolithiasis and Cystolithiasis  -Per CT abdomen and pelvis: 8 mm stone on the right and 2 mm stone on the left., Non-obstructing. Patient is asymptomatic and does not want to consider treatment at this time.   -Per CT scan, there is a 2 cm bladder stone, which may contribute to urinary retention. -Urology consulted - appreciate recommendations. -3-way reilly catheter inserted and continuous bladder irrigation initiated today  -Continue Flomax    Acute cystitis with growth of Granulicatella adiacens on urine culture  -Low clinical suspicion for sepsis   -Urine culture grew out Granulicatella adiacens - antibiotic switched to Cefdinir PO today (completed 3 days of Rocephin). Suspected Large Lower Right Flank and Hemipelvis Hematoma, as seen on CTA  -Per urology, recommend conservative management at this time. -Monitor H&H as Hemoglobin 13.2 (5/5) --> 11.1 (admission) --> 9.7 this am.  -Coumadin held and currently on heparin. Pharmacy to dose coumadin when ready for transition.     Urinary Retention  - mL 5/23/21 - 17 5/24/21 --> 384 today  -Urology on consult -- inserted 3-way reilly and began CBI today. 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 Persistent Atrial Fibrillation on Coumadin  -INR 1.81 on admission, thought was recently elevated > 3 per patient.  Follows in coumadin clinic  -EKG reviewed: Atrial Fibrillation with RVR (rate 110) with improved rate to 80 in ED  -Echo 5/24/21: Normal left ventricle size and systolic function. Ejection fraction was   estimated at 55 %. Mild AS. -Continue home Toprol  -Currently on heparin while coumadin is being held per urology recommendation as urine is still dark-colored  -Daily INR. Continue telemetry monitoring. Essential Hypertension  -Continue home Toprol and resumed home Ramipril.      Non-insulin dependent diabetes mellitus, type II  -Hemoglobin A1C 03/2021 7.6  -Blood sugar elevated 225 on admission  -Holding Nesina and Metformin  -Continue SSI, Accu-checks, hypoglycemia protocol     Physical deconditioning / history of frequent falls  -PT/OT to evaluate and treat  -Social work for [address] [employer] rehab vs SNF on discharge.     Congenital Talipes Equinovarus, right   -Continue ankle bracing    Hyperlipidemia  -Continue home Vytorin    Depression  -Continue home Paxil    Obesity Body mass index is 43.78 kg/m². -Noted per history     Code Status: Full Code     Discharge planning:  Pending clinical improvement. Return to home vs IP rehab vs SNF on discharge.        Chief Complaint: Dark-colored urine    Hospital Course: Admitted 5/22/2021. Per initial admissino H&P: \"72 y. o. male who presented to [address] [place] [address] with dark urine. Patient states that today he noticed his urine was \"brown\". He denies any clots of bright red blood. He notes that he has had some intermittent dysuria over the past couple of days. Patient has also had increased urinary urgency and frequency. He denies back pain or abdominal pain. Patient also complains of fatigue that has been going on for weeks gradually. In the past patient has had issues with hematuria while on his coumadin. He notes that he recently had an INR that was > 3 and they had bumped down his warfarin dose. Patient denies any history of GI bleed.     In the ED patient initially had Atrial Fibrillation with RVR. Heart rate improved into 80s.  Patient urine was 1,000 mg Intravenous Q24H    tamsulosin  0.4 mg Oral Daily    [Held by provider] metFORMIN  1,000 mg Oral Daily with breakfast    metoprolol succinate  100 mg Oral Daily    PARoxetine  40 mg Oral QAM    [Held by provider] ramipril  10 mg Oral Daily    [Held by provider] alogliptin  25 mg Oral Daily    [Held by provider] aspirin EC  81 mg Oral Daily    Vitamin D  2,000 Units Oral Daily    finasteride  5 mg Oral Daily    insulin lispro  0-12 Units Subcutaneous TID WC    insulin lispro  0-6 Units Subcutaneous Nightly    miconazole   Topical BID    therapeutic multivitamin-minerals  1 tablet Oral Daily    ezetimibe-simvastatin  1 tablet Oral Nightly     PRN Meds: sodium chloride flush, sodium chloride, acetaminophen, glucose, dextrose, glucagon (rDNA), dextrose, heparin (porcine), heparin (porcine)      Intake/Output Summary (Last 24 hours) at 5/25/2021 0727  Last data filed at 5/25/2021 0045  Gross per 24 hour   Intake 240 ml   Output 1600 ml   Net -1360 ml       Diet:  DIET CARB CONTROL; Exam:  BP (!) 140/95   Pulse 83   Temp 98.6 °F (37 °C) (Oral)   Resp 22   Ht 5' 8\" (1.727 m) Comment: per patient  Wt 287 lb 14.4 oz (130.6 kg)   SpO2 98%   BMI 43.78 kg/m²     Physical Exam  Vitals reviewed. Constitutional:       General: He is not in acute distress. Appearance: Normal appearance. He is obese. HENT:      Head: Normocephalic and atraumatic. Right Ear: External ear normal.      Left Ear: External ear normal.      Nose: Nose normal.      Mouth/Throat:      Mouth: Mucous membranes are moist.   Eyes:      Conjunctiva/sclera: Conjunctivae normal.   Cardiovascular:      Rate and Rhythm: Normal rate. Rhythm irregular. Pulses: Normal pulses. Heart sounds: Normal heart sounds. No murmur heard. Pulmonary:      Effort: Pulmonary effort is normal. No respiratory distress. Breath sounds: Normal breath sounds. No wheezing, rhonchi or rales.    Abdominal:      General: Abdomen is flat. Bowel sounds are normal. There is no distension. Palpations: Abdomen is soft. Tenderness: There is no abdominal tenderness. Musculoskeletal:      Cervical back: Normal range of motion and neck supple. Right lower leg: Edema present. Left lower leg: Edema present. Comments: Right ankle brace intact   Skin:     General: Skin is warm and dry. Capillary Refill: Capillary refill takes less than 2 seconds. Neurological:      General: No focal deficit present. Mental Status: He is alert and oriented to person, place, and time. Psychiatric:         Mood and Affect: Mood normal.         Behavior: Behavior normal.           Labs:   Recent Labs     05/23/21  1837 05/24/21  0534 05/24/21  1802 05/25/21  0625   WBC 11.2* 10.0  --  8.3   HGB 10.0* 9.5* 9.6* 9.7*   HCT 33.2* 32.2* 32.4* 32.7*    348  --  351     Recent Labs     05/23/21  0328 05/23/21  0659 05/24/21  0534    140 141   K 3.9 4.3 3.9    105 108   CO2 25 27 25   BUN 25* 23* 18   CREATININE 0.6 0.6 0.7   CALCIUM 8.6 8.6 8.1*     Recent Labs     05/22/21 2015   AST 17   ALT 15   BILIDIR <0.2   BILITOT 0.6   ALKPHOS 69     Recent Labs     05/24/21  0534 05/24/21  1245 05/25/21  0625   INR 1.56* 1.49* 1.29*     Recent Labs     05/22/21 2015   CKTOTAL 103       Urinalysis:      Lab Results   Component Value Date    NITRU see below 05/22/2021    WBCUA 25-50 05/22/2021    WBCUA 6-10 05/30/2015    BACTERIA MANY 05/22/2021    RBCUA 50-75 05/22/2021    BLOODU see below 05/22/2021    SPECGRAV see below 05/22/2021    GLUCOSEU Negative 05/30/2015       Radiology:  CT ABDOMEN PELVIS WO CONTRAST Additional Contrast? None   Final Result   Impression:   1. Motion affected study. 2.  Nonobstructing calculi both kidneys and in the urinary bladder. Acute    cystitis. 7 cm and 2.4 cm urinary bladder diverticuli.    3.  Large mass in the dorsal subcutaneous tissues of the lower right flank    and hemipelvis most

## 2021-05-25 NOTE — PROGRESS NOTES
Difficulty of Paying Living Expenses: Not hard at all   Food Insecurity: No Food Insecurity    Worried About Running Out of Food in the Last Year: Never true    Ran Out of Food in the Last Year: Never true   Transportation Needs: No Transportation Needs    Lack of Transportation (Medical): No    Lack of Transportation (Non-Medical): No   Physical Activity: Inactive    Days of Exercise per Week: 0 days    Minutes of Exercise per Session: 0 min   Stress: Stress Concern Present    Feeling of Stress : Very much   Social Connections: Moderately Integrated    Frequency of Communication with Friends and Family: More than three times a week    Frequency of Social Gatherings with Friends and Family: More than three times a week    Attends [employer] Services: More than 4 times per year    Active Member of [employer] or Organizations: No    Attends Club or Organization Meetings: Never    Marital Status:    Intimate Partner Violence:     Fear of Current or Ex-Partner:     Emotionally Abused:     Physically Abused:     Sexually Abused:      Family History   Problem Relation Age of Onset    Cancer Mother         esophagus    Cancer Father         colon    Cancer Brother      Allergies   Allergen Reactions    Lipitor Other (See Comments)     myalgia    Pcn [Penicillins] Rash         Constitutional: Alert and oriented times x3, no acute distress, and cooperative to examination with appropriate mood and affect. HEENT:   Head:         Normocephalic and atraumatic. Mucous membranes are normal.   Eyes:         EOM are normal.  Nose:    The external appearance of the nose is normal  Ears: The ears appear normal to external inspection. Cardiovascular:       Irregular rhythem  Pulmonary/Chest:  Normal respiratory rate and rhthym. Diminshed, wheeze   Abdominal:          Large, soft, nondistended, no CVA, no suprapubic tendnerness. Active bowel sounds.   Genitalia:    Voiding cherry red urine  Musculoskeletal:    Normal range of motion. He exhibits no edema or tenderness of lower extremities. Extremities:    No cyanosis, clubbing, or edema present. Neurological:    Alert and oriented. Labs:  WBC:    Lab Results   Component Value Date    WBC 8.3 05/25/2021     Hemoglobin/Hematocrit:    Lab Results   Component Value Date    HGB 9.7 05/25/2021    HCT 32.7 05/25/2021     BMP:    Lab Results   Component Value Date     05/25/2021    K 4.7 05/25/2021     05/25/2021    CO2 26 05/25/2021    BUN 14 05/25/2021    LABALBU 2.9 05/23/2021    CREATININE 0.6 05/25/2021    CALCIUM 8.6 05/25/2021    LABGLOM >90 05/25/2021           Assessment and Plan  Continue to hold Coumadin, Heparin ok at this time. If urine color does not improve, may need to hold Heparin if able  He is frequently voiding, 150-200 at a time. Urine is becoming darker red in color. Will insert 3 way reilly and start CBI    Procedure note:   20Fr 3 way reilly catheter inserted without difficulty using sterile technique. He tolerated well. Vigorously irrigated with 400ml of sterile solution, no clots expressed. CBI started. He expressed lower abdominal relief when bladder drained. 1. Nephrolithiasis- Per CT abdomen and pelvis. 8 mm stone on the right and 2 mm stone on the left. Non-obstructing. Patient is asymptomatic and does not want to consider treatment at this time.     2. Bladder stone- Per CT scan, there is a 2 cm bladder stone. May be contributing to his urinary retention. PVR yesterday was 18 ml.   Today 341ml. This retention is likely a longstanding problem. Creatinine stable at 0.6. Will need outpatient cystoscopy for further evaluation.     3. Urinary Retention-  ml today.      4. Bladder Diverticulum x 2- Will need outpatient cystoscopy for further evaluation.      5. Bladder Wall Thickening- Will need outpatient cystoscopy for further evaluation.     6. Pelvic/Lower Flank Hematoma- Recommend conservative management only at this time, unless strongly suspect abscess. . Holding Coumadin. Hgb at 9.7. Admission Hgb 11.1. Hgb on 5/5/21 was 13.2. Continue serial H/H. Closely monitor.     7. Acute Cystitis-   Organism Abnormal  05/22/2021  7:25 PM [address]    Urine Culture, Routine 05/22/2021  7:25  [facility] [address] [facility]   [place] count: >382,826 CFU/mL Granulicatella adaciens has shown susceptibility to Penicillin, Cefazolin, Minocycline and Vancomycin. It is typically resistant to Ciprofloxacin and Trimethoprim/sulfamethoxazole. Medicine stopped Rocephin and started Omnicef    8. Persistent Afib- Coumadin on hold. On Heparin.     LILIANA Pulido - CNP, APRN  05/25/21 10:16 AM  Urology

## 2021-05-26 ENCOUNTER — ANESTHESIA (OUTPATIENT)
Dept: OPERATING ROOM | Age: 81
DRG: 663 | End: 2021-05-26
Payer: MEDICARE

## 2021-05-26 ENCOUNTER — ANESTHESIA EVENT (OUTPATIENT)
Dept: OPERATING ROOM | Age: 81
DRG: 663 | End: 2021-05-26
Payer: MEDICARE

## 2021-05-26 ENCOUNTER — APPOINTMENT (OUTPATIENT)
Dept: PHARMACY | Age: 81
End: 2021-05-26
Payer: MEDICARE

## 2021-05-26 VITALS — TEMPERATURE: 66.9 F | SYSTOLIC BLOOD PRESSURE: 109 MMHG | OXYGEN SATURATION: 100 % | DIASTOLIC BLOOD PRESSURE: 67 MMHG

## 2021-05-26 LAB
ANION GAP SERPL CALCULATED.3IONS-SCNC: 9 MEQ/L (ref 8–16)
APTT: 103.9 SECONDS (ref 22–38)
APTT: 109.3 SECONDS (ref 22–38)
APTT: 72.5 SECONDS (ref 22–38)
BUN BLDV-MCNC: 10 MG/DL (ref 7–22)
CALCIUM SERPL-MCNC: 8.7 MG/DL (ref 8.5–10.5)
CHLORIDE BLD-SCNC: 107 MEQ/L (ref 98–111)
CO2: 24 MEQ/L (ref 23–33)
CREAT SERPL-MCNC: 0.7 MG/DL (ref 0.4–1.2)
ERYTHROCYTE [DISTWIDTH] IN BLOOD BY AUTOMATED COUNT: 14.6 % (ref 11.5–14.5)
ERYTHROCYTE [DISTWIDTH] IN BLOOD BY AUTOMATED COUNT: 59.2 FL (ref 35–45)
GFR SERPL CREATININE-BSD FRML MDRD: > 90 ML/MIN/1.73M2
GLUCOSE BLD-MCNC: 174 MG/DL (ref 70–108)
GLUCOSE BLD-MCNC: 197 MG/DL (ref 70–108)
GLUCOSE BLD-MCNC: 199 MG/DL (ref 70–108)
GLUCOSE BLD-MCNC: 206 MG/DL (ref 70–108)
GLUCOSE BLD-MCNC: 213 MG/DL (ref 70–108)
HCT VFR BLD CALC: 30.9 % (ref 42–52)
HCT VFR BLD CALC: 31.4 % (ref 42–52)
HEMOGLOBIN: 9 GM/DL (ref 14–18)
HEMOGLOBIN: 9.1 GM/DL (ref 14–18)
INR BLD: 1.19 (ref 0.85–1.13)
MCH RBC QN AUTO: 32.4 PG (ref 26–33)
MCHC RBC AUTO-ENTMCNC: 29 GM/DL (ref 32.2–35.5)
MCV RBC AUTO: 111.7 FL (ref 80–94)
PLATELET # BLD: 332 THOU/MM3 (ref 130–400)
PMV BLD AUTO: 8.6 FL (ref 9.4–12.4)
POTASSIUM SERPL-SCNC: 4.1 MEQ/L (ref 3.5–5.2)
RBC # BLD: 2.81 MILL/MM3 (ref 4.7–6.1)
SARS-COV-2, NAAT: NOT DETECTED
SODIUM BLD-SCNC: 140 MEQ/L (ref 135–145)
WBC # BLD: 10.2 THOU/MM3 (ref 4.8–10.8)

## 2021-05-26 PROCEDURE — 85730 THROMBOPLASTIN TIME PARTIAL: CPT

## 2021-05-26 PROCEDURE — 6370000000 HC RX 637 (ALT 250 FOR IP): Performed by: STUDENT IN AN ORGANIZED HEALTH CARE EDUCATION/TRAINING PROGRAM

## 2021-05-26 PROCEDURE — 85610 PROTHROMBIN TIME: CPT

## 2021-05-26 PROCEDURE — 87635 SARS-COV-2 COVID-19 AMP PRB: CPT

## 2021-05-26 PROCEDURE — 99232 SBSQ HOSP IP/OBS MODERATE 35: CPT | Performed by: UROLOGY

## 2021-05-26 PROCEDURE — 97110 THERAPEUTIC EXERCISES: CPT

## 2021-05-26 PROCEDURE — 85014 HEMATOCRIT: CPT

## 2021-05-26 PROCEDURE — 3700000001 HC ADD 15 MINUTES (ANESTHESIA): Performed by: UROLOGY

## 2021-05-26 PROCEDURE — 0W3R8ZZ CONTROL BLEEDING IN GENITOURINARY TRACT, VIA NATURAL OR ARTIFICIAL OPENING ENDOSCOPIC: ICD-10-PCS | Performed by: UROLOGY

## 2021-05-26 PROCEDURE — 97530 THERAPEUTIC ACTIVITIES: CPT

## 2021-05-26 PROCEDURE — 2709999900 HC NON-CHARGEABLE SUPPLY: Performed by: UROLOGY

## 2021-05-26 PROCEDURE — 7100000001 HC PACU RECOVERY - ADDTL 15 MIN: Performed by: UROLOGY

## 2021-05-26 PROCEDURE — 97166 OT EVAL MOD COMPLEX 45 MIN: CPT

## 2021-05-26 PROCEDURE — 85027 COMPLETE CBC AUTOMATED: CPT

## 2021-05-26 PROCEDURE — 3600000003 HC SURGERY LEVEL 3 BASE: Performed by: UROLOGY

## 2021-05-26 PROCEDURE — 6360000002 HC RX W HCPCS

## 2021-05-26 PROCEDURE — 3700000000 HC ANESTHESIA ATTENDED CARE: Performed by: UROLOGY

## 2021-05-26 PROCEDURE — 82948 REAGENT STRIP/BLOOD GLUCOSE: CPT

## 2021-05-26 PROCEDURE — 1200000000 HC SEMI PRIVATE

## 2021-05-26 PROCEDURE — 7100000000 HC PACU RECOVERY - FIRST 15 MIN: Performed by: UROLOGY

## 2021-05-26 PROCEDURE — 80048 BASIC METABOLIC PNL TOTAL CA: CPT

## 2021-05-26 PROCEDURE — 3600000013 HC SURGERY LEVEL 3 ADDTL 15MIN: Performed by: UROLOGY

## 2021-05-26 PROCEDURE — 51700 IRRIGATION OF BLADDER: CPT

## 2021-05-26 PROCEDURE — 85018 HEMOGLOBIN: CPT

## 2021-05-26 PROCEDURE — 0TCB8ZZ EXTIRPATION OF MATTER FROM BLADDER, VIA NATURAL OR ARTIFICIAL OPENING ENDOSCOPIC: ICD-10-PCS | Performed by: UROLOGY

## 2021-05-26 PROCEDURE — 2580000003 HC RX 258: Performed by: NURSE ANESTHETIST, CERTIFIED REGISTERED

## 2021-05-26 PROCEDURE — 2500000003 HC RX 250 WO HCPCS

## 2021-05-26 PROCEDURE — 6360000002 HC RX W HCPCS: Performed by: STUDENT IN AN ORGANIZED HEALTH CARE EDUCATION/TRAINING PROGRAM

## 2021-05-26 PROCEDURE — 97535 SELF CARE MNGMENT TRAINING: CPT

## 2021-05-26 PROCEDURE — 36415 COLL VENOUS BLD VENIPUNCTURE: CPT

## 2021-05-26 PROCEDURE — 99233 SBSQ HOSP IP/OBS HIGH 50: CPT | Performed by: FAMILY MEDICINE

## 2021-05-26 RX ORDER — FENTANYL CITRATE 50 UG/ML
INJECTION, SOLUTION INTRAMUSCULAR; INTRAVENOUS PRN
Status: DISCONTINUED | OUTPATIENT
Start: 2021-05-26 | End: 2021-05-26 | Stop reason: SDUPTHER

## 2021-05-26 RX ORDER — PHENYLEPHRINE HYDROCHLORIDE 10 MG/ML
INJECTION INTRAVENOUS PRN
Status: DISCONTINUED | OUTPATIENT
Start: 2021-05-26 | End: 2021-05-26 | Stop reason: SDUPTHER

## 2021-05-26 RX ORDER — ONDANSETRON 2 MG/ML
INJECTION INTRAMUSCULAR; INTRAVENOUS PRN
Status: DISCONTINUED | OUTPATIENT
Start: 2021-05-26 | End: 2021-05-26 | Stop reason: SDUPTHER

## 2021-05-26 RX ORDER — SODIUM CHLORIDE 9 MG/ML
INJECTION, SOLUTION INTRAVENOUS CONTINUOUS PRN
Status: DISCONTINUED | OUTPATIENT
Start: 2021-05-26 | End: 2021-05-26 | Stop reason: SDUPTHER

## 2021-05-26 RX ORDER — LIDOCAINE HCL/PF 100 MG/5ML
SYRINGE (ML) INJECTION PRN
Status: DISCONTINUED | OUTPATIENT
Start: 2021-05-26 | End: 2021-05-26 | Stop reason: SDUPTHER

## 2021-05-26 RX ORDER — DEXAMETHASONE SODIUM PHOSPHATE 10 MG/ML
INJECTION, EMULSION INTRAMUSCULAR; INTRAVENOUS PRN
Status: DISCONTINUED | OUTPATIENT
Start: 2021-05-26 | End: 2021-05-26 | Stop reason: SDUPTHER

## 2021-05-26 RX ORDER — CEFAZOLIN SODIUM 1 G/3ML
INJECTION, POWDER, FOR SOLUTION INTRAMUSCULAR; INTRAVENOUS PRN
Status: DISCONTINUED | OUTPATIENT
Start: 2021-05-26 | End: 2021-05-26 | Stop reason: SDUPTHER

## 2021-05-26 RX ORDER — SUCCINYLCHOLINE/SOD CL,ISO/PF 200MG/10ML
SYRINGE (ML) INTRAVENOUS PRN
Status: DISCONTINUED | OUTPATIENT
Start: 2021-05-26 | End: 2021-05-26 | Stop reason: SDUPTHER

## 2021-05-26 RX ORDER — PROPOFOL 10 MG/ML
INJECTION, EMULSION INTRAVENOUS PRN
Status: DISCONTINUED | OUTPATIENT
Start: 2021-05-26 | End: 2021-05-26 | Stop reason: SDUPTHER

## 2021-05-26 RX ADMIN — INSULIN LISPRO 4 UNITS: 100 INJECTION, SOLUTION INTRAVENOUS; SUBCUTANEOUS at 07:59

## 2021-05-26 RX ADMIN — MICONAZOLE NITRATE: 20 POWDER TOPICAL at 20:50

## 2021-05-26 RX ADMIN — FENTANYL CITRATE 50 MCG: 50 INJECTION, SOLUTION INTRAMUSCULAR; INTRAVENOUS at 17:13

## 2021-05-26 RX ADMIN — METOPROLOL SUCCINATE 100 MG: 100 TABLET, EXTENDED RELEASE ORAL at 07:57

## 2021-05-26 RX ADMIN — PROPOFOL 130 MG: 10 INJECTION, EMULSION INTRAVENOUS at 17:13

## 2021-05-26 RX ADMIN — Medication 120 MG: at 17:13

## 2021-05-26 RX ADMIN — PHENYLEPHRINE HYDROCHLORIDE 200 MCG: 10 INJECTION INTRAVENOUS at 17:22

## 2021-05-26 RX ADMIN — SODIUM CHLORIDE: 9 INJECTION, SOLUTION INTRAVENOUS at 17:06

## 2021-05-26 RX ADMIN — EZETIMIBE AND SIMVASTATIN 1 TABLET: 10; 40 TABLET ORAL at 20:50

## 2021-05-26 RX ADMIN — PHENYLEPHRINE HYDROCHLORIDE 200 MCG: 10 INJECTION INTRAVENOUS at 17:30

## 2021-05-26 RX ADMIN — MICONAZOLE NITRATE: 20 POWDER TOPICAL at 10:59

## 2021-05-26 RX ADMIN — PAROXETINE HYDROCHLORIDE 40 MG: 20 TABLET, FILM COATED ORAL at 07:58

## 2021-05-26 RX ADMIN — CEFAZOLIN 2000 MG: 1 INJECTION, POWDER, FOR SOLUTION INTRAMUSCULAR; INTRAVENOUS at 17:17

## 2021-05-26 RX ADMIN — DEXAMETHASONE SODIUM PHOSPHATE 4 MG: 10 INJECTION, EMULSION INTRAMUSCULAR; INTRAVENOUS at 17:13

## 2021-05-26 RX ADMIN — Medication 60 MG: at 17:13

## 2021-05-26 RX ADMIN — MULTIPLE VITAMINS W/ MINERALS TAB 1 TABLET: TAB at 07:58

## 2021-05-26 RX ADMIN — CEFDINIR 300 MG: 300 CAPSULE ORAL at 10:59

## 2021-05-26 RX ADMIN — RAMIPRIL 10 MG: 10 CAPSULE ORAL at 07:58

## 2021-05-26 RX ADMIN — PHENYLEPHRINE HYDROCHLORIDE 200 MCG: 10 INJECTION INTRAVENOUS at 17:29

## 2021-05-26 RX ADMIN — CEFDINIR 300 MG: 300 CAPSULE ORAL at 20:50

## 2021-05-26 RX ADMIN — HEPARIN SODIUM 15 UNITS/KG/HR: 10000 INJECTION, SOLUTION INTRAVENOUS at 06:35

## 2021-05-26 RX ADMIN — ONDANSETRON HYDROCHLORIDE 4 MG: 4 INJECTION, SOLUTION INTRAMUSCULAR; INTRAVENOUS at 17:42

## 2021-05-26 RX ADMIN — TAMSULOSIN HYDROCHLORIDE 0.4 MG: 0.4 CAPSULE ORAL at 07:58

## 2021-05-26 RX ADMIN — FINASTERIDE 5 MG: 5 TABLET, FILM COATED ORAL at 07:58

## 2021-05-26 RX ADMIN — HEPARIN SODIUM 13 UNITS/KG/HR: 10000 INJECTION, SOLUTION INTRAVENOUS at 07:59

## 2021-05-26 RX ADMIN — PHENYLEPHRINE HYDROCHLORIDE 200 MCG: 10 INJECTION INTRAVENOUS at 17:26

## 2021-05-26 RX ADMIN — FLUTICASONE PROPIONATE 1 SPRAY: 50 SPRAY, METERED NASAL at 07:58

## 2021-05-26 RX ADMIN — ACETAMINOPHEN 650 MG: 325 TABLET ORAL at 08:03

## 2021-05-26 RX ADMIN — Medication 2000 UNITS: at 07:57

## 2021-05-26 ASSESSMENT — PULMONARY FUNCTION TESTS
PIF_VALUE: 17
PIF_VALUE: 0
PIF_VALUE: 35
PIF_VALUE: 22
PIF_VALUE: 21
PIF_VALUE: 18
PIF_VALUE: 23
PIF_VALUE: 19
PIF_VALUE: 1
PIF_VALUE: 31
PIF_VALUE: 14
PIF_VALUE: 18
PIF_VALUE: 18
PIF_VALUE: 26
PIF_VALUE: 18
PIF_VALUE: 11
PIF_VALUE: 19
PIF_VALUE: 31
PIF_VALUE: 21
PIF_VALUE: 18
PIF_VALUE: 24
PIF_VALUE: 21
PIF_VALUE: 0
PIF_VALUE: 0
PIF_VALUE: 19
PIF_VALUE: 0
PIF_VALUE: 21
PIF_VALUE: 3
PIF_VALUE: 18
PIF_VALUE: 21
PIF_VALUE: 21
PIF_VALUE: 19
PIF_VALUE: 19

## 2021-05-26 ASSESSMENT — PAIN SCALES - GENERAL
PAINLEVEL_OUTOF10: 0
PAINLEVEL_OUTOF10: 3
PAINLEVEL_OUTOF10: 0

## 2021-05-26 NOTE — PROGRESS NOTES
[facility]  INPATIENT OCCUP[facility] THERAP[facility]  STRZ RENAL TELEMETRY 6K  EVALUATION    Time:   Time In: 1017  Time Out: 1048  Timed Code Treatment Minutes: 23 Minutes  Minutes: 31      co-tx completed with PT d/t medical complexity    Date: 2021  Patient Name: Tena Cruz,   Gender: male      MRN: [id number]  : 1940  (68 y.o.)  Referring Practitioner: Martha Ribeiro DO  Diagnosis: Calculi, bladder, diverticulum  Additional Pertinent Hx: Per initial admissino H&P: \"72 y.o. male who presented to [address] [place] [address] with dark urine. Patient states that today he noticed his urine was \"brown\". He denies any clots of bright red blood. He notes that he has had some intermittent dysuria over the past couple of days. Patient has also had increased urinary urgency and frequency. He denies back pain or abdominal pain. Patient also complains of fatigue that has been going on for weeks gradually. In the past patient has had issues with hematuria while on his coumadin. He notes that he recently had an INR that was > 3 and they had bumped down his warfarin dose. Patient denies any history of GI bleed. In the ED patient initially had Atrial Fibrillation with RVR. Heart rate improved into 80s. Patient urine was found to be dark and UA was skewed; however, did have pyuria and hematuria. WBC elevated > 12. Lactic WNL. Patient has urine that is dark brown/black on exam. Patient appears in no acute distress. INR 1.8 on admission. \"    Restrictions/Precautions:  Restrictions/Precautions: Weight Bearing, Fall Risk  Position Activity Restriction  Other position/activity restrictions: Pt has a R club foot    Subjective  Patient assessed for rehabilitation services?: Yes  Family / Caregiver Present: No    Subjective: Highly pleasant and cooperative    Pain:  Pain Assessment  Patient Currently in Pain: Yes      Social/Functional History:  Lives With: Spouse  Type of Home: Trailer  Home Layout: One Decreased endurance, Decreased strength  Prognosis: Good  REQUIRES OT FOLLOW UP: Yes    Treatment Initiated: Treatment and education initiated within context of evaluation. Evaluation time included review of current medical information, gathering information related to past medical, social and functional history, completion of standardized testing, formal and informal observation of tasks, assessment of data and development of plan of care and goals. Treatment time included skilled education and facilitation of tasks to increase safety and independence with ADL's for improved functional independence and quality of life. Discharge Recommendations:  Patient would benefit from continued therapy after discharge    Patient Education:  OT Education: OT Role, Plan of Care, Precautions, ADL Adaptive Strategies, Transfer Training, Energy Conservation, Family Education    Equipment Recommendations: Other: defer to next level of care    Plan:  Times per week: 3-5x  Current Treatment Recommendations: Strengthening, Balance Training, Functional Mobility Training, Endurance Training, Self-Care / ADL, Patient/Caregiver Education & Training, Safety Education & Training, Equipment Evaluation, Education, & procurement. See long-term goal time frame for expected duration of plan of care. If no long-term goals established, a short length of stay is anticipated. Goals:     Short term goals  Time Frame for Short term goals: by discharge  Short term goal 1: Pt will complete sit pivot t/fs with SBA of 1 to increase indep with accessing environment for ADLs. Short term goal 2: Pt will tolerate dynamic sitting task with B hand release x 10 min and Elissa to increase balance required for sponge bathing. Short term goal 3: Pt will complete LB ADL with Lily to increase indep with self care. Following session, patient left in safe position with all fall risk precautions in place.

## 2021-05-26 NOTE — OP NOTE
Operative Note      Patient: Macrina Pelletier. YOB: 1940  MRN: [id number]    Date of Procedure: 5/26/2021    Pre-Op Diagnosis: BLADDER STONE, GROSS HEMATURIA. Post-Op Diagnosis: Same       Procedures:  1. Cystoscopy with clot evacuation and fulguration of bleeding bladder tissue. 2.  Cystoscopy with litholapaxy for stone less than 2.5 cm. Surgeon(s):  Katherleen Ormond, MD    Assistant:   * No surgical staff found *    Anesthesia: General    Estimated Blood Loss (mL): Minimal    Complications: None    Specimens:   * No specimens in log *    Implants:  * No implants in log *      Drains:   Urethral Catheter Triple-lumen (Active)   Catheter Indications Urinary retention (acute or chronic), continuous bladder irrigation or bladder outlet obstruction 05/26/21 1120   Site Assessment Pink 05/26/21 0415   Urine Color [facility] 05/26/21 1400   Urine Appearance Clots 05/26/21 1400   Output (mL) 300 mL 05/25/21 1150       Findings: Friable mucosa likely irritation from bladder stone. No papillary lesions noted. Clot within the urinary bladder. 2 cm bladder stone. Detailed Description of Procedure:   Patient is a 33-year-old male with gross hematuria. He comes in today for a cystoscopy clot evacuation fulguration and removal of bladder stone. After risks and benefits were explained he elected to proceed with today's procedure. After informed consent was obtained he is taken the operating room transferred after table spine position. Anesthesia was induced. Antibiotics were given. He is placed in modified dorsal lithotomy position and sterilely prepped and draped in a standard fashion. Timeout occurred in which 2 patient identifiers were used. At this time entered the urethra with the gyrus resection set. There was quite a bit of clot within the urinary bladder and I had to manually irrigated out.   I then surveyed the entire bladder and there was diffuse oozing on the floor the urinary bladder from irritation from the bladder stone. Bladder stone was quite spiculated and was causing a lot of mucosal irritation. I fulgurated the bleeding friable tissues until no further bleeding was noted. Both ureteral orifices were clear from the site of fulguration. At this time I switched out to a continuous flow scope with the working channel. I then treated the bladder stone with a 500 µm laser fiber. Once the stone was appropriately fragmented I irrigated all pieces out. Repeat cystoscopy demonstrated no bleeding no further stones. Pastor catheter was replaced. 10 cc was placed into the balloon. This completed the procedure. He is awakened and discharged back to hospital recovery in good and stable condition. Follow-up:  Would like to see him back in 6 weeks with a microscopic urinalysis.     Electronically signed by Patrick Malave MD on 5/26/2021 at 5:46 PM

## 2021-05-26 NOTE — PROGRESS NOTES
This RN hand irrigated the patient's catheter after the flow of CBI decreased and patient complained of discomfort in his penis area. 60ml of sterile water inserted and 60ml of blood tinged fluid returned with no issues.

## 2021-05-26 NOTE — CARE COORDINATION
5/26/21, 10:48 AM EDT    DISCHARGE PLANNING EVALUATION    HEIKE received call from CHAN with [address], they can accept pt. HEIKE spoke with Larry with [facility], they can also accept. Pt asked that SW come back this afternoon to speak with his spouse once she comes to visit. 1:10 PM update:  HEIKE met with both pt and spouse. Advised them that [facility] and [facility] can both accept. Pt and spouse would like [address] [place], [place] [address]nett [facility]. HEIKE updated CHAN with [email or web address]. HEIKE attempted to notify [facility] with [facility], no answer, unable to leave message.      1:24 PM update:  Received call back from Larry with [facility]- advised that pt is planning [email or web address]

## 2021-05-26 NOTE — PROGRESS NOTES
[address] [place] [address]  INPATIENT PHYSICAL THERAPY  DAILY NOTE  STRZ RENAL TELEMETRY 6K - 3T-87/768-D    Time In: 1010  Time Out: 1048  Timed Code Treatment Minutes: 45 Minutes  Minutes: 45      Co-treatment with OT     Date: 2021  Patient Name: Wendy Pennington.,  Gender:  male        MRN: [id number]  : 1940  (68 y.o.)     Referring Practitioner: Rodo Jalloh DO  Diagnosis: Calculi, bladder, diverticulum  Additional Pertinent Hx: Per initial admissino H&P: \"72 y.o. male who presented to [address] [place] [address] with dark urine. Patient states that today he noticed his urine was \"brown\". He denies any clots of bright red blood. He notes that he has had some intermittent dysuria over the past couple of days. Patient has also had increased urinary urgency and frequency. He denies back pain or abdominal pain. Patient also complains of fatigue that has been going on for weeks gradually. In the past patient has had issues with hematuria while on his coumadin. He notes that he recently had an INR that was > 3 and they had bumped down his warfarin dose. Patient denies any history of GI bleed. In the ED patient initially had Atrial Fibrillation with RVR. Heart rate improved into 80s. Patient urine was found to be dark and UA was skewed; however, did have pyuria and hematuria. WBC elevated > 12. Lactic WNL. Patient has urine that is dark brown/black on exam. Patient appears in no acute distress. INR 1.8 on admission. \"     Prior Level of Function:  Lives With: Spouse  Type of Home: Trailer  Home Layout: One level  Home Access: Ramped entrance  Home Equipment:  (Pt states that he primarily uses the W/C at home)        ADL Assistance: Needs assistance  Ambulation Assistance: Needs assistance  Transfer Assistance: Needs assistance  Active : No    Restrictions/Precautions:  Restrictions/Precautions: Weight Bearing, Fall Risk  Position Activity Restriction  Other position/activity restrictions: Pt has a R club foot     SUBJECTIVE: Pt was laying comfortably upon entry. He was motivated to complete therapy. Pt was going into surgery later in the afternoon. PAIN: Not reported    Vitals: Vitals not assessed per clinical judgement, see nursing flowsheet    OBJECTIVE:  Bed Mobility:  Rolling to Left: Minimal Assistance, X 1, with increased time for completion   Rolling to Right: Contact Guard Assistance   Supine to Sit: Moderate Assistance, X 2, with verbal cues , with increased time for completion  Sit to Supine: Minimal Assistance, X 2, with verbal cues. Scooting: Moderate Assistance, X 2, with increased time for completion    Transfers:  Sit Pivot:Minimal Assistance, Moderate Assistance, X 2, with increased time for completion, with verbal cues    Ambulation:  Not Tested      Balance:  Static Sitting Balance:  Stand By Assistance  Dynamic Sitting Balance: Stand By Assistance    Exercise:  Patient was guided in 1 set(s) 10 reps of exercise to both lower extremities. Ankle pumps, Seated marches, Seated heel/toe raises and Long arc quads. Exercises were completed for increased independence with functional mobility. Functional Outcome Measures: Completed  AM-PAC Inpatient Mobility Raw Score : 9  AM-PAC Inpatient T-Scale Score : 30.55    ASSESSMENT:  Assessment: Patient progressing toward established goals. Activity Tolerance:  Patient tolerance of  treatment: good.        Equipment Recommendations:Equipment Needed: No  Discharge Recommendations:   Continue to assess pending progress, Subacute/Skilled Nursing Facility    Plan: Times per week: 3-5x GM  Current Treatment Recommendations: Strengthening, Functional Mobility Training, ROM, Transfer Training, Balance Training, Endurance Training, Positioning    Patient Education  Patient Education: Plan of Care, Home Exercise Program, Transfers    Goals:  Patient goals : go home  Short term goals  Short term goal 1: Pt will supine<>sit with Min assist x1 in order to

## 2021-05-26 NOTE — ANESTHESIA PRE PROCEDURE
Department of Anesthesiology  Preprocedure Note       Name:  Miguel Angel West. Age:  68 y.o.  :  1940                                          MRN:  [id number]         Date:  2021      Surgeon: Mason Rodriguez):  Yeison Hagan MD    Procedure: Procedure(s):  CYSTOSCOPY,  EVACUATION OF CLOTS, FULGURATION OF BLEEDERS, BLADDER STONE REMOVL    Medications prior to admission:   Prior to Admission medications    Medication Sig Start Date End Date Taking? Authorizing Provider   PARoxetine (PAXIL) 40 MG tablet Take 1 tablet by mouth every morning New dose. 21  Yes Karthik Salgado MD   SITagliptin (JANUVIA) 100 MG tablet Take 1 tablet by mouth daily Discontinue script for Tradjenta 3/25/21  Yes Karthik Salgado MD   bumetanide (BUMEX) 1 MG tablet TAKE 1 TABLET DAILY AS NEEDED (FLUID OVERLOAD, WEIGHT GAIN, SHORTNESS OF BREATH) 3/10/21  Yes Karthik Salgado MD   acetaminophen (TYLENOL) 500 MG tablet Take 500 mg by mouth every 6 hours as needed for Pain or Fever Don't take more than 3,000 mg each day, including what's in Tylenol P.M. Yes Historical Provider, MD   diphenhydrAMINE-APAP, sleep, (TYLENOL PM EXTRA STRENGTH)  MG tablet Take 2 tablets by mouth nightly as needed for Sleep   Yes Historical Provider, MD   dutasteride (AVODART) 0.5 MG capsule TAKE 1 CAPSULE DAILY 1/3/21  Yes Karhtik Salgado MD   ezetimibe-simvastatin (VYTORIN) 10-40 MG per tablet TAKE 1 TABLET NIGHTLY 1/3/21 7/2/21 Yes Karthik Salgado MD   ramipril (ALTACE) 10 MG capsule TAKE 1 CAPSULE DAILY 12/15/20  Yes Ryan Polo MD   metoprolol succinate (TOPROL XL) 100 MG extended release tablet TAKE 1 TABLET DAILY 12/15/20  Yes Ryan Polo MD   metFORMIN (GLUCOPHAGE-XR) 500 MG extended release tablet TAKE 2 TABLETS DAILY WITH BREAKFAST  Patient taking differently: TAKE 1 TABLETS DAILY WITH BREAKFAST 12/15/20  Yes Ryan Polo MD   warfarin (COUMADIN) 5 MG tablet Take as directed by [address] [place] [address] [facility]. (135 tabs = 90 day supply) 6/2/20  Yes Lisset Santana MD   Mis Natural Products (OSTEO BI-FLEX TRIPLE STRENGTH) TABS Take 2 tablets by mouth daily Indications: Knee Pain   Yes Historical Provider, MD   Multiple Vitamins-Minerals (PRESERVISION AREDS 2 PO) Take by mouth daily   Yes Historical Provider, MD   fluticasone (FLONASE) 50 MCG/ACT nasal spray USE 2 SPRAYS NASALLY DAILY  Patient taking differently: as needed  12/12/18  Yes Hilton Saint, MD   aspirin EC 81 MG EC tablet Take 81 mg by mouth daily. Blood thinner; with food. Yes Historical Provider, MD   Cholecalciferol (VITAMIN D-3) 5000 UNIT TABS Take  by mouth daily.  Indications: Treatment with Vitamin D   Yes Historical Provider, MD   nitrofurantoin (MACRODANTIN) 100 MG capsule Take 1 capsule by mouth 4 times daily for 5 days 5/22/21 5/27/21  LILIANA Alfonso CNP   dutasteride (AVODART) 0.5 MG capsule TAKE 1 CAPSULE DAILY 10/8/19   Hilton Saint, MD   ezetimibe-simvastatin (VYTORIN) 10-40 MG per tablet TAKE 1 TABLET NIGHTLY 10/7/19   LILIANA Alfonso CNP   OLIVE LEAF EXTRACT PO Take 4 tablets by mouth daily as needed supplement    Historical Provider, MD       Current medications:    Current Facility-Administered Medications   Medication Dose Route Frequency Provider Last Rate Last Admin    lidocaine (XYLOCAINE) 2 % uro-jet   Topical PRN Kit LILIANA Ball CNP        cefdinir (OMNICEF) capsule 300 mg  300 mg Oral 2 times per day Brenda Wallace MD   300 mg at 05/26/21 1059    fluticasone (FLONASE) 50 MCG/ACT nasal spray 1 spray  1 spray Each Nostril Daily Brenda Wallace MD   1 spray at 05/26/21 0758    0.9 % sodium chloride infusion   Intravenous Continuous Fernanda Hutchinson MD 50 mL/hr at 05/24/21 0908 Rate Change at 05/24/21 0908    sodium chloride flush 0.9 % injection 5-40 mL  5-40 mL Intravenous 2 times per day Dulce Arce DO        sodium chloride flush 0.9 % injection 5-40 mL  5-40 mL Intravenous PRN Dulce Arce DO        0.9 % sodium chloride infusion  25 mL Intravenous PRN Fred [place] Kosa, DO        acetaminophen (TYLENOL) tablet 650 mg  650 mg Oral Q4H PRN Fred [place] Kosa, DO   650 mg at 05/26/21 0803    tamsulosin (FLOMAX) capsule 0.4 mg  0.4 mg Oral Daily Fred [place] Kosa, DO   0.4 mg at 05/26/21 8932    [Held by provider] metFORMIN (GLUCOPHAGE-XR) extended release tablet 1,000 mg  1,000 mg Oral Daily with breakfast Shannon Cárdenas, DO        metoprolol succinate (TOPROL XL) extended release tablet 100 mg  100 mg Oral Daily Fred [place] Kosa, DO   100 mg at 05/26/21 0757    PARoxetine (PAXIL) tablet 40 mg  40 mg Oral QAM Fred [place] Kosa, DO   40 mg at 05/26/21 0758    ramipril (ALTACE) capsule 10 mg  10 mg Oral Daily Fred [place] Kosa, DO   10 mg at 05/26/21 0758    [Held by provider] alogliptin (NESINA) tablet 25 mg  25 mg Oral Daily Fred [place] Kosa, DO        [Held by provider] aspirin EC tablet 81 mg  81 mg Oral Daily Fred [place] Kosa, DO        Vitamin D (CHOLECALCIFEROL) tablet 2,000 Units  2,000 Units Oral Daily Shannon Cárdenas, DO   2,000 Units at 05/26/21 0757    finasteride (PROSCAR) tablet 5 mg  5 mg Oral Daily Fred [place] Kosa, DO   5 mg at 05/26/21 0758    glucose (GLUTOSE) 40 % oral gel 15 g  15 g Oral PRN Fred [place] Kosa, DO        dextrose 50 % IV solution  12.5 g Intravenous PRN Fred [place] Kosa, DO        glucagon (rDNA) injection 1 mg  1 mg Intramuscular PRN Fred [place] Kosa, DO        dextrose 5 % solution  100 mL/hr Intravenous PRN Fred [place] Kosa, DO        insulin lispro (HUMALOG) injection vial 0-12 Units  0-12 Units Subcutaneous TID WC Shannon Cárdenas, DO   4 Units at 05/26/21 0759    insulin lispro (HUMALOG) injection vial 0-6 Units  0-6 Units Subcutaneous Nightly Fred [place] Kosa, DO   1 Units at 05/25/21 2043    miconazole (MICOTIN) 2 % powder   Topical BID Shannon Cárdenas DO   Given at 05/26/21 1059    therapeutic multivitamin-minerals 1 tablet  1 tablet Oral Daily Shannon Cárdenas DO   1 tablet at 05/26/21 0758    ezetimibe-simvastatin (VYTORIN) 10-40 MG per tablet 1 tablet  1 tablet Oral Nightly Lawandakaren Summers, DO   1 tablet at 05/25/21 2116    heparin (porcine) injection 10,000 Units  10,000 Units Intravenous PRN Sohail Whitehead MD        heparin (porcine) injection 5,000 Units  5,000 Units Intravenous PRN Sohail Whitehead MD   5,000 Units at 05/24/21 1936    heparin 25,000 units in dextrose 5% 250 mL (premix) infusion  5-30 Units/kg/hr Intravenous Continuous Venia MD Ventura 17.5 mL/hr at 05/26/21 1400 13 Units/kg/hr at 05/26/21 1400       Allergies:     Allergies   Allergen Reactions    Lipitor Other (See Comments)     myalgia    Pcn [Penicillins] Rash       Problem List:    Patient Active Problem List   Diagnosis Code    Hyperlipidemia E78.5    Hypertension I10    Depression F32.9    Lower extremity edema R60.0    Morbid obesity ([address] E66.01    Type 2 diabetes mellitus without complication, without long-term current use of insulin (HCC) E11.9    Pulmonary embolism (HCC) I26.99    Persistent atrial fibrillation (HCC) I48.19    Anticoagulated on Coumadin Z79.01    Benign non-nodular prostatic hyperplasia without lower urinary tract symptoms N40.0    Recurrent major depressive disorder, in partial remission ([address] F33.41    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    Encounter for therapeutic drug monitoring Z51.81    Calculi, bladder, diverticulum N21.0    Acute cystitis with hematuria N30.01       Past Medical History:        Diagnosis Date    Allergic rhinitis     Atrial fibrillation ([address] 2/2014    Depression     Diabetes mellitus type II 1/2012    diagnosed with HgA1c 6.6    Erectile dysfunction     Hyperlipidemia     Hypertension     Lower extremity edema     LV dysfunction     h/o, normal now    Morbid obesity ([address]     Osteoarthritis     left knee, right foot, back    PE (pulmonary embolism) 2/2014       Past Surgical History: Value Date    PROTIME 35.8 12/18/2017    INR 1.19 05/26/2021    APTT 72.5 05/26/2021       HCG (If Applicable): No results found for: PREGTESTUR, PREGSERUM, HCG, HCGQUANT     ABGs: No results found for: PHART, PO2ART, GSO3XXT, JAD4KEL, BEART, U9LJWBWB     Type & Screen (If Applicable):  No results found for: LABABO, LABRH    Drug/Infectious Status (If Applicable):  No results found for: HIV, HEPCAB    COVID-19 Screening (If Applicable):   Lab Results   Component Value Date    COVID19 NOT DETECTED 05/26/2021           Anesthesia Evaluation  Patient summary reviewed and Nursing notes reviewed no history of anesthetic complications:   Airway: Mallampati: III        Dental:          Pulmonary:   (+) decreased breath sounds,                             Cardiovascular:  Exercise tolerance: poor (<4 METS),   (+) hypertension:, dysrhythmias: atrial fibrillation,         Rhythm: irregular  Rate: normal                    Neuro/Psych:               GI/Hepatic/Renal:        (-) GERD       Endo/Other:    (+) Diabetes, . Abdominal:   (+) obese,     Abdomen: soft. Vascular:                                        Anesthesia Plan      general     ASA 3       Induction: intravenous. MIPS: Postoperative opioids intended and Prophylactic antiemetics administered. Anesthetic plan and risks discussed with patient. Plan discussed with CRNA.                   Mary Fajardo DO   5/26/2021

## 2021-05-26 NOTE — FLOWSHEET NOTE
05/26/21 1834   Provider Notification   Reason for Communication Review case   Provider Name Anika Seen   Provider Notification Advance Practice Clinician (CNS, NP, CNM, CRNA, PA)   Method of Communication Secure Message   Response See orders       Lopez Delaney 1G80- patient had cystoscopy today with evacuation of clots, etc.. hospitalist wants to clarify you are okay with restarting heparin drip after procedure? ? Does it need to be off for a couple of hours after? Thank you    Josue Le NP stated:   [id number] to restart Urine should be clear     RN stated:  urine isn't clear as of now- still pretty bloody looking. Still good with drip being started? ? Thank you    Josue Le NP stated to \"hand irrigate as needed and start CBI if needed. Let's hold heparin until AM if CBI needs restarted and clamp at 0500. \"    This RN and [employer] restarted CBI at bedside and educated patient. Orders placed per this RN.

## 2021-05-26 NOTE — DISCHARGE INSTR - COC
No       Date of Last BM: t      Intake/Output Summary (Last 24 hours) at 5/26/2021 1308  Last data filed at 5/26/2021 1135  Gross per 24 hour   Intake 5294 ml   Output -950 ml   Net 6244 ml     I/O last 3 completed shifts: In: 2392 [P.O.:800; I.V.:4254]  Out: 1050 [Urine:1050]    Safety Concerns: At Risk for Falls    Impairments/Disabilities:      None    Nutrition Therapy:  Current Nutrition Therapy:   - Oral Diet:  Carb Control 5 carbs/meal (2000kcals/day)    Routes of Feeding: Oral  Liquids: No Restrictions  Daily Fluid Restriction: no  Last Modified Barium Swallow with Video (Video Swallowing Test): not done    Treatments at the Time of Hospital Discharge:   Respiratory Treatments: none  Oxygen Therapy:  is not on home oxygen therapy.   Ventilator:    - No ventilator support    Rehab Therapies: Physical Therapy  Weight Bearing Status/Restrictions: No weight bearing restirctions  Other Medical Equipment (for information only, NOT a DME order):  walker  Other Treatmentsnone    Patient's personal belongings (please select all that are sent with patient):  Dentures upper    RN SIGNATURE:  {Esignature:[id number]}    CASE MANAGEMENT/SOCIAL WORK SECTION    Inpatient Status Date: 5/22/21    Readmission Risk Assessment Score:  Readmission Risk              Risk of Unplanned Readmission:  15           Discharging to Facility/ Agency   · Name: [facility]  · Address: [address] [place] [address]  · Phone:[phone]  · Fax:[phone]    Dialysis Facility (if applicable)   · Name:  · Address:  · Dialysis Schedule:  · Phone:  · Fax:    / signature: Electronically signed by WILLY Cota on 5/26/21 at 1:10 PM EDT    PHYSICIAN SECTION    Prognosis: {Prognosis:7318737029}    Condition at Discharge: Stable    Rehab Potential (if transferring to Rehab): {Prognosis:3751405255}    Recommended Labs or Other Treatments After Discharge: ***    Physician Certification: I certify the above information and transfer of Sabino Boateng.  is necessary for the continuing treatment of the diagnosis listed and that he requires {Admit to Appropriate Level of Care:62045} for {GREATER/LESS:848881779} 30 days.      Update Admission H&P: {CHP DME Changes in VXV:707863587}    PHYSICIAN SIGNATURE:  {Esignature:[id number]}

## 2021-05-26 NOTE — PROGRESS NOTES
Hospitalist Progress Note    Patient:  Nataliia Nowak. Unit/Bed:6K-27/027-A    YOB: 1940    MRN: [id number]       Acct: [de-identified]     PCP: Hilton Saint, MD    Date of Admission: 5/22/2021    Assessment/Plan:    Anticipated Discharge in :     [facility] [address] Problems    Diagnosis Date Noted    Acute cystitis with hematuria [N30.01]     Calculi, bladder, diverticulum [N21.0] 05/22/2021     Gross Hematuria secondary to nephrolithiasis/cystolithiasis, improving  CT abd/plv showed nonobstructing calculi both kidneys and in the urinary bladder.  Acute cystitis.  7 cm and 2.4 cm urinary bladder diverticuli.  Large mass in the dorsal subcutaneous tissues of the lower right flank and hemipelvis most suggestive of a hematoma which measures approximately 10 x 12 x 19 cm.    Urology consulted  CBI started, planning cystoscopy, clot evacuation, fulguration and removal of bladder stone today    Nephrolithiasis/Cystolithiasis  See treatment above    Acute Complicated UTI  Patient is male, with DM, nephrolithiasis, cystolithiasis and bladder diverticulum with hx of urinary retention  Growth of Granulicatella adiacens   Started on ceftriaxone, now on cefdinir, finish 10-14 day course of treatment    Urinary Retention  Currently has reilly on CBI  Continue flomax  Urology following    Right Flank hematoma  Seen on CT abd/plv  Coumadin on hold, continue heparin  Hgb stable at 9.1, continue to monitor  Conservative management for now   Serial imaging if needed    Atrial Fibrillation, currently rate controlled  Coumadin on hold, continue heparin  Continue toprol  Telemetry monitoring    Essential HTN  Continue Toprol and ramipril    DM type II  HBA1C 7.6 on 3/21  Continue humalog SS  Hypoglycemia protocol    Morbid Obesity  BMI  43.78  Diet and lifestyle modification    Physical Deconditioning due to illness  PT/OT ordered    Disposition  ECF placement on discharge    Chief Complaint: hematuria    Hospital Course:   Per admission H&P:    \"72 y. o. male who presented to [facility] with dark urine. Patient states that today he noticed his urine was \"brown\". He denies any clots of bright red blood. He notes that he has had some intermittent dysuria over the past couple of days. Patient has also had increased urinary urgency and frequency. He denies back pain or abdominal pain. Patient also complains of fatigue that has been going on for weeks gradually. In the past patient has had issues with hematuria while on his coumadin. He notes that he recently had an INR that was > 3 and they had bumped down his warfarin dose. Patient denies any history of GI bleed.     In the ED patient initially had Atrial Fibrillation with RVR. Heart rate improved into 80s. Patient urine was found to be dark and UA was skewed; however, did have pyuria and hematuria. WBC elevated > 12. Lactic WNL. Patient has urine that is dark brown/black on exam. Patient appears in no acute distress. INR 1.8 on admission. \"      5/25: continue to hold coumadin as urine is still bloody. Urology inserted 3-way reilly catheter and began CBI. Urine culture grew granulicatella adiacens - antibiotic switched to Cefdinir. Subjective:   Patient seen and examined, appears comfortable in bed, without any signs of cardiorespiratory distress. VS stable, afebrile, saturating well on room air. Hgb stable. CBI running, blood tinged in bag. No new complaints.       Medications:  Reviewed    Infusion Medications    sodium chloride 50 mL/hr at 05/24/21 0908    sodium chloride      dextrose      heparin (PORCINE) Infusion 13 Units/kg/hr (05/26/21 1400)     Scheduled Medications    cefdinir  300 mg Oral 2 times per day    fluticasone  1 spray Each Nostril Daily    sodium chloride flush  5-40 mL Intravenous 2 times per day    tamsulosin  0.4 mg Oral Daily    [Held by provider] metFORMIN  1,000 mg Oral Daily with breakfast    insight  Capillary Refill: Brisk,< 3 seconds   Peripheral Pulses: +2 palpable, equal bilaterally       Labs:   Recent Labs     05/24/21  0534 05/25/21  0625 05/25/21  1844 05/26/21  0610   WBC 10.0 8.3  --  10.2   HGB 9.5* 9.7* 9.0* 9.1*   HCT 32.2* 32.7* 31.6* 31.4*    351  --  332     Recent Labs     05/24/21  0534 05/25/21  0625 05/26/21  0610    142 140   K 3.9 4.7 4.1    108 107   CO2 25 26 24   BUN 18 14 10   CREATININE 0.7 0.6 0.7   CALCIUM 8.1* 8.6 8.7     No results for input(s): AST, ALT, BILIDIR, BILITOT, ALKPHOS in the last 72 hours. Recent Labs     05/24/21  1245 05/25/21  0625 05/26/21  0610   INR 1.49* 1.29* 1.19*     No results for input(s): CKTOTAL, TROPONINI in the last 72 hours. Urinalysis:      Lab Results   Component Value Date    NITRU see below 05/22/2021    WBCUA 25-50 05/22/2021    WBCUA 6-10 05/30/2015    BACTERIA MANY 05/22/2021    RBCUA 50-75 05/22/2021    BLOODU see below 05/22/2021    SPECGRAV see below 05/22/2021    GLUCOSEU Negative 05/30/2015       Radiology:  CT ABDOMEN PELVIS WO CONTRAST Additional Contrast? None   Final Result   Impression:   1. Motion affected study. 2.  Nonobstructing calculi both kidneys and in the urinary bladder. Acute    cystitis. 7 cm and 2.4 cm urinary bladder diverticuli. 3.  Large mass in the dorsal subcutaneous tissues of the lower right flank    and hemipelvis most suggestive of a hematoma which measures approximately    10 x 12 x 19 cm. Recommend clinical correlation. 4.  No bowel obstruction or ascites. Colonic diverticulosis without acute    diverticulitis. Probable proctitis. This document has been electronically signed by: Deo Echveerria. DO Drew on    05/22/2021 10:48 PM      All CTs at this facility use dose modulation techniques and iterative    reconstructions, and/or weight-based dosing   when appropriate to reduce radiation to a low as reasonably achievable.           Diet: DIET CARB CONTROL;    DVT prophylaxis: [] Lovenox                                 [] SCDs                                 [] SQ Heparin                                 [] Encourage ambulation           [] Already on Anticoagulation     Disposition:    [] Home       [] TCU       [] Rehab       [] Psych       [] SNF       [] [place]       [] Other-    Code Status: Full Code    PT/OT Eval Status:         Electronically signed by Esteban Burgess MD on 5/26/2021 at 4:03 PM

## 2021-05-26 NOTE — CARE COORDINATION
5/26/21, 11:23 AM EDT    DISCHARGE ON GOING EVALUATION    Sky Estrada. Hospital day: 4  Location: -27/027-A Reason for admit: Calculi, bladder, diverticulum [N21.0]   Procedure:   5/26 Cystoscopy, clot evacuation and fulguration of bleeder and bladder stone removal by Dr. Payton Ruiz pending  Barriers to Discharge: Urology following, IV fluids, oral Omnicef, Procar, diabetes management, Heparin drip, PT/OT. PCP: Vipul Tamez MD  Readmission Risk Score: 15%  Patient Goals/Plan/Treatment Preferences: from home with spouse. Referral made to [facility] [place] [address] and [address] [place] [address]. SW following. Refer to HEIKE note.

## 2021-05-26 NOTE — PROGRESS NOTES
1754 awake and talking on arrival to PACU , denies any pain or nausea , O2 3l NC applied  1810 resting resp easy   1825 resting resp easy  , O2 off   1840 pt continues to deny pain   1845 meets criteria for discharge , transported to floor

## 2021-05-26 NOTE — PROGRESS NOTES
Urology Progress Note    Reason for Consult: Gross Hematuria, Nephrolithiasis (Non-obstructing), Complicated UTI, Bladder Diverticulum  Subjective: \"    Patient is resting in bed, CBI draining dark red urine, +flatus, +BM, ambulating with assistance, tolerating regular diet, denies any nausea or vomiting. There are complaints of no pain at this time.     Some increase in right lower flank pain - HGB stable, Urology recommends continuing conservative treatment  CBI draining dark, red irrigant, no change in color from yesterday    Planning on Cystoscopy, clot evacuation and fulguration of bleeders and bladder stone removal by Dr Renetta Charles today       Vitals:  /80   Pulse 107   Temp 97.6 °F (36.4 °C) (Oral)   Resp 18   Ht 5' 8\" (1.727 m) Comment: per patient  Wt 287 lb 14.4 oz (130.6 kg)   SpO2 94%   BMI 43.78 kg/m²   Temp  Av.2 °F (36.8 °C)  Min: 97.6 °F (36.4 °C)  Max: 98.8 °F (37.1 °C)    Intake/Output Summary (Last 24 hours) at 2021 6003  Last data filed at 2021 0450  Gross per 24 hour   Intake 5054 ml   Output 1050 ml   Net 4004 ml       Social History     Socioeconomic History    Marital status:      Spouse name: Mando Gutiérrez Number of children: 7    Years of education: 12    Highest education level: High school graduate   Occupational History    Not on file   Tobacco Use    Smoking status: Former Smoker     Packs/day: 1.50     Years: 15.00     Pack years: 22.50     Types: Cigarettes     Quit date: 1984     Years since quittin.5    Smokeless tobacco: Former User     Types: Snuff, Chew   Substance and Sexual Activity    Alcohol use: No    Drug use: No    Sexual activity: Not on file   Other Topics Concern    Not on file   Social History Narrative    Not on file     Social Determinants of Health     Financial Resource Strain: Low Risk     Difficulty of Paying Living Expenses: Not hard at all   Food Insecurity: No Food Insecurity    Worried About Running Out of Extremities:    No cyanosis, clubbing, or edema present. Neurological:    Alert and oriented. Labs:  WBC:    Lab Results   Component Value Date    WBC 10.2 05/26/2021     Hemoglobin/Hematocrit:    Lab Results   Component Value Date    HGB 9.1 05/26/2021    HCT 31.4 05/26/2021     BMP:    Lab Results   Component Value Date     05/26/2021    K 4.1 05/26/2021     05/26/2021    CO2 24 05/26/2021    BUN 10 05/26/2021    LABALBU 2.9 05/23/2021    CREATININE 0.7 05/26/2021    CALCIUM 8.7 05/26/2021    LABGLOM >90 05/26/2021           Assessment and Plan  Continue CBI  NPO  Consent  Covid - if not vaccinated    Cystoscopy, clot evacuation and fulguration of bleeders and bladder stone removal by Dr Jordon Shahid today     I described the procedure in detail and also described the associated risks and benefits at length. We discussed possible alternative therapies. We discussed the risks and benefits of not undergoing therapy. Patient understands these risks and benefits and desires to proceed. Continue to hold Coumadin, Heparin ok at this time. 1. Nephrolithiasis- Per CT abdomen and pelvis. 8 mm stone on the right and 2 mm stone on the left. Non-obstructing. Patient is asymptomatic and does not want to consider treatment at this time.     2. Bladder stone- Per CT scan, there is a 2 cm bladder stone. May be contributing to his urinary retention - cysto today     3. Urinary Retention - cysto today     4. Bladder Diverticulum x 2     5. Bladder Wall Thickening     6. Pelvic/Lower Flank Hematoma- Recommend conservative management only at this time, unless strongly suspect abscess. . Holding Coumadin. Hgb at 9.1. Admission Hgb 11.1. Hgb on 5/5/21 was 13.2. Continue serial H/H.  Closely monitor.     7. Acute Cystitis-   Organism Abnormal  05/22/2021  7:25 PM [address]    Urine Culture, Routine 05/22/2021  7:25  [address] [facility]   [place] count: >100,000 CFU/mL Granulicatella adaciens has shown susceptibility to Penicillin, Cefazolin, Minocycline and Vancomycin. It is typically resistant to Ciprofloxacin and Trimethoprim/sulfamethoxazole. Medicine stopped Rocephin and started Omnicef    8. Persistent Afib- Coumadin on hold. On Heparin.     Katy Pacheco, LILIANA - CNP, APRN  05/26/21 9:52 AM  Urology

## 2021-05-26 NOTE — ANESTHESIA POSTPROCEDURE EVALUATION
Department of Anesthesiology  Postprocedure Note    Patient: Sky Horan MRN: [id number]  YOB: 1940  Date of evaluation: 5/26/2021  Time:  6:46 PM     Procedure Summary     Date: 05/26/21 Room / Location: [address] [facility] [place]    Anesthesia Start: 1706 Anesthesia Stop: 3360    Procedure: CYSTOSCOPY,  EVACUATION OF CLOTS, FULGURATION OF BLEEDERS, BLADDER STONE REMOVAL (N/A ) Diagnosis: (BLADDER STONE, CLOTS)    Surgeons: Prabhjot Solano MD Responsible Provider: Brett Castro DO    Anesthesia Type: general ASA Status: 3          Anesthesia Type: general    Garland Phase I: Garland Score: 9    Garland Phase II:      Last vitals: Reviewed and per EMR flowsheets.        Anesthesia Post Evaluation    Patient location during evaluation: PACU  Patient participation: complete - patient participated  Level of consciousness: awake  Airway patency: patent  Nausea & Vomiting: no nausea and no vomiting  Complications: no  Cardiovascular status: hemodynamically stable  Respiratory status: acceptable  Hydration status: stable

## 2021-05-27 LAB
ANION GAP SERPL CALCULATED.3IONS-SCNC: 9 MEQ/L (ref 8–16)
APTT: 23.2 SECONDS (ref 22–38)
APTT: 23.9 SECONDS (ref 22–38)
APTT: 25.4 SECONDS (ref 22–38)
BUN BLDV-MCNC: 9 MG/DL (ref 7–22)
CALCIUM SERPL-MCNC: 8.4 MG/DL (ref 8.5–10.5)
CHLORIDE BLD-SCNC: 107 MEQ/L (ref 98–111)
CO2: 25 MEQ/L (ref 23–33)
CREAT SERPL-MCNC: 0.7 MG/DL (ref 0.4–1.2)
ERYTHROCYTE [DISTWIDTH] IN BLOOD BY AUTOMATED COUNT: 14.5 % (ref 11.5–14.5)
ERYTHROCYTE [DISTWIDTH] IN BLOOD BY AUTOMATED COUNT: 57 FL (ref 35–45)
GFR SERPL CREATININE-BSD FRML MDRD: > 90 ML/MIN/1.73M2
GLUCOSE BLD-MCNC: 210 MG/DL (ref 70–108)
GLUCOSE BLD-MCNC: 249 MG/DL (ref 70–108)
GLUCOSE BLD-MCNC: 269 MG/DL (ref 70–108)
GLUCOSE BLD-MCNC: 287 MG/DL (ref 70–108)
GLUCOSE BLD-MCNC: 299 MG/DL (ref 70–108)
HCT VFR BLD CALC: 28.9 % (ref 42–52)
HCT VFR BLD CALC: 29.8 % (ref 42–52)
HEMOGLOBIN: 8.4 GM/DL (ref 14–18)
HEMOGLOBIN: 8.6 GM/DL (ref 14–18)
INR BLD: 1.13 (ref 0.85–1.13)
INR BLD: 1.18 (ref 0.85–1.13)
MCH RBC QN AUTO: 31.7 PG (ref 26–33)
MCHC RBC AUTO-ENTMCNC: 28.9 GM/DL (ref 32.2–35.5)
MCV RBC AUTO: 110 FL (ref 80–94)
PLATELET # BLD: 346 THOU/MM3 (ref 130–400)
PMV BLD AUTO: 8.8 FL (ref 9.4–12.4)
POTASSIUM SERPL-SCNC: 4.8 MEQ/L (ref 3.5–5.2)
RBC # BLD: 2.71 MILL/MM3 (ref 4.7–6.1)
SODIUM BLD-SCNC: 141 MEQ/L (ref 135–145)
WBC # BLD: 11.1 THOU/MM3 (ref 4.8–10.8)

## 2021-05-27 PROCEDURE — 51700 IRRIGATION OF BLADDER: CPT

## 2021-05-27 PROCEDURE — 6370000000 HC RX 637 (ALT 250 FOR IP): Performed by: STUDENT IN AN ORGANIZED HEALTH CARE EDUCATION/TRAINING PROGRAM

## 2021-05-27 PROCEDURE — 99232 SBSQ HOSP IP/OBS MODERATE 35: CPT | Performed by: FAMILY MEDICINE

## 2021-05-27 PROCEDURE — 2580000003 HC RX 258: Performed by: UROLOGY

## 2021-05-27 PROCEDURE — 85018 HEMOGLOBIN: CPT

## 2021-05-27 PROCEDURE — 36415 COLL VENOUS BLD VENIPUNCTURE: CPT

## 2021-05-27 PROCEDURE — 99232 SBSQ HOSP IP/OBS MODERATE 35: CPT | Performed by: UROLOGY

## 2021-05-27 PROCEDURE — 82948 REAGENT STRIP/BLOOD GLUCOSE: CPT

## 2021-05-27 PROCEDURE — 85610 PROTHROMBIN TIME: CPT

## 2021-05-27 PROCEDURE — 85014 HEMATOCRIT: CPT

## 2021-05-27 PROCEDURE — 80048 BASIC METABOLIC PNL TOTAL CA: CPT

## 2021-05-27 PROCEDURE — 1200000000 HC SEMI PRIVATE

## 2021-05-27 PROCEDURE — 85027 COMPLETE CBC AUTOMATED: CPT

## 2021-05-27 PROCEDURE — 6370000000 HC RX 637 (ALT 250 FOR IP): Performed by: UROLOGY

## 2021-05-27 PROCEDURE — 85730 THROMBOPLASTIN TIME PARTIAL: CPT

## 2021-05-27 RX ORDER — WARFARIN SODIUM 10 MG/1
10 TABLET ORAL
Status: COMPLETED | OUTPATIENT
Start: 2021-05-27 | End: 2021-05-27

## 2021-05-27 RX ORDER — WARFARIN SODIUM 5 MG/1
5 TABLET ORAL DAILY
Status: DISCONTINUED | OUTPATIENT
Start: 2021-05-27 | End: 2021-05-27

## 2021-05-27 RX ADMIN — PAROXETINE HYDROCHLORIDE 40 MG: 20 TABLET, FILM COATED ORAL at 08:56

## 2021-05-27 RX ADMIN — INSULIN LISPRO 6 UNITS: 100 INJECTION, SOLUTION INTRAVENOUS; SUBCUTANEOUS at 13:28

## 2021-05-27 RX ADMIN — TAMSULOSIN HYDROCHLORIDE 0.4 MG: 0.4 CAPSULE ORAL at 08:56

## 2021-05-27 RX ADMIN — WARFARIN SODIUM 10 MG: 10 TABLET ORAL at 17:23

## 2021-05-27 RX ADMIN — RAMIPRIL 10 MG: 10 CAPSULE ORAL at 08:56

## 2021-05-27 RX ADMIN — EZETIMIBE AND SIMVASTATIN 1 TABLET: 10; 40 TABLET ORAL at 21:44

## 2021-05-27 RX ADMIN — FLUTICASONE PROPIONATE 1 SPRAY: 50 SPRAY, METERED NASAL at 08:57

## 2021-05-27 RX ADMIN — Medication 2000 UNITS: at 08:56

## 2021-05-27 RX ADMIN — INSULIN LISPRO 6 UNITS: 100 INJECTION, SOLUTION INTRAVENOUS; SUBCUTANEOUS at 09:01

## 2021-05-27 RX ADMIN — FINASTERIDE 5 MG: 5 TABLET, FILM COATED ORAL at 08:56

## 2021-05-27 RX ADMIN — MICONAZOLE NITRATE: 20 POWDER TOPICAL at 08:57

## 2021-05-27 RX ADMIN — SODIUM CHLORIDE: 9 INJECTION, SOLUTION INTRAVENOUS at 21:41

## 2021-05-27 RX ADMIN — CEFDINIR 300 MG: 300 CAPSULE ORAL at 08:56

## 2021-05-27 RX ADMIN — CEFDINIR 300 MG: 300 CAPSULE ORAL at 21:40

## 2021-05-27 RX ADMIN — METOPROLOL SUCCINATE 100 MG: 100 TABLET, EXTENDED RELEASE ORAL at 08:56

## 2021-05-27 RX ADMIN — INSULIN LISPRO 4 UNITS: 100 INJECTION, SOLUTION INTRAVENOUS; SUBCUTANEOUS at 17:23

## 2021-05-27 RX ADMIN — MICONAZOLE NITRATE: 20 POWDER TOPICAL at 21:44

## 2021-05-27 RX ADMIN — MULTIPLE VITAMINS W/ MINERALS TAB 1 TABLET: TAB at 08:56

## 2021-05-27 ASSESSMENT — PAIN SCALES - GENERAL
PAINLEVEL_OUTOF10: 0

## 2021-05-27 NOTE — CARE COORDINATION
5/27/21, 10:38 AM EDT    DISCHARGE ON GOING EVALUATION    Orvan Bread. Hospital day: 5  Location: [facility]27/027-A Reason for admit: Calculi, bladder, diverticulum [N21.0]   Procedure:   1. Cystoscopy with clot evacuation and fulguration of bleeding bladder tissue. 2.  Cystoscopy with litholapaxy for stone less than 2.5 cm.     Barriers to Discharge: WBC 11.1, Hgb 8.6, Urology following, CBI clamped, ok to resume anticoagulants, INR is 1.13, IV fluids, oral Omnicef, diabetes management, Heparin drip, PT/OT. PCP: Nikhil Smith MD  Readmission Risk Score: 15%  Patient Goals/Plan/Treatment Preferences: From home with spouse, plan is [facility] at discharge. SW following.

## 2021-05-27 NOTE — PROGRESS NOTES
PROGRESS NOTE      Patient:  Bernardino Friedman Unit/Bed:-27/027-A    YOB: 1940    MRN: [id number]       Acct: [de-identified]     PCP: Lali Vigil MD    Date of Admission: 5/22/2021      Assessment/Plan:    Anticipated Discharge: pending clinical improvement     Active Hospital Problems    Diagnosis Date Noted    Complicated urinary tract infection [N39.0]     Nephrolithiasis [N20.0]     Hematoma of right flank [S30.1XXA]     Urinary retention [R33.9]     Gross hematuria [R31.0]     Bleeding on Coumadin [R58, T45.515A]     Acute cystitis with hematuria [N30.01]     Calculi, bladder, diverticulum [N21.0] 05/22/2021       Gross Hematuria secondary to nephrolithiasis/cystolithiasis, improving  -CT abd/plv showed nonobstructing calculi both kidneys and in the urinary bladder.  Acute cystitis.  7 cm and 2.4 cm urinary bladder diverticuli.  Large mass in the dorsal subcutaneous tissues of the lower right flank and hemipelvis most suggestive of a hematoma which measures approximately 10 x 12 x 19 cm.    -Urology consulted - POD #1 Cystoscopy, clot evacuation and fulguration of bleeders and bladder stone removal by Dr Madeleine Painter   -CBI clamped as of 0500 today, draining pink-tinged urine.  -Okay to resume anticoagulation per urology     Nephrolithiasis/Cystolithiasis  -Please see treatment above     Acute Complicated UTI  -Risk factors include male, history of DM, nephrolithiasis, cytolithiasis, bladder diverticulum and urinary retention   -Urine culture grew Granulicatella adiacens   -Initially started on Ceftriaxone, now on Cefdinir day 3 - to finish 10-14 course of treatment    Urinary Retention  -Currently with reilly; CBI is clamped as of 0500 this am  -Continue flomax  -Urology following - plan for voiding trial tomorrow     Right Flank Hematoma  -Seen on CT abd/plv: Large mass in the dorsal subcutaneous tissues of the lower right flank and hemipelvis most suggestive of a hematoma which measures approximately 10 x 12 x 19 cm  -Coumadin initially held and placed on heparin gtt - which was stopped at 1943 5/26  -Hgb stable at 8.6, continue to monitor  -Okay to resume anticoagulation per urology - pharmacy to dose coumadin   -Conservative management for now   -Serial imaging if needed     Atrial Fibrillation, currently rate-controlled   -Coumadin initially held and placed on heparin gtt - which was stopped at 1943 5/26  -Hgb stable at 8.6, continue to monitor  -Okay to resume anticoagulation per urology - pharmacy to dose coumadin - may need bridge as INR subtherapeutic  -Continue telemetry monitoring and Toprol    Essential Hypertension  -Blood pressures controlled, last 119/62  -Continue Toprol and Ramipril     DM type II  -HBA1C 7.6 on 3/21/21  -Accu-checks, continue medium corrective SSI with hypoglycemia protocol in place    Congenital Talipes Equinovarus, right   -Continue ankle bracing and physical therapy     Hyperlipidemia  -Continue home Vytorin     Depression  -Continue home Paxil    Morbid Obesity  -BMI  43.78  -Diet and lifestyle modification     Physical Deconditioning due to illness  -PT/OT ordered      Chief Complaint: Dark-colored urine    Hospital Course:   Admitted 5/22/2021. Per initial admissino H&P: \"72 y. o. male who presented to [facility] with dark urine. Patient states that today he noticed his urine was \"brown\". He denies any clots of bright red blood. He notes that he has had some intermittent dysuria over the past couple of days. Patient has also had increased urinary urgency and frequency. He denies back pain or abdominal pain. Patient also complains of fatigue that has been going on for weeks gradually. In the past patient has had issues with hematuria while on his coumadin. He notes that he recently had an INR that was > 3 and they had bumped down his warfarin dose.  Patient denies any history of GI bleed.     In the ED patient initially had Atrial multivitamin-minerals  1 tablet Oral Daily    ezetimibe-simvastatin  1 tablet Oral Nightly     PRN Meds: lidocaine, sodium chloride flush, sodium chloride, acetaminophen, glucose, dextrose, glucagon (rDNA), dextrose, heparin (porcine), heparin (porcine)      Intake/Output Summary (Last 24 hours) at 5/27/2021 0717  Last data filed at 5/26/2021 2200  Gross per 24 hour   Intake 3864.62 ml   Output -1925 ml   Net 5789.62 ml       Diet:  DIET GENERAL;    Exam:  /67   Pulse 95   Temp 97.5 °F (36.4 °C) (Oral)   Resp 18   Ht 5' 8\" (1.727 m) Comment: per patient  Wt 287 lb 14.4 oz (130.6 kg)   SpO2 96%   BMI 43.78 kg/m²     Physical Exam  Vitals reviewed. Constitutional:       Appearance: Normal appearance. He is obese. HENT:      Head: Normocephalic and atraumatic. Right Ear: External ear normal.      Left Ear: External ear normal.      Nose: Nose normal.      Mouth/Throat:      Mouth: Mucous membranes are moist.   Eyes:      Conjunctiva/sclera: Conjunctivae normal.   Cardiovascular:      Rate and Rhythm: Normal rate and regular rhythm. Pulses: Normal pulses. Heart sounds: Normal heart sounds. Pulmonary:      Effort: Pulmonary effort is normal. No respiratory distress. Breath sounds: Normal breath sounds. No wheezing, rhonchi or rales. Abdominal:      General: Abdomen is flat. Bowel sounds are normal. There is no distension. Palpations: Abdomen is soft. Tenderness: There is no abdominal tenderness. Genitourinary:     Comments: Pastor in place  Musculoskeletal:         General: No tenderness. Normal range of motion. Cervical back: Normal range of motion and neck supple. Right lower leg: Edema present. Left lower leg: Edema present. Comments: Bipedal edema, non-pitting  Right club foot   Skin:     General: Skin is warm and dry. Capillary Refill: Capillary refill takes less than 2 seconds. Neurological:      General: No focal deficit present. Mental Status: He is alert. Psychiatric:         Mood and Affect: Mood normal.         Behavior: Behavior normal.         Labs:   Recent Labs     05/25/21  0625 05/26/21  0610 05/26/21  2119 05/27/21  0640   WBC 8.3 10.2  --  11.1*   HGB 9.7* 9.1* 9.0* 8.6*   HCT 32.7* 31.4* 30.9* 29.8*    332  --  346     Recent Labs     05/25/21  0625 05/26/21  0610    140   K 4.7 4.1    107   CO2 26 24   BUN 14 10   CREATININE 0.6 0.7   CALCIUM 8.6 8.7     No results for input(s): AST, ALT, BILIDIR, BILITOT, ALKPHOS in the last 72 hours. Recent Labs     05/24/21  1245 05/25/21  0625 05/26/21  0610   INR 1.49* 1.29* 1.19*     No results for input(s): CKTOTAL, TROPONINI in the last 72 hours. Urinalysis:      Lab Results   Component Value Date    NITRU see below 05/22/2021    WBCUA 25-50 05/22/2021    WBCUA 6-10 05/30/2015    BACTERIA MANY 05/22/2021    RBCUA 50-75 05/22/2021    BLOODU see below 05/22/2021    SPECGRAV see below 05/22/2021    GLUCOSEU Negative 05/30/2015       Radiology:  CT ABDOMEN PELVIS WO CONTRAST Additional Contrast? None   Final Result   Impression:   1. Motion affected study. 2.  Nonobstructing calculi both kidneys and in the urinary bladder. Acute    cystitis. 7 cm and 2.4 cm urinary bladder diverticuli. 3.  Large mass in the dorsal subcutaneous tissues of the lower right flank    and hemipelvis most suggestive of a hematoma which measures approximately    10 x 12 x 19 cm. Recommend clinical correlation. 4.  No bowel obstruction or ascites. Colonic diverticulosis without acute    diverticulitis. Probable proctitis. This document has been electronically signed by: Dalene Collet. DO Drew on    05/22/2021 10:48 PM      All CTs at this facility use dose modulation techniques and iterative    reconstructions, and/or weight-based dosing   when appropriate to reduce radiation to a low as reasonably achievable.           Diet: DIET GENERAL;    DVT prophylaxis: [] Lovenox [] SCDs                                 [] SQ Heparin                                 [] Encourage ambulation           [x] Already on Anticoagulation     Disposition:    [] Home       [] TCU       [] Rehab       [] Psych       [] SNF       [] [facility]       [x] [address]    Code Status: Full Code    PT/OT Eval Status: completed, seeing patient      Electronically signed by Ella Robles MD on 5/27/2021 at 7:17 AM

## 2021-05-27 NOTE — PROGRESS NOTES
Urology Progress Note    Reason for Consult: Gross Hematuria, Nephrolithiasis (Non-obstructing), Complicated UTI, Bladder Diverticulum  Subjective: \"    Patient is resting in bed, CBI clamped this AM draining yellow urine, +flatus, +BM, ambulating with assistance, tolerating regular diet, denies any nausea or vomiting. There are complaints of no pain at this time. Right lower flank pain improved today- HGB stable, Urology recommends continuing conservative treatment  CBI continued overnight for hematuria, heparin paused.   CBI clamped this AM, urine is yellow  Ok to restart anticoagulants  Plan for void trial tomorrow AM in urine continue to be clear    POD #1 Cystoscopy, clot evacuation and fulguration of bleeders and bladder stone removal by Dr Sofie Alaniz:  /62   Pulse 87   Temp 98.1 °F (36.7 °C) (Oral)   Resp 18   Ht 5' 8\" (1.727 m) Comment: per patient  Wt 287 lb 14.4 oz (130.6 kg)   SpO2 97%   BMI 43.78 kg/m²   Temp  Av.1 °F (31.2 °C)  Min: 65.3 °F (18.5 °C)  Max: 98.4 °F (36.9 °C)    Intake/Output Summary (Last 24 hours) at 2021 0936  Last data filed at 2021 2200  Gross per 24 hour   Intake 3264.62 ml   Output -1325 ml   Net 4589.62 ml       Social History     Socioeconomic History    Marital status:      Spouse name: Connor Distance Number of children: 9    Years of education: 12    Highest education level: High school graduate   Occupational History    Not on file   Tobacco Use    Smoking status: Former Smoker     Packs/day: 1.50     Years: 15.00     Pack years: 22.50     Types: Cigarettes     Quit date: 1984     Years since quittin.5    Smokeless tobacco: Former User     Types: Snuff, Chew   Substance and Sexual Activity    Alcohol use: No    Drug use: No    Sexual activity: Not on file   Other Topics Concern    Not on file   Social History Narrative    Not on file     Social Determinants of Health     Financial Resource Strain: Low Risk     Difficulty of Paying Living Expenses: Not hard at all   Food Insecurity: No Food Insecurity    Worried About Running Out of Food in the Last Year: Never true    Ran Out of Food in the Last Year: Never true   Transportation Needs: No Transportation Needs    Lack of Transportation (Medical): No    Lack of Transportation (Non-Medical): No   Physical Activity: Inactive    Days of Exercise per Week: 0 days    Minutes of Exercise per Session: 0 min   Stress: Stress Concern Present    Feeling of Stress : Very much   Social Connections: Moderately Integrated    Frequency of Communication with Friends and Family: More than three times a week    Frequency of Social Gatherings with Friends and Family: More than three times a week    Attends Mandaen Services: More than 4 times per year    Active Member of [employer] or Organizations: No    Attends Club or Organization Meetings: Never    Marital Status:    Intimate Partner Violence:     Fear of Current or Ex-Partner:     Emotionally Abused:     Physically Abused:     Sexually Abused:      Family History   Problem Relation Age of Onset    Cancer Mother         esophagus    Cancer Father         colon    Cancer Brother      Allergies   Allergen Reactions    Lipitor Other (See Comments)     myalgia    Pcn [Penicillins] Rash         Constitutional: Alert and oriented times x3, no acute distress, and cooperative to examination with appropriate mood and affect. HEENT:   Head:         Normocephalic and atraumatic. Mucous membranes are normal.   Eyes:         EOM are normal.  Nose:    The external appearance of the nose is normal  Ears: The ears appear normal to external inspection. Cardiovascular:       Irregular rhythem  Pulmonary/Chest:  Normal respiratory rate and rhthym. Diminshed, wheeze   Abdominal:          Large, soft, nondistended, right side CVA tenderness no suprapubic tendnerness. Active bowel sounds.   Genitalia:    CBI draining yellow urine  Musculoskeletal:    Normal range of motion. He exhibits no edema or tenderness of lower extremities. Extremities:    No cyanosis, clubbing, or edema present. Neurological:    Alert and oriented. Labs:  WBC:    Lab Results   Component Value Date    WBC 11.1 05/27/2021     Hemoglobin/Hematocrit:    Lab Results   Component Value Date    HGB 8.6 05/27/2021    HCT 29.8 05/27/2021     BMP:    Lab Results   Component Value Date     05/27/2021    K 4.8 05/27/2021     05/27/2021    CO2 25 05/27/2021    BUN 9 05/27/2021    LABALBU 2.9 05/23/2021    CREATININE 0.7 05/27/2021    CALCIUM 8.4 05/27/2021    LABGLOM >90 05/27/2021       Assessment and Plan  Urine is yellow this AM, ok to restart anticoagulants  Plan for void trial tomorrow AM  Our office will schedule follow up in 6 weeks with Ua micro prior    POD #1 Cystoscopy, clot evacuation and fulguration of bleeders and bladder stone removal by Dr Al Vasquez     Nephrolithiasis- Per CT abdomen and pelvis. 8 mm stone on the right and 2 mm stone on the left. Non-obstructing. Patient is asymptomatic and does not want to consider treatment at this time.      Pelvic/Lower Flank Hematoma- Recommend conservative management only at this time, unless strongly suspect abscess. . Holding Coumadin. Hgb at 9.1. Admission Hgb 11.1. Hgb on 5/5/21 was 13.2. Continue serial H/H. Closely monitor.     Acute Cystitis-   Organism Abnormal  05/22/2021  7:25 PM [address] [address]    Urine Culture, Routine 05/22/2021  7:25  [address] [facility]   [place] count: >743,860 CFU/mL Granulicatella adaciens has shown susceptibility to Penicillin, Cefazolin, Minocycline and Vancomycin. It is typically resistant to Ciprofloxacin and Trimethoprim/sulfamethoxazole.       Medicine stopped Rocephin and started Omnicef    Persistent Afib- ok for anticoagulants    LILIANA Deleon - CNP, LILIANA  05/27/21 9:36 AM  Urology

## 2021-05-27 NOTE — FLOWSHEET NOTE
Prayer and encouragement      05/27/21 1703   Encounter Summary   Services provided to: Patient   Referral/Consult From: [facility] Family members   Continue Visiting Yes  (5/27)   Complexity of Encounter Low   Length of Encounter 15 minutes   Routine   Type Initial   Assessment Approachable;Calm   Intervention Prayer;Nurtured hope;[place]   Outcome Acceptance;Expressed gratitude;Encouraged; Hopeful;Receptive

## 2021-05-27 NOTE — PROGRESS NOTES
Clinical Pharmacy Note    Warfarin consult follow-up    Recent Labs     05/27/21  0640   INR 1.13     Recent Labs     05/25/21  0625 05/26/21  0610 05/26/21  2119 05/27/21  0640   HGB 9.7* 9.1* 9.0* 8.6*   HCT 32.7* 31.4* 30.9* 29.8*    332  --  346       Significant Drug-Drug Interactions:  New warfarin drug-drug interactions: none  Discontinued drug-drug interactions: none  Current warfarin drug-drug interactions: heparin drip     Date INR Warfarin Dose   5/22/2021 - 5/26/2021 - Hold today per Urology    5/27/2021 1.13   10 mg                                               Notes:                   Daily PT/INR until stable within therapeutic range.      Vamshi Downey, EvaD, BCPS  5/27/2021  2:51 PM

## 2021-05-28 ENCOUNTER — TELEPHONE (OUTPATIENT)
Dept: UROLOGY | Age: 81
End: 2021-05-28

## 2021-05-28 VITALS
WEIGHT: 300 LBS | RESPIRATION RATE: 18 BRPM | OXYGEN SATURATION: 94 % | SYSTOLIC BLOOD PRESSURE: 156 MMHG | HEART RATE: 88 BPM | HEIGHT: 68 IN | BODY MASS INDEX: 45.47 KG/M2 | DIASTOLIC BLOOD PRESSURE: 75 MMHG | TEMPERATURE: 98.6 F

## 2021-05-28 LAB
ANION GAP SERPL CALCULATED.3IONS-SCNC: 6 MEQ/L (ref 8–16)
APTT: 24 SECONDS (ref 22–38)
APTT: 24.7 SECONDS (ref 22–38)
APTT: 26.9 SECONDS (ref 22–38)
BLOOD CULTURE, ROUTINE: NORMAL
BLOOD CULTURE, ROUTINE: NORMAL
BUN BLDV-MCNC: 10 MG/DL (ref 7–22)
CALCIUM SERPL-MCNC: 8.3 MG/DL (ref 8.5–10.5)
CHLORIDE BLD-SCNC: 106 MEQ/L (ref 98–111)
CO2: 26 MEQ/L (ref 23–33)
CREAT SERPL-MCNC: 0.7 MG/DL (ref 0.4–1.2)
ERYTHROCYTE [DISTWIDTH] IN BLOOD BY AUTOMATED COUNT: 14.8 % (ref 11.5–14.5)
ERYTHROCYTE [DISTWIDTH] IN BLOOD BY AUTOMATED COUNT: 56.9 FL (ref 35–45)
GFR SERPL CREATININE-BSD FRML MDRD: > 90 ML/MIN/1.73M2
GLUCOSE BLD-MCNC: 171 MG/DL (ref 70–108)
GLUCOSE BLD-MCNC: 200 MG/DL (ref 70–108)
GLUCOSE BLD-MCNC: 261 MG/DL (ref 70–108)
GLUCOSE BLD-MCNC: 300 MG/DL (ref 70–108)
HCT VFR BLD CALC: 28.6 % (ref 42–52)
HEMOGLOBIN: 8.4 GM/DL (ref 14–18)
INR BLD: 1.11 (ref 0.85–1.13)
MCH RBC QN AUTO: 31.8 PG (ref 26–33)
MCHC RBC AUTO-ENTMCNC: 29.4 GM/DL (ref 32.2–35.5)
MCV RBC AUTO: 108.3 FL (ref 80–94)
PLATELET # BLD: 298 THOU/MM3 (ref 130–400)
PMV BLD AUTO: 8.6 FL (ref 9.4–12.4)
POTASSIUM SERPL-SCNC: 4 MEQ/L (ref 3.5–5.2)
RBC # BLD: 2.64 MILL/MM3 (ref 4.7–6.1)
SARS-COV-2, NAAT: NOT DETECTED
SODIUM BLD-SCNC: 138 MEQ/L (ref 135–145)
WBC # BLD: 10.8 THOU/MM3 (ref 4.8–10.8)

## 2021-05-28 PROCEDURE — 99232 SBSQ HOSP IP/OBS MODERATE 35: CPT | Performed by: UROLOGY

## 2021-05-28 PROCEDURE — 99239 HOSP IP/OBS DSCHRG MGMT >30: CPT | Performed by: FAMILY MEDICINE

## 2021-05-28 PROCEDURE — 80048 BASIC METABOLIC PNL TOTAL CA: CPT

## 2021-05-28 PROCEDURE — 36415 COLL VENOUS BLD VENIPUNCTURE: CPT

## 2021-05-28 PROCEDURE — 6370000000 HC RX 637 (ALT 250 FOR IP)

## 2021-05-28 PROCEDURE — 6370000000 HC RX 637 (ALT 250 FOR IP): Performed by: STUDENT IN AN ORGANIZED HEALTH CARE EDUCATION/TRAINING PROGRAM

## 2021-05-28 PROCEDURE — 85730 THROMBOPLASTIN TIME PARTIAL: CPT

## 2021-05-28 PROCEDURE — 6370000000 HC RX 637 (ALT 250 FOR IP): Performed by: UROLOGY

## 2021-05-28 PROCEDURE — 85610 PROTHROMBIN TIME: CPT

## 2021-05-28 PROCEDURE — 87635 SARS-COV-2 COVID-19 AMP PRB: CPT

## 2021-05-28 PROCEDURE — 82948 REAGENT STRIP/BLOOD GLUCOSE: CPT

## 2021-05-28 PROCEDURE — 85027 COMPLETE CBC AUTOMATED: CPT

## 2021-05-28 PROCEDURE — 2580000003 HC RX 258: Performed by: UROLOGY

## 2021-05-28 RX ORDER — WARFARIN SODIUM 10 MG/1
10 TABLET ORAL ONCE
Status: COMPLETED | OUTPATIENT
Start: 2021-05-28 | End: 2021-05-28

## 2021-05-28 RX ORDER — WARFARIN SODIUM 10 MG/1
TABLET ORAL
Qty: 30 TABLET | Refills: 1 | Status: SHIPPED | OUTPATIENT
Start: 2021-05-28

## 2021-05-28 RX ORDER — CEFDINIR 300 MG/1
300 CAPSULE ORAL EVERY 12 HOURS SCHEDULED
Qty: 14 CAPSULE | Refills: 0 | Status: SHIPPED | OUTPATIENT
Start: 2021-05-28 | End: 2021-06-04

## 2021-05-28 RX ORDER — TAMSULOSIN HYDROCHLORIDE 0.4 MG/1
0.4 CAPSULE ORAL DAILY
Qty: 30 CAPSULE | Refills: 3 | Status: SHIPPED | OUTPATIENT
Start: 2021-05-29

## 2021-05-28 RX ORDER — CALCIUM CARBONATE 200(500)MG
500 TABLET,CHEWABLE ORAL 3 TIMES DAILY PRN
Status: DISCONTINUED | OUTPATIENT
Start: 2021-05-28 | End: 2021-05-29 | Stop reason: HOSPADM

## 2021-05-28 RX ADMIN — INSULIN LISPRO 6 UNITS: 100 INJECTION, SOLUTION INTRAVENOUS; SUBCUTANEOUS at 12:45

## 2021-05-28 RX ADMIN — FLUTICASONE PROPIONATE 1 SPRAY: 50 SPRAY, METERED NASAL at 08:09

## 2021-05-28 RX ADMIN — PAROXETINE HYDROCHLORIDE 40 MG: 20 TABLET, FILM COATED ORAL at 08:07

## 2021-05-28 RX ADMIN — Medication 2000 UNITS: at 08:07

## 2021-05-28 RX ADMIN — METOPROLOL SUCCINATE 100 MG: 100 TABLET, EXTENDED RELEASE ORAL at 08:08

## 2021-05-28 RX ADMIN — FINASTERIDE 5 MG: 5 TABLET, FILM COATED ORAL at 08:07

## 2021-05-28 RX ADMIN — ANTACID TABLETS 500 MG: 500 TABLET, CHEWABLE ORAL at 13:30

## 2021-05-28 RX ADMIN — INSULIN LISPRO 4 UNITS: 100 INJECTION, SOLUTION INTRAVENOUS; SUBCUTANEOUS at 07:22

## 2021-05-28 RX ADMIN — TAMSULOSIN HYDROCHLORIDE 0.4 MG: 0.4 CAPSULE ORAL at 08:08

## 2021-05-28 RX ADMIN — CEFDINIR 300 MG: 300 CAPSULE ORAL at 08:08

## 2021-05-28 RX ADMIN — RAMIPRIL 10 MG: 10 CAPSULE ORAL at 08:08

## 2021-05-28 RX ADMIN — ACETAMINOPHEN 650 MG: 325 TABLET ORAL at 08:15

## 2021-05-28 RX ADMIN — MICONAZOLE NITRATE: 20 POWDER TOPICAL at 08:09

## 2021-05-28 RX ADMIN — SODIUM CHLORIDE, PRESERVATIVE FREE 10 ML: 5 INJECTION INTRAVENOUS at 08:08

## 2021-05-28 RX ADMIN — MULTIPLE VITAMINS W/ MINERALS TAB 1 TABLET: TAB at 08:08

## 2021-05-28 RX ADMIN — INSULIN LISPRO 6 UNITS: 100 INJECTION, SOLUTION INTRAVENOUS; SUBCUTANEOUS at 16:48

## 2021-05-28 RX ADMIN — WARFARIN SODIUM 10 MG: 10 TABLET ORAL at 17:51

## 2021-05-28 ASSESSMENT — PAIN SCALES - GENERAL
PAINLEVEL_OUTOF10: 0
PAINLEVEL_OUTOF10: 3
PAINLEVEL_OUTOF10: 0
PAINLEVEL_OUTOF10: 2
PAINLEVEL_OUTOF10: 4

## 2021-05-28 NOTE — DISCHARGE SUMMARY
appears in no acute distress. INR 1.8 on admission. \"      5/25: continue to hold coumadin as urine is still bloody. Urology inserted 3-way reilly catheter and began CBI. Urine culture grew granulicatella adiacens - antibiotic switched to Cefdinir.      5/26: went to OR for cystoscopy with clot evacuation; heparin gtt - which was stopped at 1943   5/27: CBI clamped at 0500; okay to resume anticoagulation per urology - pharmacy to dose coumadin   5/28/21: INR subtherapeutic at 1.11 this am. Will bridge with Lovenox upon discharge. Reilly removed and passed voiding trial without hematuria. Plan of care updated to reflect changes today and recommendations for discharge. Gross Hematuria secondary to nephrolithiasis/cystolithiasis, improving  -CT abd/plv showed nonobstructing calculi both kidneys and in the urinary bladder.  Acute cystitis.  7 cm and 2.4 cm urinary bladder diverticuli.  Large mass in the dorsal subcutaneous tissues of the lower right flank and hemipelvis most suggestive of a hematoma which measures approximately 10 x 12 x 19 cm.    -Urology consulted - POD #2 Cystoscopy, clot evacuation and fulguration of bleeders and bladder stone removal by Dr Blanco De Luna   -Resumed anticoagulation 5/27/21  -Reilly removed 5/28/21   -Follow up with urology in 6 weeks     Nephrolithiasis/Cystolithiasis  -Please see treatment above     Bladder diverticulum  -Urology following - follow up in 6 weeks    Acute Complicated UTI  -Risk factors include male, history of DM, nephrolithiasis, cytolithiasis, bladder diverticulum and urinary retention   -Urine culture grew Granulicatella adiacens   -Initially started on Ceftriaxone x 3 days, now on Cefdinir day 4 - to finish 14-day course of treatment --> Cefdinir x 7 additional days  -Order for UA before follow-up with Dr. Blanco De Luna in 6 weeks     Urinary Retention  -Reilly removed and passed voiding trial  -Continue Flomax at discharge     Right Flank Hematoma  -Seen on CT abd/plv: Large mass in the dorsal subcutaneous tissues of the lower right flank and hemipelvis most suggestive of a hematoma which measures approximately 10 x 12 x 19 cm  -Coumadin initially held and placed on heparin gtt - which was stopped at 1943 5/26  -Hgb stable at 8.4, continue to monitor  -Anticoagulation resumed  -Conservative management for now - follow up with urology in 6 weeks     Atrial Fibrillation, currently rate-controlled   -Coumadin initially held and placed on heparin gtt - which was stopped at 1943 5/26  -Hgb stable at 8.6, continue to monitor  -Coumadin resumed 5/27/21   -INR subtherapeutic this am at 1.11 - will bridge with Lovenox 120mg/0.8cc Q12H SC x 5 days. Instructions to give 10 mg coumadin 5/29/21 / 7.5 mg 5/30/21 and 5/31/21 and to check INR on 6/1.     Essential Hypertension  -Blood pressures controlled, last 140/95  -Continue Toprol and Ramipril     DM type II  -HBA1C 7.6 on 3/21/21  -Accu-checks, medium corrective SSI with hypoglycemia protocol in place while hospitalized  -Resume home regimen at discharge     Congenital Talipes Equinovarus, right   -Continue ankle bracing and physical therapy     Hyperlipidemia  -Continue home Vytorin     Depression  -Continue home Paxil     Morbid Obesity  -BMI  45.61 kg/m2  -Diet and lifestyle modification     Physical Deconditioning due to illness  -PT/OT ordered. [facility] PT/OT at [facility].     Exam:     Vitals:  Vitals:    05/28/21 0315 05/28/21 0815 05/28/21 1147 05/28/21 1547   BP: 112/66 127/60 (!) 140/95 (!) 156/75   Pulse: 76 86 83 88   Resp: 18 18 18 18   Temp: 98 °F (36.7 °C) 98.1 °F (36.7 °C) 98.2 °F (36.8 °C) 98.6 °F (37 °C)   TempSrc: Oral Oral Oral Oral   SpO2: 97% 97% 97% 94%   Weight: 300 lb (136.1 kg)      Height:         Weight: Weight: 300 lb (136.1 kg)     24 hour intake/output:    Intake/Output Summary (Last 24 hours) at 5/28/2021 1556  Last data filed at 5/28/2021 1326  Gross per 24 hour   Intake 796.19 ml   Output 2425 ml   Net -1628.81 ml Physical Exam  Vitals reviewed. Constitutional:       Appearance: Normal appearance. He is obese. HENT:      Head: Normocephalic and atraumatic. Right Ear: External ear normal.      Left Ear: External ear normal.      Nose: Nose normal.      Mouth/Throat:      Mouth: Mucous membranes are moist.   Eyes:      Conjunctiva/sclera: Conjunctivae normal.   Cardiovascular:      Rate and Rhythm: Normal rate and regular rhythm. Pulses: Normal pulses. Heart sounds: Normal heart sounds. Pulmonary:      Effort: Pulmonary effort is normal. No respiratory distress. Breath sounds: Normal breath sounds. No wheezing, rhonchi or rales. Abdominal:      General: Abdomen is flat. Bowel sounds are normal. There is no distension. Palpations: Abdomen is soft. Tenderness: There is no abdominal tenderness. Genitourinary:     Comments: Pastor in place  Musculoskeletal:         General: No tenderness. Normal range of motion. Cervical back: Normal range of motion and neck supple. Right lower leg: Edema present. Left lower leg: Edema present. Comments: Bipedal edema, non-pitting  Right club foot   Skin:     General: Skin is warm and dry. Capillary Refill: Capillary refill takes less than 2 seconds. Neurological:      General: No focal deficit present. Mental Status: He is alert. Psychiatric:         Mood and Affect: Mood normal.         Behavior: Behavior normal.     Labs:  For convenience and continuity at follow-up the following most recent labs are provided:      CBC:    Lab Results   Component Value Date    WBC 10.8 05/28/2021    HGB 8.4 05/28/2021    HCT 28.6 05/28/2021     05/28/2021       Renal:    Lab Results   Component Value Date     05/28/2021    K 4.0 05/28/2021     05/28/2021    CO2 26 05/28/2021    BUN 10 05/28/2021    CREATININE 0.7 05/28/2021    CALCIUM 8.3 05/28/2021         Significant Diagnostic Studies    Radiology:   CT ABDOMEN PELVIS WO CONTRAST Additional Contrast? None   Final Result   Impression:   1. Motion affected study. 2.  Nonobstructing calculi both kidneys and in the urinary bladder. Acute    cystitis. 7 cm and 2.4 cm urinary bladder diverticuli. 3.  Large mass in the dorsal subcutaneous tissues of the lower right flank    and hemipelvis most suggestive of a hematoma which measures approximately    10 x 12 x 19 cm. Recommend clinical correlation. 4.  No bowel obstruction or ascites. Colonic diverticulosis without acute    diverticulitis. Probable proctitis. This document has been electronically signed by: Magdalene Linares. DO Drew on    05/22/2021 10:48 PM      All CTs at this facility use dose modulation techniques and iterative    reconstructions, and/or weight-based dosing   when appropriate to reduce radiation to a low as reasonably achievable. Consults:     IP CONSULT TO UROLOGY  IP CONSULT TO SOCIAL WORK  PHARMACY TO DOSE WARFARIN  PHARMACY TO DOSE WARFARIN    Disposition:    [] Home       [] TCU       [] Rehab       [] Psych       [] SNF       [] [place]       [x] Other- [address] [place] [address]    Condition at Discharge: Stable    Code Status:  Full Code     Patient Instructions:    Discharge lab work: Follow up with coumadin clinic for INR / UA before urology appointment  Activity: up with assistance  Diet: DIET GENERAL;      Follow-up visits:    [address]  [address] [facility]  [phone]        Abran Dalal MD  [address] [facility] Prosper Ibanez [address] [place] [address] [phone]    In 6 weeks           Discharge Medications:      Rafal Singh.    Home Medication Instructions UFL:[id number]    Printed on:05/28/21 1555   Medication Information                      acetaminophen (TYLENOL) 500 MG tablet  Take 500 mg by mouth every 6 hours as needed for Pain or Fever Don't take more than 3,000 mg each day, including what's in Tylenol P.M.             aspirin EC 81 MG EC tablet  Take 81 mg by mouth daily. Blood thinner; with food. bumetanide (BUMEX) 1 MG tablet  TAKE 1 TABLET DAILY AS NEEDED (FLUID OVERLOAD, WEIGHT GAIN, SHORTNESS OF BREATH)             cefdinir (OMNICEF) 300 MG capsule  Take 1 capsule by mouth every 12 hours for 7 days             Cholecalciferol (VITAMIN D-3) 5000 UNIT TABS  Take  by mouth daily. Indications: Treatment with Vitamin D             diphenhydrAMINE-APAP, sleep, (TYLENOL PM EXTRA STRENGTH)  MG tablet  Take 2 tablets by mouth nightly as needed for Sleep             dutasteride (AVODART) 0.5 MG capsule  TAKE 1 CAPSULE DAILY             enoxaparin (LOVENOX) 120 MG/0.8ML injection  Please inject 120mg/0.8mL Q12H SC x 5 days. ezetimibe-simvastatin (VYTORIN) 10-40 MG per tablet  TAKE 1 TABLET NIGHTLY             fluticasone (FLONASE) 50 MCG/ACT nasal spray  USE 2 SPRAYS NASALLY DAILY             metFORMIN (GLUCOPHAGE-XR) 500 MG extended release tablet  TAKE 2 TABLETS DAILY WITH BREAKFAST             metoprolol succinate (TOPROL XL) 100 MG extended release tablet  TAKE 1 TABLET DAILY             miconazole (MICOTIN) 2 % powder  Apply topically 2 times daily. Misc Natural Products (OSTEO BI-FLEX TRIPLE STRENGTH) TABS  Take 2 tablets by mouth daily Indications: Knee Pain             Multiple Vitamins-Minerals (PRESERVISION AREDS 2 PO)  Take by mouth daily             OLIVE LEAF EXTRACT PO  Take 4 tablets by mouth daily as needed supplement             PARoxetine (PAXIL) 40 MG tablet  Take 1 tablet by mouth every morning New dose. ramipril (ALTACE) 10 MG capsule  TAKE 1 CAPSULE DAILY             SITagliptin (JANUVIA) 100 MG tablet  Take 1 tablet by mouth daily Discontinue script for Tradjenta             tamsulosin (FLOMAX) 0.4 MG capsule  Take 1 capsule by mouth daily             warfarin (COUMADIN) 10 MG tablet  Please give 10 mg 5/29/21 / 7.5 mg on 5/30/21 and 5/31/21 and check INR 6/1/21. Time Spent on discharge is more than 1 hour in the examination, evaluation, counseling and review of medications and discharge plan. Signed: Thank you Andrew Feliz MD for the opportunity to be involved in this patient's care.     Electronically signed by Berta Ochoa MD on 5/28/2021 at 3:56 PM

## 2021-05-28 NOTE — PROGRESS NOTES
Clinical Pharmacy Note    Warfarin consult follow-up    Recent Labs     05/28/21  0607   INR 1.11     Recent Labs     05/26/21  0610 05/27/21  0640 05/27/21  1827 05/28/21  0607   HGB 9.1* 8.6* 8.4* 8.4*   HCT 31.4* 29.8* 28.9* 28.6*    346  --  298       Significant Drug-Drug Interactions:  New warfarin drug-drug interactions: none  Discontinued drug-drug interactions: none  Current warfarin drug-drug interactions: heparin drip     Date INR Warfarin Dose   5/22/2021 - 5/26/2021 - Hold per Urology   5/27/2021 1.13 10 mg   5/28/2021 1.11 10 mg                                     Notes:                   Daily PT/INR until stable within therapeutic range.

## 2021-05-28 NOTE — PROGRESS NOTES
Clinical Pharmacy Note                                               Warfarin Discharge Recommendations      Pt discharged from [facility] today after admission for hematuria    INR today:  Recent Labs     05/28/21  0607   INR 1.11       Interacting medications at discharge: Lovenox    INR goal during admission: 2-3    Warfarin dosing prior to discharge:  Date INR Warfarin Dose   5/22/2021 - 5/26/2021 - Hold per Urology   5/27/2021 1.13 10 mg   5/28/2021 1.11 10 mg     Recommendations for discharge:   Date Warfarin Dose   5/29/21 10 mg   5/30/21 7.5 mg   5/31/21 7.5 mg   6/1/21 INR     Provider dosing warfarin: ECF Provider    Recheck INR:  6/1/21 with results to [facility] provider

## 2021-05-28 NOTE — PROGRESS NOTES
Urology Progress Note    Reason for Consult: Gross Hematuria, Nephrolithiasis (Non-obstructing), Complicated UTI, Bladder Diverticulum  Subjective: \"    Patient is resting in bed, voiding yellow urine spontaneously, +flatus, +BM, ambulating with assistance, tolerating regular diet, denies any nausea or vomiting. There are complaints of no pain at this time.     Right lower flank pain improved today- HGB stable, Urology recommends continuing conservative treatment  Pastor removed this AM, he is voiding, no hematuria, he is on anticoags    POD #1 Cystoscopy, clot evacuation and fulguration of bleeders and bladder stone removal by Dr Felice Christie:  BP (!) 156/75   Pulse 88   Temp 98.6 °F (37 °C) (Oral)   Resp 18   Ht 5' 8\" (1.727 m) Comment: per patient  Wt 300 lb (136.1 kg)   SpO2 94%   BMI 45.61 kg/m²   Temp  Av.2 °F (36.8 °C)  Min: 98 °F (36.7 °C)  Max: 98.6 °F (37 °C)    Intake/Output Summary (Last 24 hours) at 2021 1900  Last data filed at 2021 1705  Gross per 24 hour   Intake 916.19 ml   Output 2425 ml   Net -1508.81 ml       Social History     Socioeconomic History    Marital status:      Spouse name: Florin Pollock Number of children: 7    Years of education: 12    Highest education level: High school graduate   Occupational History    Not on file   Tobacco Use    Smoking status: Former Smoker     Packs/day: 1.50     Years: 15.00     Pack years: 22.50     Types: Cigarettes     Quit date: 1984     Years since quittin.5    Smokeless tobacco: Former User     Types: Snuff, Chew   Substance and Sexual Activity    Alcohol use: No    Drug use: No    Sexual activity: Not on file   Other Topics Concern    Not on file   Social History Narrative    Not on file     Social Determinants of Health     Financial Resource Strain: Low Risk     Difficulty of Paying Living Expenses: Not hard at all   Food Insecurity: No Food Insecurity    Worried About [address] [facility] in the Last Year: Never true   [address] in the Last Year: Never true   Transportation Needs: No Transportation Needs    Lack of Transportation (Medical): No    Lack of Transportation (Non-Medical): No   Physical Activity: Inactive    Days of Exercise per Week: 0 days    Minutes of Exercise per Session: 0 min   Stress: Stress Concern Present    Feeling of Stress : Very much   Social Connections: Moderately Integrated    Frequency of Communication with Friends and Family: More than three times a week    Frequency of Social Gatherings with Friends and Family: More than three times a week    Attends [employer] Services: More than 4 times per year    Active Member of [employer] or Organizations: No    Attends Club or Organization Meetings: Never    Marital Status:    Intimate Partner Violence:     Fear of Current or Ex-Partner:     Emotionally Abused:     Physically Abused:     Sexually Abused:      Family History   Problem Relation Age of Onset    Cancer Mother         esophagus    Cancer Father         colon    Cancer Brother      Allergies   Allergen Reactions    Lipitor Other (See Comments)     myalgia    Pcn [Penicillins] Rash         Constitutional: Alert and oriented times x3, no acute distress, and cooperative to examination with appropriate mood and affect. HEENT:   Head:         Normocephalic and atraumatic. Mucous membranes are normal.   Eyes:         EOM are normal.  Nose:    The external appearance of the nose is normal  Ears: The ears appear normal to external inspection. Cardiovascular:       Irregular rhythem  Pulmonary/Chest:  Normal respiratory rate and rhthym. Diminshed, wheeze   Abdominal:          Large, soft, nondistended, right side CVA tenderness no suprapubic tendnerness. Active bowel sounds. Genitalia:    CBI draining yellow urine  Musculoskeletal:    Normal range of motion. He exhibits no edema or tenderness of lower extremities.    Extremities:    No cyanosis, clubbing, or edema present. Neurological:    Alert and oriented. Labs:  WBC:    Lab Results   Component Value Date    WBC 10.8 05/28/2021     Hemoglobin/Hematocrit:    Lab Results   Component Value Date    HGB 8.4 05/28/2021    HCT 28.6 05/28/2021     BMP:    Lab Results   Component Value Date     05/28/2021    K 4.0 05/28/2021     05/28/2021    CO2 26 05/28/2021    BUN 10 05/28/2021    LABALBU 2.9 05/23/2021    CREATININE 0.7 05/28/2021    CALCIUM 8.3 05/28/2021    LABGLOM >90 05/28/2021       Assessment and Plan  Ok for sidsaskiarge from urology standpoint  Right lower flank pain improved today- HGB stable, Urology recommends continuing conservative treatment  Pastor removed this AM, he is voiding, no hematuria, he is on anticoags  Our office will schedule follow up in 6 weeks with Ua micro prior    POD #2 Cystoscopy, clot evacuation and fulguration of bleeders and bladder stone removal by Dr Denise Child     Nephrolithiasis- Per CT abdomen and pelvis. 8 mm stone on the right and 2 mm stone on the left. Non-obstructing. Patient is asymptomatic and does not want to consider treatment at this time.      Pelvic/Lower Flank Hematoma- Recommend conservative management only at this time, unless strongly suspect abscess. . Holding Coumadin. Hgb at 8.4. Admission Hgb 11.1. Hgb on 5/5/21 was 13.2. Continue serial H/H. Closely monitor.     Acute Cystitis-   Organism Abnormal  05/22/2021  7:25 PM [address]    Urine Culture, Routine 05/22/2021  7:25  [address] [facility]   [place] count: >878,136 CFU/mL Granulicatella adaciens has shown susceptibility to Penicillin, Cefazolin, Minocycline and Vancomycin. It is typically resistant to Ciprofloxacin and Trimethoprim/sulfamethoxazole.       Medicine stopped Rocephin and started Omnicef    Persistent Afib- ok for anticoagulants    LILIANA Gerardo - CNP, APRN  05/28/21 7:00 PM  Urology

## 2021-05-28 NOTE — PLAN OF CARE
Problem: DISCHARGE BARRIERS  Goal: Patient's continuum of care needs are met  Outcome: Ongoing  Note: Patient needs ECF at discharge. See  notes 5/24/21.
Problem: Falls - Risk of:  Goal: Will remain free from falls  Description: Will remain free from falls  5/23/2021 1536 by Estela Pitts RN  Outcome: Ongoing  Call light within reach. Side rails up x2. Bed alarm on. Non skid slippers available. Problem: Skin Integrity:  Goal: Absence of new skin breakdown  Description: Absence of new skin breakdown  5/23/2021 1536 by Estela Pitts RN  Outcome: Ongoing  Patient free from skin breakdown. Patient encouraged to reposition with nursing staff every 2 hours and as needed with pillow support. Patient refused turning per staff. Will continue to monitor. Problem: Discharge Planning:  Goal: Discharged to appropriate level of care  Description: Discharged to appropriate level of care  Outcome: Ongoing  Discharge planning in progress. Social work following. Patient prefers rehab at discharge. Problem: Pain:  Goal: Pain level will decrease  Description: Pain level will decrease  Outcome: Ongoing  Patient free from pain this shift. Pain rated on 0-10 pain rating scale. Pain goal 3/10. Will continue to reassess. Problem: Urinary Elimination:  Goal: Signs and symptoms of infection will decrease  Description: Signs and symptoms of infection will decrease  Outcome: Ongoing  Patient on IV rocephin for UTI. 0.9% at 150/hr. Patient afebrile and VSS. Care plan reviewed with patient and family. Patient and family verbalize understanding of the plan of care and contribute to goal setting.
Problem: Falls - Risk of:  Goal: Will remain free from falls  Description: Will remain free from falls  5/24/2021 0009 by Aurea Haywood RN  Outcome: Ongoing  5/23/2021 1536 by Joselin Prado RN  Outcome: Ongoing  Goal: Absence of physical injury  Description: Absence of physical injury  5/24/2021 0009 by Aurea Haywood RN  Outcome: Ongoing  5/23/2021 1536 by Joselin Prado RN  Outcome: Ongoing     Problem: Skin Integrity:  Goal: Will show no infection signs and symptoms  Description: Will show no infection signs and symptoms  5/24/2021 0009 by Aurea Haywood RN  Outcome: Ongoing  5/23/2021 1536 by Joselin Prado RN  Outcome: Ongoing  Goal: Absence of new skin breakdown  Description: Absence of new skin breakdown  5/24/2021 0009 by Aurea Haywood RN  Outcome: Ongoing  5/23/2021 1536 by Joselin Prado RN  Outcome: Ongoing     Problem: Discharge Planning:  Goal: Discharged to appropriate level of care  Description: Discharged to appropriate level of care  5/24/2021 0009 by Aurea Haywood RN  Outcome: Ongoing  5/23/2021 1536 by Joselin Prado RN  Outcome: Ongoing     Problem: Pain:  Goal: Pain level will decrease  Description: Pain level will decrease  5/24/2021 0009 by Aurea Haywood RN  Outcome: Ongoing  5/23/2021 1536 by Joselin Prado RN  Outcome: Ongoing     Problem: Urinary Elimination:  Goal: Signs and symptoms of infection will decrease  Description: Signs and symptoms of infection will decrease  5/24/2021 0009 by Aurea Haywood RN  Outcome: Ongoing  5/23/2021 1536 by Joselin Prado RN  Outcome: Ongoing
Problem: Falls - Risk of:  Goal: Will remain free from falls  Description: Will remain free from falls  5/26/2021 2304 by Kelly Randhawa RN  Outcome: Ongoing  Note: No falls noted this shift, patient uses call light appropriately and waits for staff prior to transferring self. Patient able to voice needs appropriately. Gait is steady with walker / gait belt and staff assistance. Patient is extensive assistance with most ADLs.     5/26/2021 1515 by Nolvia Blancas RN  Outcome: Ongoing  Goal: Absence of physical injury  Description: Absence of physical injury  5/26/2021 2304 by Kelly Randhawa RN  Outcome: Ongoing  Note: No new physical injury noted this shift so far.     5/26/2021 1515 by Nolvia Blancas RN  Outcome: Ongoing     Problem: Skin Integrity:  Goal: Absence of new skin breakdown  Description: Absence of new skin breakdown  5/26/2021 2304 by Kelly Randhawa RN  Outcome: Ongoing  Note: Patient's skin is appropriate for ethnicity, warm, and dry, with no new skin issues noted this shift. Mucous membranes are pink, moist, and intact. 5/26/2021 1515 by Nolvia Blancas RN  Outcome: Ongoing     Problem: Discharge Planning:  Goal: Discharged to appropriate level of care  Description: Discharged to appropriate level of care  5/26/2021 2304 by Kelly Randhawa RN  Outcome: Ongoing  Note: Patient's discharge planning continues, patient plans of being discharged to [facility]. 5/26/2021 1515 by Nolvia Blancas RN  Outcome: Ongoing     Problem: Pain:  Description: Pain management should include both nonpharmacologic and pharmacologic interventions.   Goal: Pain level will decrease  Description: Pain level will decrease  5/26/2021 2304 by Kelly Randhawa RN  Outcome: Ongoing  Note: Patient denies pain this shift so far.   5/26/2021 1515 by Nolvia Blancas RN  Outcome: Ongoing     Problem: Urinary Elimination:  Goal: Signs and symptoms of infection will decrease  Description: Signs and
Problem: Falls - Risk of:  Goal: Will remain free from falls  Description: Will remain free from falls  5/27/2021 0912 by Michelle Muse RN  Outcome: Met This Shift  5/26/2021 2304 by Leyla Fowler RN  Outcome: Ongoing  Note: No falls noted this shift, patient uses call light appropriately and waits for staff prior to transferring self. Patient able to voice needs appropriately. Gait is steady with walker / gait belt and staff assistance. Patient is extensive assistance with most ADLs. Problem: Pain:  Goal: Pain level will decrease  Description: Pain level will decrease  5/27/2021 0912 by Michelle Muse RN  Outcome: Met This Shift  5/26/2021 2304 by Leyla Fowler RN  Outcome: Ongoing  Note: Patient denies pain this shift so far.
Problem: Falls - Risk of:  Goal: Will remain free from falls  Description: Will remain free from falls  Outcome: Ongoing  Call light within reach. Side rails up x2. Bed alarm on. Non skid slippers available. Problem: Skin Integrity:  Goal: Absence of new skin breakdown  Description: Absence of new skin breakdown  Outcome: Ongoing  Patient free from skin breakdown. Patient turns self and makes frequent positional changes. Will continue to monitor. Problem: Discharge Planning:  Goal: Discharged to appropriate level of care  Description: Discharged to appropriate level of care  Outcome: Ongoing  Patient plans to be discharged to [facility] when medically stable. Precert pending. Social work following. Problem: Pain:  Goal: Pain level will decrease  Description: Pain level will decrease  Outcome: Ongoing  Patient free from pain this shift. Pain rated on 0-10 pain rating scale. Patient's pain goal 3/10. Will continue to reassess. Problem: Urinary Elimination:  Goal: Signs and symptoms of infection will decrease  Description: Signs and symptoms of infection will decrease  Outcome: Ongoing  Patient on PO antibiotics for UTI. Care plan reviewed with patient and family. Patient and family verbalize understanding of the plan of care and contribute to goal setting.
Problem: Falls - Risk of:  Goal: Will remain free from falls  Description: Will remain free from falls  Outcome: Ongoing  Goal: Absence of physical injury  Description: Absence of physical injury  Outcome: Ongoing     Problem: Skin Integrity:  Goal: Will show no infection signs and symptoms  Description: Will show no infection signs and symptoms  Outcome: Ongoing  Goal: Absence of new skin breakdown  Description: Absence of new skin breakdown  Outcome: Ongoing
Problem: Falls - Risk of:  Goal: Will remain free from falls  Description: Will remain free from falls  Outcome: Ongoing  Goal: Absence of physical injury  Description: Absence of physical injury  Outcome: Ongoing     Problem: Skin Integrity:  Goal: Will show no infection signs and symptoms  Description: Will show no infection signs and symptoms  Outcome: Ongoing  Goal: Absence of new skin breakdown  Description: Absence of new skin breakdown  Outcome: Ongoing     Problem: Discharge Planning:  Goal: Discharged to appropriate level of care  Description: Discharged to appropriate level of care  Outcome: Ongoing     Problem: Pain:  Goal: Pain level will decrease  Description: Pain level will decrease  Outcome: Ongoing     Problem: Urinary Elimination:  Goal: Signs and symptoms of infection will decrease  Description: Signs and symptoms of infection will decrease  Outcome: Ongoing     Problem: Nutrition  Goal: Optimal nutrition therapy  5/24/2021 2348 by Stanley Carrion RN  Outcome: Ongoing  5/24/2021 1500 by Guido Leavitt RD, LD  Outcome: Ongoing     Problem: DISCHARGE BARRIERS  Goal: Patient's continuum of care needs are met  5/24/2021 2348 by Stanley Carrion RN  Outcome: Ongoing  5/24/2021 1520 by CARLOTTA Clifford, LSW  Outcome: Ongoing  Note: Patient needs ECF at discharge. See SW notes 5/24/21.
Problem: Falls - Risk of:  Goal: Will remain free from falls  Description: Will remain free from falls  Outcome: Ongoing  Goal: Absence of physical injury  Description: Absence of physical injury  Outcome: Ongoing     Problem: Skin Integrity:  Goal: Will show no infection signs and symptoms  Description: Will show no infection signs and symptoms  Outcome: Ongoing  Goal: Absence of new skin breakdown  Description: Absence of new skin breakdown  Outcome: Ongoing     Problem: Discharge Planning:  Goal: Discharged to appropriate level of care  Description: Discharged to appropriate level of care  Outcome: Ongoing     Problem: Pain:  Goal: Pain level will decrease  Description: Pain level will decrease  Outcome: Ongoing     Problem: Urinary Elimination:  Goal: Signs and symptoms of infection will decrease  Description: Signs and symptoms of infection will decrease  Outcome: Ongoing     Problem: Nutrition  Goal: Optimal nutrition therapy  Outcome: Ongoing     Problem: DISCHARGE BARRIERS  Goal: Patient's continuum of care needs are met  Outcome: Ongoing
Problem: Nutrition  Goal: Optimal nutrition therapy  Outcome: Ongoing   Nutrition Problem #1: Increased nutrient needs  Intervention: Food and/or Nutrient Delivery: Continue Current Diet, Vitamin Supplement  Nutritional Goals: Patient will continue with good intake of 75% or greater of meals during LOS
Problem: Nutrition  Goal: Optimal nutrition therapy  Outcome: Ongoing   Nutrition Problem #1: Increased nutrient needs  Intervention: Food and/or Nutrient Delivery: Continue Current Diet, Vitamin Supplement  Nutritional Goals: Patient will continue with good intake of 75% or greater of meals during LOS
No

## 2021-05-28 NOTE — PROGRESS NOTES
Comprehensive Nutrition Assessment    Type and Reason for Visit:  Reassess    Nutrition Recommendations/Plan:   Diet as per Doctor. Consider adding carbohydrate control modification to aid in blood glucose control. Continue MVI as ordered. Nutrition Assessment:    Pt. Improving from nutritional standpoint AEB good appetite, 100% intake of meals. At risk for further nutrition compromise r/t admitted with UTI, recent fall onto bottom resulting in tear vs DTI nonblanchable, increased nutrient needs for healing, advanced age, obese, and underlying medical condition (hx: former smoker, depression, DM, HLD, HTN, PE). Nutrition recommendations/interventions as per above. Malnutrition Assessment:  Malnutrition Status:  No malnutrition    Context:  Acute Illness     Findings of the 6 clinical characteristics of malnutrition:  Energy Intake:  No significant decrease in energy intake  Weight Loss:  No significant weight loss     Body Fat Loss:  No significant body fat loss     Muscle Mass Loss:  No significant muscle mass loss    Fluid Accumulation:  1 - Mild (+2 edema)     Strength:  Not Performed    Estimated Daily Nutrient Needs:  Energy (kcal):  ~0210-0121 (~13-15 kcals/kg); Weight Used for Energy Requirements:  Current (131kg - 5/24)     Protein (g):  140 (2.0 grams/kg); Weight Used for Protein Requirements:  Ideal (70kg - ideal)          Nutrition Related Findings:  Patient seen while eating lunch, reports great appetite continues, intake 100% meals, loves the food here, note diet changed per MD from carbohydrate control to general diet on 5/27; glucose 261, BUN 10, Creatinine 0.7; antibiotic, humalog, MVI, vitamin D; BM x 2 on 5/26      Wounds:   (sacrum tear vs DTI nonblanchable)       Current Nutrition Therapies:    DIET GENERAL;     Anthropometric Measures:  · Height: 5' 8\" (172.7 cm) (per patient)  · Current Body Weight: 300 lb (136.1 kg) (5/28; +1,+2 pitting edema)   · Admission Body Weight: 272 lb (123.4 kg) (5/22/21)    · Usual Body Weight: 289 lb (131.1 kg) (EMR, 12/15/20)     · Ideal Body Weight: 154 lbs;    · BMI: 45.6  · Adjusted Body Weight:  ; No Adjustment   · BMI Categories: Obese Class 3 (BMI 40.0 or greater)       Nutrition Diagnosis:   · Increased nutrient needs related to acute injury/trauma as evidenced by wounds (tear vs DTI nonblanchable to sacrum)      Nutrition Interventions:   Food and/or Nutrient Delivery:  Continue Current Diet, Vitamin Supplement  Nutrition Education/Counseling:  Education not indicated   Coordination of Nutrition Care:  Continue to monitor while inpatient    Goals:  Patient will continue with good intake of 75% or greater of meals during LOS       Nutrition Monitoring and Evaluation:   Behavioral-Environmental Outcomes:  None Identified   Food/Nutrient Intake Outcomes:  Diet Advancement/Tolerance, Food and Nutrient Intake  Physical Signs/Symptoms Outcomes:  Biochemical Data, GI Status, Fluid Status or Edema, Meal Time Behavior, Nutrition Focused Physical Findings, Skin, Weight     Discharge Planning:     Too soon to determine     Electronically signed by Bonnie Powell RD, LD on 5/28/21 at 12:39 PM EDT    Contact: [phone]

## 2021-05-28 NOTE — DISCHARGE INSTR - MEDS
Clinical Pharmacy Note                                               Warfarin Discharge Recommendations      Pt discharged from [address] [place] [address] today after admission for hematuria    INR today:  Recent Labs     05/28/21  0607   INR 1.11       Interacting medications at discharge: Lovenox    INR goal during admission: 2-3    Warfarin dosing prior to discharge:  Date INR Warfarin Dose   5/22/2021 - 5/26/2021 - Hold per Urology   5/27/2021 1.13 10 mg   5/28/2021 1.11 10 mg     Recommendations for discharge:   Date Warfarin Dose   5/29/21 10 mg   5/30/21 7.5 mg   5/31/21 7.5 mg   6/1/21 INR     Provider dosing warfarin: ECF Provider    Recheck INR:  6/1/21 with results to [facility] provider

## 2021-06-01 ENCOUNTER — TELEPHONE (OUTPATIENT)
Dept: INTERNAL MEDICINE CLINIC | Age: 81
End: 2021-06-01

## 2021-06-01 NOTE — TELEPHONE ENCOUNTER
Wife called and requested that the pharmacy be changed to [facility] on [address] .

## 2021-06-17 ENCOUNTER — CARE COORDINATION (OUTPATIENT)
Dept: CARE COORDINATION | Age: 81
End: 2021-06-17

## 2021-06-17 NOTE — CARE COORDINATION
Patient remains at [facility] with no d/c plans at this time. Will continue to follow at this time.

## 2021-06-23 ENCOUNTER — CARE COORDINATION (OUTPATIENT)
Dept: CARE COORDINATION | Age: 81
End: 2021-06-23

## 2021-06-23 NOTE — CARE COORDINATION
[id number] Final. Someone answered and transferred me to nursing station. No one answered the phone.

## 2021-06-24 NOTE — CARE COORDINATION
Spoke with Hope Patricio who confirmed the patient remains at facility with no d/c plans at this time. I will continue to follow.

## 2021-07-01 ENCOUNTER — TELEPHONE (OUTPATIENT)
Dept: PHARMACY | Age: 81
End: 2021-07-01

## 2021-07-01 ENCOUNTER — CARE COORDINATION (OUTPATIENT)
Dept: CARE COORDINATION | Age: 81
End: 2021-07-01

## 2021-07-01 NOTE — CARE COORDINATION
[facility] and was transferred to nursing station. Mailbox is full. Will contact again later.

## 2021-07-01 NOTE — CARE COORDINATION
Spoke with BRE at [facility] who confirmed the patient d/c home today. Will notify ACM at this time.      Future Appointments   Date Time Provider Roderick Fernando   7/7/2021 11:45 AM MD YESSY Hunter [id number]   7/7/2021  3:40 PM Christianne Patterson [address]   7/19/2021  3:00 PM Daniel Morris DO [facility] [address]   9/28/2021  2:40 PM Lia Hernández MD [facility] [address]

## 2021-07-01 NOTE — TELEPHONE ENCOUNTER
Received call from [facility] with [facility]. Pt is discharging today from [facility] with [facility] services. Will plan for INR on 7/7. [facility] dosing [facility].

## 2021-07-02 PROBLEM — Z51.81 ENCOUNTER FOR THERAPEUTIC DRUG MONITORING: Status: RESOLVED | Noted: 2021-04-07 | Resolved: 2021-07-02

## 2021-07-02 NOTE — TELEPHONE ENCOUNTER
Received call from [facility], [address]. She informs that pt is now following with [facility], will discharge from [facility].

## 2021-07-06 ENCOUNTER — CARE COORDINATION (OUTPATIENT)
Dept: CARE COORDINATION | Age: 81
End: 2021-07-06

## 2021-07-06 NOTE — CARE COORDINATION
Pt was admitted to hospital from 5/22-5/28. Had cystoscopy, fulguration, evacuation of closts, and bladder stone removal on 5/26. Was d/c to [facility] for rehab on 5/28. D/c home from SNF on 7/1. I spoke with wife Gio Weslh briefly today. She informed me that on 7/2 pt signed on with hospice through [facility]. Wife reports pt got weaker vs stronger during his stay at SNF. They evaluated their options and felt hospice fit pt's needs best.     Contacted [facility]. Spoke with Sp Yusuf. Informed that they admitted pt to hospice on 7/2. Verified services. Pt will be receiving nursing, spiritual care, SW, and aide services. Dr. Hemalatha Augustine is following pt at this time. Informed Gio Welsh that if anything changes regarding hospice services to call. Will d/c from care coordination at this time.

## 2025-04-25 NOTE — CONSULTS
Consulting Provider: Dr. Tammi Jhaveri Date: 5/23/21  Reason for Consult: [place]  Hematuria, Nephrolithiasis (Non-obstructing), Complicated UTI, Bladder Diverticulum    Mr. Josefina Obrien is a 68-year-old male who presented to [facility] with dark urine and fatigue. He was seen earlier today. He reports recent onset dysuria, urinary urgency, and urinary frequency. He describes his urine as dark red to brown. He reports an intermittent history of gross hematuria over the last several years. He attributes the bleeding to his Coumadin, so when it occurs, his Coumadin dose has been adjusted temporarily. He reports a history of BPH and has seen a Urologist in the past. Per Epic review, he had an evaluation with   in July 2015 for gross hematuria. An office cystoscopy was performed demonstrating significant BPH. No bladder lesions, stones, or masses were noted. Bladder trabeculations were noted. He was started on Finasteride at this time, as it was felt that his bleeding was related to his significantly enlarged prostate. He has been taking Avodart prescribes by his PCP. He believes that he is emptying well and denies weakened stream or retention. He denies any personal history of malignancy, stones, or urinary trauma.      Past Medical History:   Diagnosis Date    Allergic rhinitis     Atrial fibrillation ([address] 2/2014    Depression     Diabetes mellitus type II 1/2012    diagnosed with HgA1c 6.6    Erectile dysfunction     Hyperlipidemia     Hypertension     Lower extremity edema     LV dysfunction     h/o, normal now    Morbid obesity ([address]     Osteoarthritis     left knee, right foot, back    PE (pulmonary embolism) 2/2014     Past Surgical History:   Procedure Laterality Date    CHOLECYSTECTOMY      PULMONARY EMBOLISM SURGERY Bilateral 2/8/2014       Allergies   Allergen Reactions    Lipitor Other (See Comments)     myalgia    Pcn [Penicillins] Rash       Current Facility-Administered Medications:     0.9 % sodium chloride infusion, , Intravenous, Continuous, Kareem Carmona DO, Last Rate: 150 mL/hr at 05/23/21 0149, Rate Change at 05/23/21 0149    sodium chloride flush 0.9 % injection 5-40 mL, 5-40 mL, Intravenous, 2 times per day, Servando Clemente DO    sodium chloride flush 0.9 % injection 5-40 mL, 5-40 mL, Intravenous, PRN, Servando Clemente DO    0.9 % sodium chloride infusion, 25 mL, Intravenous, PRN, Servando Clemente DO    cefTRIAXone (ROCEPHIN) 1000 mg IVPB in 50 mL D5W minibag, 1,000 mg, Intravenous, Q24H, Servando Clemente DO    acetaminophen (TYLENOL) tablet 650 mg, 650 mg, Oral, Q4H PRN, Kareem Carmona DO    tamsulosin [facility]) capsule 0.4 mg, 0.4 mg, Oral, Daily, Kareem Carmona DO    [Held by provider] metFORMIN (GLUCOPHAGE-XR) extended release tablet 1,000 mg, 1,000 mg, Oral, Daily with breakfast, Servando Clemente DO    metoprolol succinate (TOPROL XL) extended release tablet 100 mg, 100 mg, Oral, Daily, Karina Clemente DO    PARoxetine (PAXIL) tablet 40 mg, 40 mg, Oral, QAM, Karina Clemente DO    [Held by provider] ramipril (ALTACE) capsule 10 mg, 10 mg, Oral, Daily, Kareem Carmona DO    [Held by provider] alogliptin (NESINA) tablet 25 mg, 25 mg, Oral, Daily, Kareem Carmona DO    [Held by provider] aspirin EC tablet 81 mg, 81 mg, Oral, Daily, Servando Clemente DO    Vitamin D (CHOLECALCIFEROL) tablet 2,000 Units, 2,000 Units, Oral, Daily, Servando Clemente DO    finasteride (PROSCAR) tablet 5 mg, 5 mg, Oral, Daily, Karina Clemente DO    glucose (GLUTOSE) 40 % oral gel 15 g, 15 g, Oral, PRN, Servando Clemente DO    dextrose 50 % IV solution, 12.5 g, Intravenous, PRN, Servando Clemente DO    glucagon (rDNA) injection 1 mg, 1 mg, Intramuscular, PRN, Servando Clemente DO    dextrose 5 % solution, 100 mL/hr, Intravenous, PRN, Kareem Carmona DO    insulin lispro (HUMALOG) injection vial 0-12 Units, 0-12 Units, Subcutaneous, TID WC, Servando Clemente DO    insulin lispro (HUMALOG) injection vial 0-6 Units, 0-6 Units, Subcutaneous, Nightly, Tonio Clemente, DO    miconazole (MICOTIN) 2 % powder, , Topical, BID, Tonio Clemente, DO    therapeutic multivitamin-minerals 1 tablet, 1 tablet, Oral, Daily, Tonio Clemente, DO    ezetimibe-simvastatin (VYTORIN) 10-40 MG per tablet 1 tablet, 1 tablet, Oral, Nightly, Duyen Vu DO     Social History     Socioeconomic History    Marital status:      Spouse name: Edna Golden Number of children: 9    Years of education: 15    Highest education level: High school graduate   Occupational History    Not on file   Tobacco Use    Smoking status: Former Smoker     Packs/day: 1.50     Years: 15.00     Pack years: 22.50     Types: Cigarettes     Quit date: 1984     Years since quittin.5    Smokeless tobacco: Former User     Types: Snuff, Chew   Substance and Sexual Activity    Alcohol use: No    Drug use: No    Sexual activity: Not on file   Other Topics Concern    Not on file   Social History Narrative    Not on file     Social Determinants of Health     Financial Resource Strain: Low Risk     Difficulty of Paying Living Expenses: Not hard at all   Food Insecurity: No Food Insecurity    Worried About [address] [facility] in the Last Year: Never true    [address]  in the Last Year: Never true   Transportation Needs: No Transportation Needs    Lack of Transportation (Medical): No    Lack of Transportation (Non-Medical): No   Physical Activity: Inactive    Days of Exercise per Week: 0 days    Minutes of Exercise per Session: 0 min   Stress: Stress Concern Present    Feeling of Stress : Very much   Social Connections:  Moderately Integrated    Frequency of Communication with Friends and Family: More than three times a week    Frequency of Social Gatherings with Friends and Family: More than three times a week    Attends [employer] Services: More than 4 times per year   [employer] of [employer]s or Organizations: No    Attends Club or Organization Meetings: Never    Marital Status:    Intimate Partner Violence:     Fear of Current or Ex-Partner:     Emotionally Abused:     Physically Abused:     Sexually Abused:        Family History   Problem Relation Age of Onset    Cancer Mother         esophagus    Cancer Father         colon    Cancer Brother        ROS:   Constitutional: Positive for fatigue. Negative for chills, diaphoresis and fever. HEENT: Denies eye drainage, nasal congestion, sore throat  Respiratory: Negative for shortness of breath. Cardiac: Denies chest pain/pressure, palpitations  Gastrointestinal: Negative for abdominal pain, blood in stool, constipation, diarrhea, nausea and vomiting. Genitourinary: Positive for dysuria, frequency, hematuria and urgency.    Neuro: Denies headache, loss of balance  MS: Denies arthralgias, myalgias  Psych: Denies depression, anxiety  Skin: Denies itching, jaundice      Vitals:    05/22/21 2034 05/22/21 2203 05/23/21 0030 05/23/21 0443   BP: 137/71 128/71 132/66 (!) 147/84   Pulse: 103 102 83 109   Resp: 20 20 20 20   Temp:   98.9 °F (37.2 °C) 98.5 °F (36.9 °C)   TempSrc:   Oral Oral   SpO2: 98% 97% 97% 99%   Weight:   296 lb 4.8 oz (134.4 kg)    Height:   5' 8\" (1.727 m)          Intake/Output Summary (Last 24 hours) at 5/23/2021 0759  Last data filed at 5/23/2021 0443  Gross per 24 hour   Intake 880.2 ml   Output --   Net 880.2 ml       Labs  Recent Labs     05/22/21 2015 05/23/21  0328 05/23/21  0659   WBC 12.4* 10.8 10.4   HGB 11.1* 10.2* 10.6*   HCT 36.9* 34.3* 37.3*    352 339    139 140   K 4.1 3.9 4.3    106 105   CO2 26 25 27   BUN 28* 25* 23*   CREATININE 0.7 0.6 0.6   MG 2.0  --   --    CALCIUM 9.1 8.6 8.6     Urine microscopic: WBC: 25-50, Many bacteria, Triple Phosphate Crystals, Many bacteria  Urine culture: preliminary gram positive cocci  Blood cultures: pending   Lactic acid: 1.8      Radiology:     CT abdomen and pelvis: 5/22/21    Findings:       Study is limited secondary to motion artifact and patient's body habitus.       There is no hydronephrosis.  There are bilateral intrarenal calculi    measuring up to 8 mm on the right and 2 mm on the left.  There is a 2 cm    urinary bladder calculus. Pollie Dy is irregular urinary bladder wall    thickening including wall thickening in the 7 cm right-sided diverticulum    and 2.4 cm left-sided diverticulum. Pollie Dy is adjacent fat stranding.  The    prostate is not enlarged.       There is a heterogeneously hyperdense irregular mass in the dorsal    subcutaneous tissues of the lower right flank extending approximately 19    cm to the level of the right hip measuring approximately 10 x 12 cm in AP    by transverse width.  There is adjacent fat stranding.       There is no bowel obstruction, ascites, pneumatosis or pneumoperitoneum.     Appendix is normal.  Colonic diverticulosis without acute diverticulitis.     There is concentric with rectal wall thickening with adjacent fat    stranding.       No acute abnormalities in the remaining solid organs or abdominal aorta.     There is 5 mm hypodensity in the liver too small to accurately    characterize especially on noncontrast imaging.  Gallbladder is absent.       Bibasilar atelectasis and/or scarring.  Cardiomegaly.  No acute osseous    abnormalities.  Advanced degenerative changes in the thoracolumbar spine.           Impression   Impression:   1.  Motion affected study. 2.  Nonobstructing calculi both kidneys and in the urinary bladder.  Acute    cystitis.  7 cm and 2.4 cm urinary bladder diverticuli. 3.  Large mass in the dorsal subcutaneous tissues of the lower right flank    and hemipelvis most suggestive of a hematoma which measures approximately    10 x 12 x 19 cm.  Recommend clinical correlation. 4.  No bowel obstruction or ascites.  Colonic diverticulosis without acute    diverticulitis.  Probable proctitis. Exam  General appearance: Alert and oriented x 3. In NAD. Cooperative with examination. HEENT: NC/AT. PERRL. No eye discharge. External ears normal.   Neck: Supple. No JVD. Respiratory:  Clear bilaterally without rales, rhonchi, wheezes  Cardiovascular: Regular rate and rhythm. S1/2 normal  Abdomen: Soft. Non-distended. Mild suprapubic tenderness. Active bowel sounds. No CVA tenderness. Musculoskeletal:  UE and LE strength 4+/5. No lower extremity edema or calf tenderness. Skin: No jaundice or obvious rashes. Neurologic: CN 2-12 grossly intact. Psychiatric: Appropriate thought processes. Alert and oriented x 3. In NAD. Assessment and Plan  1. Nephrolithiasis- Per CT abdomen and pelvis. 8 mm stone on the right and 2 mm stone on the left. Non-obstructing. Patient is asymptomatic and does not want to consider treatment at this time. 2. Bladder stone- Per CT scan, there is a 2 cm bladder stone. May be contributing to his urinary retention. PVR is 543 ml. Recommend reilly catheter insertion only if patient symptomatic. This retention is likely a longstanding problem. Creatinine stable at 0.6. Will need outpatient cystoscopy for further evaluation. 3. Urinary Retention-  ml. Hold catheter unless symptomatic. 4. Bladder Diverticulum x 2- Will need outpatient cystoscopy for further evaluation. 5. Bladder Wall Thickening- Will need outpatient cystoscopy for further evaluation. 6. Pelvic/Lower Flank Hematoma- IR evaluation for drainage ordered by primary service. Would recommend conservative management only at this time, unless strongly suspect abscess. . Holding Coumadin. Hgb at 10.6. Hgb on 5/5/21 was 13.2. Continue serial H/H. Closely monitor. 7. Acute Cystitis- Gram positive cocci in clusters noted on preliminary culture. On Rocephin. 8. Persistent Afib- Coumadin on hold.      Discussed with Dr. Chloe Salamanca PADemarioC  5/23/21 [Definite:  ___ (Date)] : the last menstrual period was [unfilled] [Menarche Age: ____] : age at menarche was [unfilled] [Post-Menopause, No Sxs] : post-menopausal, currently without symptoms [___] : Living: [unfilled]

## (undated) DEVICE — SOLUTION IV IRRIG WATER 1000ML POUR BRL 2F7114

## (undated) DEVICE — PROTECTOR ULN NRV PUR FOAM HK LOOP STRP ANATOMICALLY

## (undated) DEVICE — GLOVE ORANGE PI 7 1/2   MSG9075

## (undated) DEVICE — SHEET,DRAPE,3/4,53X77,STERILE: Brand: MEDLINE

## (undated) DEVICE — CYSTO PACK: Brand: MEDLINE INDUSTRIES, INC.